# Patient Record
Sex: MALE | Race: WHITE | NOT HISPANIC OR LATINO | Employment: OTHER | URBAN - METROPOLITAN AREA
[De-identification: names, ages, dates, MRNs, and addresses within clinical notes are randomized per-mention and may not be internally consistent; named-entity substitution may affect disease eponyms.]

---

## 2018-05-01 ENCOUNTER — OFFICE VISIT (OUTPATIENT)
Dept: CARDIOLOGY | Facility: CLINIC | Age: 78
End: 2018-05-01

## 2018-05-01 VITALS
BODY MASS INDEX: 30.61 KG/M2 | HEIGHT: 72 IN | DIASTOLIC BLOOD PRESSURE: 82 MMHG | SYSTOLIC BLOOD PRESSURE: 150 MMHG | HEART RATE: 55 BPM | WEIGHT: 226 LBS

## 2018-05-01 DIAGNOSIS — Z91.89 CARDIOVASCULAR RISK FACTOR: ICD-10-CM

## 2018-05-01 DIAGNOSIS — I45.2 RBBB (RIGHT BUNDLE BRANCH BLOCK WITH LEFT ANTERIOR FASCICULAR BLOCK): Primary | ICD-10-CM

## 2018-05-01 DIAGNOSIS — I10 ESSENTIAL HYPERTENSION: ICD-10-CM

## 2018-05-01 DIAGNOSIS — E78.2 MIXED HYPERLIPIDEMIA: ICD-10-CM

## 2018-05-01 PROCEDURE — 99204 OFFICE O/P NEW MOD 45 MIN: CPT | Performed by: INTERNAL MEDICINE

## 2018-05-01 PROCEDURE — 93000 ELECTROCARDIOGRAM COMPLETE: CPT | Performed by: INTERNAL MEDICINE

## 2018-05-01 RX ORDER — AMLODIPINE BESYLATE 10 MG/1
10 TABLET ORAL DAILY
Qty: 30 TABLET | Refills: 11 | Status: SHIPPED | OUTPATIENT
Start: 2018-05-01 | End: 2019-08-04 | Stop reason: SDUPTHER

## 2018-05-01 RX ORDER — FUROSEMIDE 20 MG/1
20 TABLET ORAL DAILY
COMMUNITY
End: 2021-06-25

## 2018-05-01 RX ORDER — LOSARTAN POTASSIUM 100 MG/1
100 TABLET ORAL DAILY
COMMUNITY
End: 2021-06-25

## 2018-05-01 RX ORDER — AMLODIPINE BESYLATE 5 MG/1
5 TABLET ORAL
COMMUNITY
Start: 2017-12-06 | End: 2018-05-01 | Stop reason: SDUPTHER

## 2018-05-01 RX ORDER — CHLORAL HYDRATE 500 MG
1000 CAPSULE ORAL
COMMUNITY

## 2018-05-01 NOTE — PROGRESS NOTES
Date of Office Visit: 18  Encounter Provider: Richie Aragon MD  Place of Service: [facility] CARDIOLOGY  Patient Name: Herber Lewis  :1940  Referral Provider:No ref. provider found      Chief Complaint   Patient presents with   • Abnormal ECG   • Hypertension     History of Present Illness  Mr Lewis is a pleasant 78 yo gentleman with history of hypertension, hyperlipidemia, stage III kidney disease and tuberculosis treated with INH in the distant past.  But he now comes in for cardiovascular evaluation and for elevated blood pressure.  He states that his blood pressures been running 150 systolic at home for quite some time his future wife is concerned about that.  But he denies any chest pain or pressure.  Denies any shortness of breath, orthopnea, or PND.  Denies any palpitations, near-syncope or syncope.  Denies near abrupt loss of vision, paralysis, paresthesia, or dysarthria.  He does not exercise regularly he doesn't really seem to follow diet he still smokes cigarettes.          Past Medical History:   Diagnosis Date   • BPH (benign prostatic hyperplasia)    • Hyperlipidemia    • Hypertension    • Nicotine dependence    • TB (pulmonary tuberculosis)          History reviewed. No pertinent surgical history.      No current outpatient prescriptions on file prior to visit.     No current facility-administered medications on file prior to visit.          Social History     Social History   • Marital status: Single     Spouse name: N/A   • Number of children: N/A   • Years of education: N/A     Occupational History   • Not on file.     Social History Main Topics   • Smoking status: Current Every Day Smoker   • Smokeless tobacco: Not on file   • Alcohol use Yes   • Drug use: Unknown   • Sexual activity: Not on file     Other Topics Concern   • Not on file     Social History Narrative   • No narrative on file       History reviewed. No pertinent family  "history.      Review of Systems   Constitution: Negative for decreased appetite, diaphoresis, fever, weakness, malaise/fatigue, weight gain and weight loss.   HENT: Negative for congestion, hearing loss, nosebleeds and tinnitus.    Eyes: Negative for blurred vision, double vision, vision loss in left eye, vision loss in right eye and visual disturbance.   Cardiovascular:        As noted in HPI   Respiratory:        As noted HPI   Endocrine: Negative for cold intolerance and heat intolerance.   Hematologic/Lymphatic: Negative for bleeding problem. Does not bruise/bleed easily.   Skin: Negative for color change, flushing, itching and rash.   Musculoskeletal: Positive for joint pain. Negative for arthritis, back pain, joint swelling, muscle weakness and myalgias.   Gastrointestinal: Negative for bloating, abdominal pain, constipation, diarrhea, dysphagia, heartburn, hematemesis, hematochezia, melena, nausea and vomiting.   Genitourinary: Negative for bladder incontinence, dysuria, frequency, nocturia and urgency.   Neurological: Negative for dizziness, focal weakness, headaches, light-headedness, loss of balance, numbness, paresthesias and vertigo.   Psychiatric/Behavioral: Negative for depression, memory loss and substance abuse.       Procedures      ECG 12 Lead  Date/Time: 5/1/2018 9:22 AM  Performed by: ONIEL HOOKS  Authorized by: ONIEL HOOKS   Comparison: not compared with previous ECG   Previous ECG: no previous ECG available  Rhythm: sinus rhythm  Rate: normal  Conduction: right bundle branch block and LAFB  QRS axis: left                  Objective:    /82 (BP Location: Left arm)   Pulse 55   Ht 182.9 cm (72\")   Wt 103 kg (226 lb)   BMI 30.65 kg/m²        Physical Exam  Physical Exam   Constitutional: He is oriented to person, place, and time. He appears well-developed and well-nourished. No distress.   HENT:   Head: Normocephalic.   Eyes: Conjunctivae are normal. Pupils are equal, round, " and reactive to light. No scleral icterus.   Neck: Normal carotid pulses, no hepatojugular reflux and no JVD present. Carotid bruit is not present. No tracheal deviation, no edema and no erythema present. No thyromegaly present.   Cardiovascular: Normal rate, regular rhythm, S1 normal, S2 normal, normal heart sounds and intact distal pulses.   No extrasystoles are present. PMI is not displaced.  Exam reveals no gallop, no distant heart sounds and no friction rub.    No murmur heard.  Pulses:       Carotid pulses are 2+ on the right side, and 2+ on the left side.       Radial pulses are 2+ on the right side, and 2+ on the left side.        Femoral pulses are 2+ on the right side, and 2+ on the left side.       Dorsalis pedis pulses are 2+ on the right side, and 2+ on the left side.        Posterior tibial pulses are 2+ on the right side, and 2+ on the left side.   Pulmonary/Chest: Effort normal and breath sounds normal. No respiratory distress. He has no decreased breath sounds. He has no wheezes. He has no rhonchi. He has no rales. He exhibits no tenderness.   Abdominal: Soft. Bowel sounds are normal. He exhibits no distension and no mass. There is no hepatosplenomegaly. There is no tenderness. There is no rebound and no guarding.   Musculoskeletal: He exhibits no edema, tenderness or deformity.   Neurological: He is alert and oriented to person, place, and time.   Skin: Skin is warm and dry. No rash noted. He is not diaphoretic. No cyanosis or erythema. No pallor. Nails show no clubbing.   Psychiatric: He has a normal mood and affect. His speech is normal and behavior is normal. Judgment and thought content normal.           Assessment:   1.  This is a 77-year-old gentleman who now presents for risk assessment.  Unfortunately he's not had a lipid profile in 5 or 6 years he's to return for a fasting lipid profile but I'm sure he is at very high risk and would benefit from statin therapy.  He doesn't exercise  regularly we told him the goal should be 40 minutes moderate activity 4 days a week, he should follow a DASH diet .  And he needs to discontinue cigarette smoking.  2.  Hypertension blood pressure elevated were increasing the amlodipine to 10 mg a day however do need to be sensitive to his renal insufficiency make sure that doesn't worsen.  He's to call us back in one to 2 weeks let us know what his blood pressures are running take his blood pressure twice a day.  3.  Right bundle branch block and left interfascicular block appears be asymptomatic but will check an echocardiogram to rule out any structural heart disease.  4.  History of stage III kidney disease follows with nephrology.  5.  Hyperlipidemia as outlined above.  6.  History of tuberculosis treated with INH in the distant past.         Plan:

## 2018-05-23 ENCOUNTER — TELEPHONE (OUTPATIENT)
Dept: CARDIOLOGY | Facility: CLINIC | Age: 78
End: 2018-05-23

## 2018-05-23 ENCOUNTER — HOSPITAL ENCOUNTER (OUTPATIENT)
Dept: CARDIOLOGY | Facility: HOSPITAL | Age: 78
Discharge: HOME | End: 2018-05-23
Attending: INTERNAL MEDICINE | Admitting: INTERNAL MEDICINE

## 2018-05-23 VITALS
DIASTOLIC BLOOD PRESSURE: 70 MMHG | HEART RATE: 57 BPM | SYSTOLIC BLOOD PRESSURE: 160 MMHG | BODY MASS INDEX: 30.61 KG/M2 | WEIGHT: 226 LBS | HEIGHT: 72 IN

## 2018-05-23 LAB
ASCENDING AORTA: 2.9 CM
BH CV ECHO MEAS - ACS: 2.3 CM
BH CV ECHO MEAS - AO MAX PG (FULL): -0.16 MMHG
BH CV ECHO MEAS - AO MAX PG: 7 MMHG
BH CV ECHO MEAS - AO MEAN PG (FULL): 0.97 MMHG
BH CV ECHO MEAS - AO MEAN PG: 4.3 MMHG
BH CV ECHO MEAS - AO ROOT AREA (BSA CORRECTED): 1.7
BH CV ECHO MEAS - AO ROOT AREA: 11 CM^2
BH CV ECHO MEAS - AO ROOT DIAM: 3.7 CM
BH CV ECHO MEAS - AO V2 MAX: 131.9 CM/SEC
BH CV ECHO MEAS - AO V2 MEAN: 99 CM/SEC
BH CV ECHO MEAS - AO V2 VTI: 33.4 CM
BH CV ECHO MEAS - AVA(I,A): 2.4 CM^2
BH CV ECHO MEAS - AVA(I,D): 2.4 CM^2
BH CV ECHO MEAS - AVA(V,A): 3.1 CM^2
BH CV ECHO MEAS - AVA(V,D): 3.1 CM^2
BH CV ECHO MEAS - BSA(HAYCOCK): 2.3 M^2
BH CV ECHO MEAS - BSA: 2.2 M^2
BH CV ECHO MEAS - BZI_BMI: 30.7 KILOGRAMS/M^2
BH CV ECHO MEAS - BZI_METRIC_HEIGHT: 182.9 CM
BH CV ECHO MEAS - BZI_METRIC_WEIGHT: 102.5 KG
BH CV ECHO MEAS - CONTRAST EF (2CH): 63.5 ML/M^2
BH CV ECHO MEAS - CONTRAST EF 4CH: 56.2 ML/M^2
BH CV ECHO MEAS - EDV(MOD-SP2): 96 ML
BH CV ECHO MEAS - EDV(MOD-SP4): 89 ML
BH CV ECHO MEAS - EDV(TEICH): 129.9 ML
BH CV ECHO MEAS - EF(CUBED): 82.9 %
BH CV ECHO MEAS - EF(MOD-BP): 60 %
BH CV ECHO MEAS - EF(MOD-SP2): 63.5 %
BH CV ECHO MEAS - EF(MOD-SP4): 56.2 %
BH CV ECHO MEAS - EF(TEICH): 75.5 %
BH CV ECHO MEAS - ESV(MOD-SP2): 35 ML
BH CV ECHO MEAS - ESV(MOD-SP4): 39 ML
BH CV ECHO MEAS - ESV(TEICH): 31.9 ML
BH CV ECHO MEAS - FS: 44.5 %
BH CV ECHO MEAS - IVS/LVPW: 0.96
BH CV ECHO MEAS - IVSD: 1.1 CM
BH CV ECHO MEAS - LAT PEAK E' VEL: 12 CM/SEC
BH CV ECHO MEAS - LV DIASTOLIC VOL/BSA (35-75): 39.7 ML/M^2
BH CV ECHO MEAS - LV MASS(C)D: 215.3 GRAMS
BH CV ECHO MEAS - LV MASS(C)DI: 96 GRAMS/M^2
BH CV ECHO MEAS - LV MAX PG: 7.1 MMHG
BH CV ECHO MEAS - LV MEAN PG: 3.3 MMHG
BH CV ECHO MEAS - LV SYSTOLIC VOL/BSA (12-30): 17.4 ML/M^2
BH CV ECHO MEAS - LV V1 MAX: 133.4 CM/SEC
BH CV ECHO MEAS - LV V1 MEAN: 83.7 CM/SEC
BH CV ECHO MEAS - LV V1 VTI: 25.6 CM
BH CV ECHO MEAS - LVIDD: 5.2 CM
BH CV ECHO MEAS - LVIDS: 2.9 CM
BH CV ECHO MEAS - LVLD AP2: 8.3 CM
BH CV ECHO MEAS - LVLD AP4: 8 CM
BH CV ECHO MEAS - LVLS AP2: 6.6 CM
BH CV ECHO MEAS - LVLS AP4: 6.6 CM
BH CV ECHO MEAS - LVOT AREA (M): 3.1 CM^2
BH CV ECHO MEAS - LVOT AREA: 3.1 CM^2
BH CV ECHO MEAS - LVOT DIAM: 2 CM
BH CV ECHO MEAS - LVPWD: 1.1 CM
BH CV ECHO MEAS - MED PEAK E' VEL: 9 CM/SEC
BH CV ECHO MEAS - MR MAX PG: 14.8 MMHG
BH CV ECHO MEAS - MR MAX VEL: 192.4 CM/SEC
BH CV ECHO MEAS - MV A DUR: 0.1 SEC
BH CV ECHO MEAS - MV A MAX VEL: 93.1 CM/SEC
BH CV ECHO MEAS - MV DEC SLOPE: 439.9 CM/SEC^2
BH CV ECHO MEAS - MV DEC TIME: 0.2 SEC
BH CV ECHO MEAS - MV E MAX VEL: 97 CM/SEC
BH CV ECHO MEAS - MV E/A: 1
BH CV ECHO MEAS - MV MAX PG: 4.9 MMHG
BH CV ECHO MEAS - MV MEAN PG: 1.5 MMHG
BH CV ECHO MEAS - MV P1/2T MAX VEL: 96.5 CM/SEC
BH CV ECHO MEAS - MV P1/2T: 64.3 MSEC
BH CV ECHO MEAS - MV V2 MAX: 110.1 CM/SEC
BH CV ECHO MEAS - MV V2 MEAN: 53.5 CM/SEC
BH CV ECHO MEAS - MV V2 VTI: 30.7 CM
BH CV ECHO MEAS - MVA P1/2T LCG: 2.3 CM^2
BH CV ECHO MEAS - MVA(P1/2T): 3.4 CM^2
BH CV ECHO MEAS - MVA(VTI): 2.6 CM^2
BH CV ECHO MEAS - PA ACC TIME: 0.15 SEC
BH CV ECHO MEAS - PA MAX PG (FULL): 2.1 MMHG
BH CV ECHO MEAS - PA MAX PG: 3.9 MMHG
BH CV ECHO MEAS - PA PR(ACCEL): 9.3 MMHG
BH CV ECHO MEAS - PA V2 MAX: 98.5 CM/SEC
BH CV ECHO MEAS - PULM A REVS DUR: 0.09 SEC
BH CV ECHO MEAS - PULM A REVS VEL: 27.6 CM/SEC
BH CV ECHO MEAS - PULM DIAS VEL: 87.6 CM/SEC
BH CV ECHO MEAS - PULM S/D: 0.83
BH CV ECHO MEAS - PULM SYS VEL: 72.8 CM/SEC
BH CV ECHO MEAS - PVA(V,A): 2.8 CM^2
BH CV ECHO MEAS - PVA(V,D): 2.8 CM^2
BH CV ECHO MEAS - QP/QS: 0.77
BH CV ECHO MEAS - RAP SYSTOLE: 15 MMHG
BH CV ECHO MEAS - RV MAX PG: 1.8 MMHG
BH CV ECHO MEAS - RV MEAN PG: 0.98 MMHG
BH CV ECHO MEAS - RV V1 MAX: 67.4 CM/SEC
BH CV ECHO MEAS - RV V1 MEAN: 45.5 CM/SEC
BH CV ECHO MEAS - RV V1 VTI: 14.9 CM
BH CV ECHO MEAS - RVOT AREA: 4.1 CM^2
BH CV ECHO MEAS - RVOT DIAM: 2.3 CM
BH CV ECHO MEAS - RVSP: 28 MMHG
BH CV ECHO MEAS - SI(AO): 163.5 ML/M^2
BH CV ECHO MEAS - SI(CUBED): 52.2 ML/M^2
BH CV ECHO MEAS - SI(LVOT): 35.5 ML/M^2
BH CV ECHO MEAS - SI(MOD-SP2): 27.2 ML/M^2
BH CV ECHO MEAS - SI(MOD-SP4): 22.3 ML/M^2
BH CV ECHO MEAS - SI(TEICH): 43.7 ML/M^2
BH CV ECHO MEAS - SUP REN AO DIAM: 1.8 CM
BH CV ECHO MEAS - SV(AO): 366.8 ML
BH CV ECHO MEAS - SV(CUBED): 117.1 ML
BH CV ECHO MEAS - SV(LVOT): 79.6 ML
BH CV ECHO MEAS - SV(MOD-SP2): 61 ML
BH CV ECHO MEAS - SV(MOD-SP4): 50 ML
BH CV ECHO MEAS - SV(RVOT): 61.4 ML
BH CV ECHO MEAS - SV(TEICH): 98 ML
BH CV ECHO MEAS - TAPSE (>1.6): 2.4 CM2
BH CV ECHO MEAS - TR MAX VEL: 183.1 CM/SEC
BH CV ECHO MEASUREMENTS AVERAGE E/E' RATIO: 9.24
BH CV XLRA - RV BASE: 3.5 CM
BH CV XLRA - TDI S': 15 CM/SEC
LEFT ATRIUM VOLUME INDEX: 27 ML/M2
LV EF 2D ECHO EST: 60 %
SINUS: 3.7 CM
STJ: 2.6 CM

## 2018-05-23 PROCEDURE — 93306 TTE W/DOPPLER COMPLETE: CPT

## 2018-05-23 PROCEDURE — 93306 TTE W/DOPPLER COMPLETE: CPT | Performed by: INTERNAL MEDICINE

## 2018-05-23 NOTE — TELEPHONE ENCOUNTER
Herber Lewis  Male, 77 y.o., 1940  PCP:   Cleveland Dwyer MD  Language:   English  Need Interp:   No  Last Weight:   103 kg (226 lb)  Phone:   M: [phone] H: [phone]  Allergies  Codeine  Health Maintenance:   Due  FYI:   None  Primary Ins.:   [employer] MEDICARE REPLACEMENT  MRN:   [id number]  MyChart:   Inactive  Pharmacy:   [facility] [address] [place] [address] [address] AT [facility] & [address] - [phone]  - [phone] FX [[id number]]  Preferred Lab:   None  Next Appt with Me:   None  Next Appt Date by Dept:   None        PACS Images     Radiology Images   Adult Transthoracic Echo Complete W/ Cont if Necessary Per Protocol   Order: [id number]   Status:  Final result   Visible to patient:  No (Not Released) Dx:  RBBB (right bundle branch block with ...   Details     Reading Physician Reading Date Result Priority   Radha Hill MD 5/23/2018 Routine   Result Text   ·   Left ventricular systolic function is normal. Calculated EF = 60%. Estimated EF was in agreement with the calculated EF. Estimated EF = 60%. Normal left ventricular cavity size noted. All left ventricular wall segments contract normally. Left ventricular wall thickness is consistent with mild concentric hypertrophy. Left ventricular diastolic function is normal.  · The aortic valve is abnormal in structure. There is moderate thickening of the aortic valve. There is moderate thickening of the noncoronary cusp. No aortic valve regurgitation is present. No aortic valve stenosis is present.  · Mild MAC is present. Trace mitral valve regurgitation is present.  · The tricuspid valve is grossly normal. Trace tricuspid valve regurgitation is present. Estimated right ventricular systolic pressure from tricuspid regurgitation is normal (<35 mmHg). Calculated right ventricular systolic pressure from tricuspid regurgitation is 28 mmHg.            All Measurements                                                                                                                                                                                                                                                                                                                                                                                                                                                                                                                                                                                                                                                    [facility]  [address]  [address]  [facility] [address] [phone]      Study Description     A two-dimensional transthoracic echocardiogram with color flow and Doppler was performed. The study is technically adequate for diagnosis.   Echocardiogram Findings     Left Ventricle Left ventricular systolic function is normal. Calculated EF = 60%. Estimated EF was in agreement with the calculated EF. Estimated EF = 60%. Normal left ventricular cavity size noted. All left ventricular wall segments contract normally. Left ventricular wall thickness is consistent with mild concentric hypertrophy. Left ventricular diastolic function is normal.      Right Ventricle Normal right ventricular cavity size, wall thickness, systolic function and septal motion noted.      Left Atrium Normal left atrial volume noted.      Right Atrium Normal right atrial size noted. The inferior vena cava is dilated. Partial IVC inspiratory collapse of less than 50% noted.      Aortic Valve The valve was poorly visualized but appears trileaflet. The aortic valve is abnormal in structure. There is moderate thickening of the aortic valve. There is moderate thickening of the noncoronary cusp. No aortic valve regurgitation is present. No aortic valve stenosis is present.      Mitral Valve The mitral valve is abnormal in structure. Mild MAC is  present. Trace mitral valve regurgitation is present. No significant mitral valve stenosis is present.      Tricuspid Valve Tricuspid valve not well visualized. The tricuspid valve is grossly normal. Trace tricuspid valve regurgitation is present. Estimated right ventricular systolic pressure from tricuspid regurgitation is normal (<35 mmHg). Calculated right ventricular systolic pressure from tricuspid regurgitation is 28 mmHg.      Pulmonic Valve The pulmonic valve was not well visualized. The pulmonic valve is grossly normal in structure. There is no pulmonic valve regurgitation present.      Greater Vessels No dilation of the aortic root is present. No dilation of the proximal aorta is present. The aortic arch was not well visualized. The inferior vena cava is dilated. Partial IVC inspiratory collapse of less than 50% noted.      Pericardium The pericardium is normal. There is no evidence of pericardial effusion.      Wall Scoring     Score Index: 1.000 Percent Normal: 100.0%             The left ventricular wall motion is normal.               PACS Images     Show images for Adult Transthoracic Echo Complete W/ Cont if Necessary Per Protocol         Specimen Collected: 05/23/18 10:08 Last Resulted: 05/23/18 11:40                Status of Other Orders     Order  Lab Status Result Date Provider Status   ECG 12 Lead Final result 5/1/2018 Open   SCANNED EKG Final result 5/1/2018 Open          Encounter Notes      All notes         Routing History     Priority Sent On From To Message Type    5/23/2018 11:45 AM MD Richie Martell MD Results

## 2019-06-04 ENCOUNTER — LAB (OUTPATIENT)
Dept: LAB | Facility: HOSPITAL | Age: 79
End: 2019-06-04

## 2019-06-04 ENCOUNTER — CONSULT (OUTPATIENT)
Dept: ONCOLOGY | Facility: CLINIC | Age: 79
End: 2019-06-04

## 2019-06-04 VITALS
HEIGHT: 71 IN | WEIGHT: 228.7 LBS | HEART RATE: 50 BPM | DIASTOLIC BLOOD PRESSURE: 78 MMHG | OXYGEN SATURATION: 97 % | TEMPERATURE: 97.5 F | SYSTOLIC BLOOD PRESSURE: 168 MMHG | BODY MASS INDEX: 32.02 KG/M2 | RESPIRATION RATE: 16 BRPM

## 2019-06-04 DIAGNOSIS — R97.20 ELEVATED PROSTATE SPECIFIC ANTIGEN (PSA): ICD-10-CM

## 2019-06-04 DIAGNOSIS — D72.820 LYMPHOCYTOSIS: Primary | ICD-10-CM

## 2019-06-04 DIAGNOSIS — C91.10 CLL (CHRONIC LYMPHOCYTIC LEUKEMIA) (HCC): Primary | ICD-10-CM

## 2019-06-04 DIAGNOSIS — C91.10 CLL (CHRONIC LYMPHOCYTIC LEUKEMIA) (HCC): ICD-10-CM

## 2019-06-04 PROBLEM — E21.3 HYPERPARATHYROIDISM (HCC): Status: ACTIVE | Noted: 2017-01-01

## 2019-06-04 LAB
ALBUMIN SERPL-MCNC: 4.9 G/DL (ref 3.5–5.2)
ALBUMIN/GLOB SERPL: 1.9 G/DL (ref 1.1–2.4)
ALP SERPL-CCNC: 130 U/L (ref 38–116)
ALT SERPL W P-5'-P-CCNC: 17 U/L (ref 0–41)
ANION GAP SERPL CALCULATED.3IONS-SCNC: 12.2 MMOL/L
AST SERPL-CCNC: 19 U/L (ref 0–40)
B2 MICROGLOB SERPL-MCNC: 5 MG/L (ref 0.8–2.2)
BILIRUB SERPL-MCNC: 0.3 MG/DL (ref 0.2–1.2)
BUN BLD-MCNC: 24 MG/DL (ref 6–20)
BUN/CREAT SERPL: 14 (ref 7.3–30)
CALCIUM SPEC-SCNC: 9.9 MG/DL (ref 8.5–10.2)
CHLORIDE SERPL-SCNC: 102 MMOL/L (ref 98–107)
CO2 SERPL-SCNC: 26.8 MMOL/L (ref 22–29)
CREAT BLD-MCNC: 1.71 MG/DL (ref 0.7–1.3)
DEPRECATED RDW RBC AUTO: 44.5 FL (ref 37–54)
ERYTHROCYTE [DISTWIDTH] IN BLOOD BY AUTOMATED COUNT: 13.3 % (ref 12.3–15.4)
GFR SERPL CREATININE-BSD FRML MDRD: 39 ML/MIN/1.73
GLOBULIN UR ELPH-MCNC: 2.6 GM/DL (ref 1.8–3.5)
GLUCOSE BLD-MCNC: 116 MG/DL (ref 74–124)
HBV CORE IGM SERPL QL IA: NORMAL
HCT VFR BLD AUTO: 40 % (ref 37.5–51)
HGB BLD-MCNC: 12.9 G/DL (ref 13–17.7)
LDH SERPL-CCNC: 200 U/L (ref 99–259)
LYMPHOCYTES # BLD MANUAL: 173.71 10*3/MM3 (ref 0.7–3.1)
LYMPHOCYTES NFR BLD MANUAL: 95 % (ref 19.6–45.3)
MCH RBC QN AUTO: 29.9 PG (ref 26.6–33)
MCHC RBC AUTO-ENTMCNC: 32.3 G/DL (ref 31.5–35.7)
MCV RBC AUTO: 92.8 FL (ref 79–97)
NEUTROPHILS # BLD AUTO: 9.14 10*3/MM3 (ref 1.7–7)
NEUTROPHILS NFR BLD MANUAL: 5 % (ref 42.7–76)
PLAT MORPH BLD: NORMAL
PLATELET # BLD AUTO: 164 10*3/MM3 (ref 140–450)
PMV BLD AUTO: 10 FL (ref 6–12)
POTASSIUM BLD-SCNC: 5.1 MMOL/L (ref 3.5–4.7)
PROT SERPL-MCNC: 7.5 G/DL (ref 6.3–8)
PSA SERPL-MCNC: 2.19 NG/ML (ref 0–4)
RBC # BLD AUTO: 4.31 10*6/MM3 (ref 4.14–5.8)
RBC MORPH BLD: NORMAL
SODIUM BLD-SCNC: 141 MMOL/L (ref 134–145)
URATE SERPL-MCNC: 8.2 MG/DL (ref 2.8–7.4)
WBC MORPH BLD: NORMAL
WBC NRBC COR # BLD: 182.85 10*3/MM3 (ref 3.4–10.8)

## 2019-06-04 PROCEDURE — 99205 OFFICE O/P NEW HI 60 MIN: CPT | Performed by: INTERNAL MEDICINE

## 2019-06-04 PROCEDURE — 86705 HEP B CORE ANTIBODY IGM: CPT | Performed by: INTERNAL MEDICINE

## 2019-06-04 PROCEDURE — 83615 LACTATE (LD) (LDH) ENZYME: CPT | Performed by: INTERNAL MEDICINE

## 2019-06-04 PROCEDURE — 85025 COMPLETE CBC W/AUTO DIFF WBC: CPT | Performed by: INTERNAL MEDICINE

## 2019-06-04 PROCEDURE — 36416 COLLJ CAPILLARY BLOOD SPEC: CPT | Performed by: INTERNAL MEDICINE

## 2019-06-04 PROCEDURE — 84550 ASSAY OF BLOOD/URIC ACID: CPT | Performed by: INTERNAL MEDICINE

## 2019-06-04 PROCEDURE — 36415 COLL VENOUS BLD VENIPUNCTURE: CPT | Performed by: INTERNAL MEDICINE

## 2019-06-04 PROCEDURE — 85007 BL SMEAR W/DIFF WBC COUNT: CPT | Performed by: INTERNAL MEDICINE

## 2019-06-04 PROCEDURE — 82232 ASSAY OF BETA-2 PROTEIN: CPT | Performed by: INTERNAL MEDICINE

## 2019-06-04 PROCEDURE — 84153 ASSAY OF PSA TOTAL: CPT | Performed by: INTERNAL MEDICINE

## 2019-06-04 PROCEDURE — 80053 COMPREHEN METABOLIC PANEL: CPT | Performed by: INTERNAL MEDICINE

## 2019-06-04 NOTE — PROGRESS NOTES
Subjective     REASON FOR CONSULTATION:  LEUKOCYTOSIS, LYMPHOCYTOSIS, LIKELY CLL  Provide an opinion on any further workup or treatment                             REQUESTING PHYSICIAN:  DR MIGUEL VARGHESE MD, DR FIDE GUTIERRES MD    RECORDS OBTAINED:  Records of the patients history including those obtained from the referring provider were reviewed and summarized in detail.          History of Present Illness A 77-year-old white male who has been extremely healthy most of his life who usually does not see any physician for anything in particular who has been sent to me and discussed with Fide Gutierres MD a few days ago in the background of leukocytosis. When he was seen by Fide Gutierres MD last week his white count was 152,000 and the differential from this mostly was lymphocytes. At that point I thought that the patient could have chronic lymphocytic leukemia. He is here today for assessment of this. Physically the patient has no fever, no chills, no nocturnal sweats, he has no fatigue. He has no obvious adenopathies on any level besides minimal alterations in his neck on the left side. His appetite and weight remain stable, his bowel function and urination are normal. He has frequency to urinate. He has no hematuria. The patient denies any pain on any level, no skeletal pain, he denies any infections and he has no obvious autoimmunity of any nature. He has no rashes in the skin. He is strong like an ox and he is capable of doing anything that he pleases with no limitations. He plays golf, he does sailing and he does a lot of other housework. The patient has no other health issues at this time.         Past Medical History:   Diagnosis Date   • Abnormal ECG    • BPH (benign prostatic hyperplasia)    • Chronic kidney disease    • Hyperlipidemia    • Hyperparathyroidism (CMS/HCC) 2017    parathyroidectomy   • Hypertension    • Nicotine dependence    • TB (pulmonary tuberculosis)         Past Surgical  History:   Procedure Laterality Date   • PARATHYROID GLAND SURGERY     • TRIGGER FINGER RELEASE      Left 5th finger        Current Outpatient Medications on File Prior to Visit   Medication Sig Dispense Refill   • amLODIPine (NORVASC) 10 MG tablet Take 1 tablet by mouth Daily. 30 tablet 11   • Ascorbic Acid (VITAMIN C PO) Take  by mouth.     • aspirin 81 MG tablet Take 81 mg by mouth.     • furosemide (LASIX) 20 MG tablet Take 20 mg by mouth Daily.     • HYDRALAZINE HCL PO Take 25 mg by mouth Daily.     • losartan (COZAAR) 100 MG tablet Take 100 mg by mouth Daily.     • METOPROLOL TARTRATE PO Take 25 mg by mouth Daily.     • Multiple Vitamins-Minerals (MULTIVITAL-M) tablet Take  by mouth Daily.     • Omega-3 Fatty Acids (FISH OIL) 1000 MG capsule capsule Take 1,000 mg by mouth Daily With Breakfast.     • VITAMIN E PO Take  by mouth.       No current facility-administered medications on file prior to visit.         ALLERGIES:    Allergies   Allergen Reactions   • Codeine Unknown (See Comments)     UNKNOWN          Social History     Socioeconomic History   • Marital status:      Spouse name: Not on file   • Number of children: 2   • Years of education: Not on file   • Highest education level: High school graduate   Occupational History     Employer: RETIRED   Social Needs   • Financial resource strain: Not hard at all   • Food insecurity:     Worry: Never true     Inability: Never true   • Transportation needs:     Medical: No     Non-medical: Not on file   Tobacco Use   • Smoking status: Current Every Day Smoker     Packs/day: 0.50     Years: 60.00     Pack years: 30.00   • Smokeless tobacco: Never Used   Substance and Sexual Activity   • Alcohol use: Yes     Alcohol/week: 1.2 oz     Types: 2 Cans of beer per week     Frequency: 2-4 times a month     Drinks per session: 1 or 2     Binge frequency: Never     Comment: 2-3 beers 1-2 days a week   • Drug use: No     Comment: 2 cups coffee daily   • Sexual  activity: Defer     Partners: Female        Family History   Problem Relation Age of Onset   • Heart disease Mother    • Stroke Mother    • Diabetes Mother    • Hypertension Sister    • Cancer Father         Review of Systems   General: no fever, no chills, no fatigue,no weight changes, no lack of appetite.  Eyes: no epiphora, xerophthalmia,conjunctivitis, pain, glaucoma, blurred vision, blindness, secretion, photophobia, proptosis, diplopia.  Ears: no otorrhea, tinnitus, otorrhagia, deafness, pain, vertigo.  Nose: no rhinorrhea, no epistaxis, no alteration in perception of odors, no sinuses pressure.  Mouth: no alteration in gums or teeth,  No ulcers, no difficulty with mastication or deglut ion, no odynophagia.  Neck: no masses or pain, no thyroid alterations, no pain in muscles or arteries, no carotid odynia, no crepitation.  Respiratory: no cough, no sputum production,no dyspnea,no trepopnea, no pleuritic pain,no hemoptysis.  Heart: no syncope, no irregularity, no palpitations, no angina,no orthopnea,no paroxysmal nocturnal dyspnea.  Vascular Venous: no tenderness,no edema,no palpable cords,no postphlebitic syndrome, no skin changes no ulcerations.  Vascular Arterial: no distal ischemia, noclaudication, no gangrene, no neuropathic ischemic pain, no skin ulcers, no paleness no cyanosis.  GI: no dysphagia, no odynophagia, no regurgitation, no heartburn,no indigestion,no nausea,no vomiting,no hematemesis ,no melena,no jaundice,no distention, no obstipation,no enterorrhagia,no proctalgia,no anal  lesions, no changes in bowel habits.  :  frequency, no hesitancy, no hematuria, no discharge,no  pain.  Musculoskeletal: no muscle or tendon pain or inflammation,no  joint pain, no edema, no functional limitation,no fasciculations, no mass.  Neurologic: no headache, no seizures, noalterations on Craneal nerves, no motor deficit, no sensory deficit, normal coordination, no alteration in memory,normal orientation,  "calculation,normal writting, verbal and written language.  Skin: no rashes,no pruritus no localized lesions.  Psychiatric: no anxiety, no depression,no agitation, no delusions, proper insight.      Objective     Vitals:    06/04/19 0807   BP: 168/78   Pulse: 50   Resp: 16   Temp: 97.5 °F (36.4 °C)   TempSrc: Oral   SpO2: 97%   Weight: 104 kg (228 lb 11.2 oz)   Height: 179.5 cm (70.67\")  Comment: New Ht No Shoes   PainSc: 0-No pain     Current Status 6/4/2019   ECOG score 0       Physical Exam    GENERAL:  Well-developed, well-nourished  Patient  in no acute distress.   SKIN:  Warm, dry ,NO rashes,NO purpura ,NO petechiae.  HEENT:  Pupils were equal and reactive to light and accomodation, conjunctivas non injected, no pterigion, normal extraocular movements, normal visual acuity.   Mouth mucosa was moist, no exudates in oropharynx, normal gum line, normal roof of the mouth and pillars, normal papillations of the tongue.  NECK:  Supple with good range of motion; no thyromegaly or masses, no JVD or bruits, no cervical adenopathies.No carotid arteries pain, no carotid abnormal pulsation , NO arterial dance.  LYMPHATICS:  Minimal 1 cm left cervical nodes soft mobile none tender cervical, NO supraclavicular, NO axillary,NO epitrochlear , NO inguinal adenopathy.  CHEST:  Normal excursion of both ephraim thoraces, normal voice fremitus, no subcutaneous emphysema, normal axillas, no rashes or acanthosis nigricans. Lungs clear to percussion and auscultation, normal breath sounds bilaterally, no wheezing,NO crackles NO ronchi, NO stridor, NO rubs.  CARDIAC AND VASCULAR:  normal rate and regular rhythm, without murmurs,NO rubs NO S3 NO S4 right or left . Normal femoral, popliteal, pedis, brachial and carotid pulses.  ABDOMEN:  Soft, nontender with no organomegaly or masses, no ascites, no collateral circulation,no distention,no [place] sign, no abdominal pain, no inguinal hernias,no umbilical hernia, no abdominal bruits. Normal " bowel sounds.  GENITAL: Not  Performed.  EXTREMITIES  AND SPINE:  No clubbing, cyanosis or edema, no deformities or pain .No kyphosis, scoliosis, deformities or pain in spine, ribs or pelvic bone.  NEUROLOGICAL:  Patient was awake, alert, oriented to time, person and place.        RECENT LABS:  Hematology WBC   Date Value Ref Range Status   06/04/2019 182.85 (C) 3.40 - 10.80 10*3/mm3 Final     RBC   Date Value Ref Range Status   06/04/2019 4.31 4.14 - 5.80 10*6/mm3 Final     Hemoglobin   Date Value Ref Range Status   06/04/2019 12.9 (L) 13.0 - 17.7 g/dL Final     Hematocrit   Date Value Ref Range Status   06/04/2019 40.0 37.5 - 51.0 % Final     Platelets   Date Value Ref Range Status   06/04/2019 164 140 - 450 10*3/mm3 Final          Assessment/Plan  In summary I have an extremely healthy 78-year-old white male who looks like 57 who is extremely active with no limitations in lifestyle, sailing. Actually he has been sailing from [place] to [facility] and back to [facility] to [place] and so forth who presented to Fide Parekh MD, Nephrologist last week with a white count of 152,000. Today his white count is 180,000. I have reviewed his peripheral blood under the microscope and most of his white cells are mature lymphocytes with typical smudge features. The white cell morphology shows no evidence of prolymphocytes and there are no blasts or plasma cells in circulation. The red cell morphology is normal, the platelet count and morphology is normal.     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 The fact that the white count has increased by 30,000 in question of a week allows me to know that this patient is going to require therapy. He has a trip scheduled to go to [place] in the near part of June and we need to hartley to make a diagnosis and to decide what therapy he is going to require. Tentatively my opinion will be that the patient will be a candidate to receive Imbruvica or Leukeran at  least through the trip to [place] and once that he returns back depending on the blood counts the patient will be amenable to have a different intervention. We further need to subclassify his CLL depending on the time and characteristics. He needs to have staging of the disease with a CT scan of the chest, abdomen and pelvis. He needs to have peripheral blood flow cytometry and FISH analysis on this. He needs to have quantitative immunoglobulins and serum protein electrophoresis, LDH and beta-2 microglobulin.     I discussed with him the fact that most of the time we do not know why this disease happens but in the way how I think about him and especially with the news of increasing count by 30,000 in a week I am sure that the patient is going to require therapy very soon. I am ready for initiation of therapy on him as early as next week. We made him an appointment to go ahead and proceed with the testing, went back to the lab to have to have his blood draw and he will proceed with his radiological assessment. None of the scans will have IV contrast. We will do this to protect his kidney function.    I reviewed urinalysis from Fide Parekh MD's office where the patient had no proteinuria. No urine collection will be done under the present circumstances unless that we find a monoclonal protein in blood.     I discussed all of these facts with the patient and his wife in detail and they were ready to proceed.

## 2019-06-05 LAB
ALBUMIN SERPL-MCNC: 4.1 G/DL (ref 2.9–4.4)
ALBUMIN/GLOB SERPL: 1.4 {RATIO} (ref 0.7–1.7)
ALPHA1 GLOB FLD ELPH-MCNC: 0.3 G/DL (ref 0–0.4)
ALPHA2 GLOB SERPL ELPH-MCNC: 0.9 G/DL (ref 0.4–1)
B-GLOBULIN SERPL ELPH-MCNC: 1.1 G/DL (ref 0.7–1.3)
GAMMA GLOB SERPL ELPH-MCNC: 0.8 G/DL (ref 0.4–1.8)
GLOBULIN SER CALC-MCNC: 3 G/DL (ref 2.2–3.9)
IGA SERPL-MCNC: 91 MG/DL (ref 61–437)
IGG SERPL-MCNC: 484 MG/DL (ref 700–1600)
IGM SERPL-MCNC: 51 MG/DL (ref 15–143)
INTERPRETATION SERPL IEP-IMP: ABNORMAL
KAPPA LC SERPL-MCNC: 31.7 MG/L (ref 3.3–19.4)
KAPPA LC/LAMBDA SER: 1.71 {RATIO} (ref 0.26–1.65)
LAMBDA LC FREE SERPL-MCNC: 18.5 MG/L (ref 5.7–26.3)
Lab: ABNORMAL
M-SPIKE: ABNORMAL G/DL
PROT SERPL-MCNC: 7.1 G/DL (ref 6–8.5)
REF LAB TEST METHOD: NORMAL
REF LAB TEST METHOD: NORMAL

## 2019-06-06 ENCOUNTER — HOSPITAL ENCOUNTER (OUTPATIENT)
Dept: PET IMAGING | Facility: HOSPITAL | Age: 79
Discharge: HOME | End: 2019-06-06
Admitting: INTERNAL MEDICINE

## 2019-06-06 DIAGNOSIS — C91.10 CLL (CHRONIC LYMPHOCYTIC LEUKEMIA) (HCC): ICD-10-CM

## 2019-06-06 PROCEDURE — 71250 CT THORAX DX C-: CPT

## 2019-06-06 PROCEDURE — 0 DIATRIZOATE MEGLUMINE & SODIUM PER 1 ML: Performed by: INTERNAL MEDICINE

## 2019-06-06 PROCEDURE — 74176 CT ABD & PELVIS W/O CONTRAST: CPT

## 2019-06-06 RX ADMIN — DIATRIZOATE MEGLUMINE AND DIATRIZOATE SODIUM 30 ML: 660; 100 LIQUID ORAL; RECTAL at 12:49

## 2019-06-07 LAB
QUANTIFERON CRITERIA: NORMAL
QUANTIFERON INCUBATION: NORMAL
QUANTIFERON MITOGEN VALUE: >10 IU/ML
QUANTIFERON NIL VALUE: 0.02 IU/ML
QUANTIFERON TB1 AG VALUE: 0.02 IU/ML
QUANTIFERON TB2 AG VALUE: 0.02 IU/ML
QUANTIFERON-TB GOLD PLUS: NEGATIVE

## 2019-06-11 ENCOUNTER — LAB (OUTPATIENT)
Dept: LAB | Facility: HOSPITAL | Age: 79
End: 2019-06-11

## 2019-06-11 ENCOUNTER — OFFICE VISIT (OUTPATIENT)
Dept: ONCOLOGY | Facility: CLINIC | Age: 79
End: 2019-06-11

## 2019-06-11 VITALS
HEIGHT: 71 IN | WEIGHT: 229.2 LBS | OXYGEN SATURATION: 97 % | SYSTOLIC BLOOD PRESSURE: 172 MMHG | RESPIRATION RATE: 14 BRPM | HEART RATE: 61 BPM | TEMPERATURE: 97.7 F | BODY MASS INDEX: 32.09 KG/M2 | DIASTOLIC BLOOD PRESSURE: 66 MMHG

## 2019-06-11 DIAGNOSIS — C91.10 CLL (CHRONIC LYMPHOCYTIC LEUKEMIA) (HCC): ICD-10-CM

## 2019-06-11 DIAGNOSIS — N40.1 ENLARGED PROSTATE WITH URINARY OBSTRUCTION: ICD-10-CM

## 2019-06-11 DIAGNOSIS — C82.00 FOLLICULAR LYMPHOMA GRADE I, UNSPECIFIED BODY REGION (HCC): Primary | ICD-10-CM

## 2019-06-11 DIAGNOSIS — N13.8 ENLARGED PROSTATE WITH URINARY OBSTRUCTION: ICD-10-CM

## 2019-06-11 DIAGNOSIS — C85.90 NON-HODGKIN'S LYMPHOMA, UNSPECIFIED BODY REGION, UNSPECIFIED NON-HODGKIN LYMPHOMA TYPE (HCC): Primary | ICD-10-CM

## 2019-06-11 DIAGNOSIS — I71.40 AAA (ABDOMINAL AORTIC ANEURYSM) WITHOUT RUPTURE (HCC): ICD-10-CM

## 2019-06-11 DIAGNOSIS — C83.08 SMALL B-CELL LYMPHOMA OF LYMPH NODES OF MULTIPLE REGIONS (HCC): ICD-10-CM

## 2019-06-11 DIAGNOSIS — R97.20 ELEVATED PROSTATE SPECIFIC ANTIGEN (PSA): ICD-10-CM

## 2019-06-11 DIAGNOSIS — C82.07 FOLLICULAR LYMPHOMA GRADE I OF SPLEEN (HCC): ICD-10-CM

## 2019-06-11 PROBLEM — E21.3 HYPERPARATHYROIDISM (HCC): Status: RESOLVED | Noted: 2017-01-01 | Resolved: 2019-06-11

## 2019-06-11 PROBLEM — C85.18 B-CELL LYMPHOMA OF LYMPH NODES OF MULTIPLE REGIONS: Status: ACTIVE | Noted: 2019-06-11

## 2019-06-11 LAB
ALBUMIN SERPL-MCNC: 4.5 G/DL (ref 3.5–5.2)
ALBUMIN/GLOB SERPL: 1.9 G/DL (ref 1.1–2.4)
ALP SERPL-CCNC: 117 U/L (ref 38–116)
ALT SERPL W P-5'-P-CCNC: 17 U/L (ref 0–41)
ANION GAP SERPL CALCULATED.3IONS-SCNC: 13.3 MMOL/L
AST SERPL-CCNC: 19 U/L (ref 0–40)
BASOPHILS # BLD AUTO: 0.13 10*3/MM3 (ref 0–0.2)
BASOPHILS NFR BLD AUTO: 0.1 % (ref 0–1.5)
BILIRUB SERPL-MCNC: 0.2 MG/DL (ref 0.2–1.2)
BUN BLD-MCNC: 25 MG/DL (ref 6–20)
BUN/CREAT SERPL: 14.5 (ref 7.3–30)
CALCIUM SPEC-SCNC: 9.8 MG/DL (ref 8.5–10.2)
CHLORIDE SERPL-SCNC: 101 MMOL/L (ref 98–107)
CO2 SERPL-SCNC: 25.7 MMOL/L (ref 22–29)
CREAT BLD-MCNC: 1.72 MG/DL (ref 0.7–1.3)
DEPRECATED RDW RBC AUTO: 46.2 FL (ref 37–54)
EOSINOPHIL # BLD AUTO: 0.34 10*3/MM3 (ref 0–0.4)
EOSINOPHIL NFR BLD AUTO: 0.2 % (ref 0.3–6.2)
ERYTHROCYTE [DISTWIDTH] IN BLOOD BY AUTOMATED COUNT: 13.4 % (ref 12.3–15.4)
GFR SERPL CREATININE-BSD FRML MDRD: 39 ML/MIN/1.73
GLOBULIN UR ELPH-MCNC: 2.4 GM/DL (ref 1.8–3.5)
GLUCOSE BLD-MCNC: 185 MG/DL (ref 74–124)
HCT VFR BLD AUTO: 38.8 % (ref 37.5–51)
HGB BLD-MCNC: 12.3 G/DL (ref 13–17.7)
IMM GRANULOCYTES # BLD AUTO: 0.33 10*3/MM3 (ref 0–0.05)
IMM GRANULOCYTES NFR BLD AUTO: 0.2 % (ref 0–0.5)
LYMPHOCYTES # BLD AUTO: 146.06 10*3/MM3 (ref 0.7–3.1)
LYMPHOCYTES NFR BLD AUTO: 91.9 % (ref 19.6–45.3)
MCH RBC QN AUTO: 30.1 PG (ref 26.6–33)
MCHC RBC AUTO-ENTMCNC: 31.7 G/DL (ref 31.5–35.7)
MCV RBC AUTO: 95.1 FL (ref 79–97)
MONOCYTES # BLD AUTO: 7.15 10*3/MM3 (ref 0.1–0.9)
MONOCYTES NFR BLD AUTO: 4.5 % (ref 5–12)
NEUTROPHILS # BLD AUTO: 4.87 10*3/MM3 (ref 1.7–7)
NEUTROPHILS NFR BLD AUTO: 3.1 % (ref 42.7–76)
NRBC BLD AUTO-RTO: 0 /100 WBC (ref 0–0.2)
PLATELET # BLD AUTO: 167 10*3/MM3 (ref 140–450)
PMV BLD AUTO: 10.1 FL (ref 6–12)
POTASSIUM BLD-SCNC: 4.5 MMOL/L (ref 3.5–4.7)
PROT SERPL-MCNC: 6.9 G/DL (ref 6.3–8)
RBC # BLD AUTO: 4.08 10*6/MM3 (ref 4.14–5.8)
SODIUM BLD-SCNC: 140 MMOL/L (ref 134–145)
WBC NRBC COR # BLD: 158.88 10*3/MM3 (ref 3.4–10.8)

## 2019-06-11 PROCEDURE — 36415 COLL VENOUS BLD VENIPUNCTURE: CPT | Performed by: INTERNAL MEDICINE

## 2019-06-11 PROCEDURE — 85025 COMPLETE CBC W/AUTO DIFF WBC: CPT | Performed by: INTERNAL MEDICINE

## 2019-06-11 PROCEDURE — 38221 DX BONE MARROW BIOPSIES: CPT | Performed by: INTERNAL MEDICINE

## 2019-06-11 PROCEDURE — 99215 OFFICE O/P EST HI 40 MIN: CPT | Performed by: INTERNAL MEDICINE

## 2019-06-11 PROCEDURE — 80053 COMPREHEN METABOLIC PANEL: CPT | Performed by: INTERNAL MEDICINE

## 2019-06-11 NOTE — PROGRESS NOTES
Subjective     REASON FOR FOLLOW UP:  LEUKOCYTOSIS, LYMPHOCYTOSIS,  FLOW CYTOMETRY DOCUMENTS MARGINAL ZONE NON HODGKIN'S LYMPHOMA        History of Present Illness A 77-year-old white male who has been extremely healthy most of his life who usually does not see any physician for anything in particular who has been sent to me and discussed with Fide Parekh MD a few days ago in the background of leukocytosis. When he was seen by Fide Parekh MD last week his white count was 152,000 and the differential from this mostly was lymphocytes. At that point I thought that the patient could have chronic lymphocytic leukemia. He is here today for assessment of this. Physically the patient has no fever, no chills, no nocturnal sweats, he has no fatigue. He has no obvious adenopathies on any level besides minimal alterations in his neck on the left side. His appetite and weight remain stable, his bowel function and urination are normal. He has frequency to urinate. He has no hematuria. The patient denies any pain on any level, no skeletal pain, he denies any infections and he has no obvious autoimmunity of any nature. He has no rashes in the skin. He is strong like an ox and he is capable of doing anything that he pleases with no limitations. He plays golf, he does sailing and he does a lot of other housework. The patient has no other health issues at this time.     The patient returned to the office on 06/11/2019 after having a CT scan that documented minimal retroperitoneal adenopathy if any and enlarged spleen and no obvious mediastinal adenopathy. He had no tumoral lesions at any other level. 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 Given these findings obviously under these principles the patient will require bone marrow test, PET scan. Also we documented that the patient had an abdominal aortic aneurysm that will require evaluation by  Vascular Surgery and he has a huge prostate gland with a normal PSA that eventually will require assessment by Urology.        Past Medical History:   Diagnosis Date   • Abnormal ECG    • BPH (benign prostatic hyperplasia)    • Chronic kidney disease    • Hyperlipidemia    • Hyperparathyroidism (CMS/HCC) 2017    parathyroidectomy   • Hypertension    • Nicotine dependence    • TB (pulmonary tuberculosis)         Past Surgical History:   Procedure Laterality Date   • PARATHYROID GLAND SURGERY     • TRIGGER FINGER RELEASE      Left 5th finger        Current Outpatient Medications on File Prior to Visit   Medication Sig Dispense Refill   • amLODIPine (NORVASC) 10 MG tablet Take 1 tablet by mouth Daily. 30 tablet 11   • Ascorbic Acid (VITAMIN C PO) Take  by mouth.     • aspirin 81 MG tablet Take 81 mg by mouth.     • furosemide (LASIX) 20 MG tablet Take 20 mg by mouth Daily.     • HYDRALAZINE HCL PO Take 25 mg by mouth Daily.     • losartan (COZAAR) 100 MG tablet Take 100 mg by mouth Daily.     • METOPROLOL TARTRATE PO Take 25 mg by mouth Daily.     • Multiple Vitamins-Minerals (MULTIVITAL-M) tablet Take  by mouth Daily.     • Omega-3 Fatty Acids (FISH OIL) 1000 MG capsule capsule Take 1,000 mg by mouth Daily With Breakfast.     • VITAMIN E PO Take  by mouth.       No current facility-administered medications on file prior to visit.         ALLERGIES:    Allergies   Allergen Reactions   • Codeine Unknown (See Comments)     UNKNOWN          Social History     Socioeconomic History   • Marital status:      Spouse name: Not on file   • Number of children: 2   • Years of education: Not on file   • Highest education level: High school graduate   Occupational History     Employer: RETIRED   Social Needs   • Financial resource strain: Not hard at all   • Food insecurity:     Worry: Never true     Inability: Never true   • Transportation needs:     Medical: No     Non-medical: Not on file   Tobacco Use   • Smoking status:  Current Every Day Smoker     Packs/day: 0.50     Years: 60.00     Pack years: 30.00   • Smokeless tobacco: Never Used   Substance and Sexual Activity   • Alcohol use: Yes     Alcohol/week: 1.2 oz     Types: 2 Cans of beer per week     Frequency: 2-4 times a month     Drinks per session: 1 or 2     Binge frequency: Never     Comment: 2-3 beers 1-2 days a week   • Drug use: No     Comment: 2 cups coffee daily   • Sexual activity: Defer     Partners: Female        Family History   Problem Relation Age of Onset   • Heart disease Mother    • Stroke Mother    • Diabetes Mother    • Hypertension Sister    • Cancer Father         Review of Systems         General: no fever, no chills, no fatigue,no weight changes, no lack of appetite.  Eyes: no epiphora, xerophthalmia,conjunctivitis, pain, glaucoma, blurred vision, blindness, secretion, photophobia, proptosis, diplopia.  Ears: no otorrhea, tinnitus, otorrhagia, deafness, pain, vertigo.  Nose: no rhinorrhea, no epistaxis, no alteration in perception of odors, no sinuses pressure.  Mouth: no alteration in gums or teeth,  No ulcers, no difficulty with mastication or deglut ion, no odynophagia.  Neck: no masses or pain, no thyroid alterations, no pain in muscles or arteries, no carotid odynia, no crepitation.  Respiratory: no cough, no sputum production,no dyspnea,no trepopnea, no pleuritic pain,no hemoptysis.  Heart: no syncope, no irregularity, no palpitations, no angina,no orthopnea,no paroxysmal nocturnal dyspnea.  Vascular Venous: no tenderness,no edema,no palpable cords,no postphlebitic syndrome, no skin changes no ulcerations.  Vascular Arterial: no distal ischemia, noclaudication, no gangrene, no neuropathic ischemic pain, no skin ulcers, no paleness no cyanosis.  GI: no dysphagia, no odynophagia, no regurgitation, no heartburn,no indigestion,no nausea,no vomiting,no hematemesis ,no melena,no jaundice,no distention, no obstipation,no enterorrhagia,no proctalgia,no anal   "lesions, no changes in bowel habits.  : no frequency, no hesitancy, no hematuria, no discharge,no  pain.  Musculoskeletal: no muscle or tendon pain or inflammation,no  joint pain, no edema, no functional limitation,no fasciculations, no mass.  Neurologic: no headache, no seizures, noalterations on Craneal nerves, no motor deficit, no sensory deficit, normal coordination, no alteration in memory,normal orientation, calculation,normal writting, verbal and written language.  Skin: no rashes,no pruritus no localized lesions.  Psychiatric: no anxiety, no depression,no agitation, no delusions, proper insight.        Objective     Vitals:    06/11/19 1534   BP: 172/66   Pulse: 61   Resp: 14   Temp: 97.7 °F (36.5 °C)   TempSrc: Oral   SpO2: 97%   Weight: 104 kg (229 lb 3.2 oz)   Height: 179.5 cm (70.67\")   PainSc: 0-No pain     Current Status 6/11/2019   ECOG score 0       Physical Exam    GENERAL:  Well-developed, well-nourished  Patient  in no acute distress.   SKIN:  Warm, dry ,NO rashes,NO purpura ,NO petechiae.  HEENT:  Pupils were equal and reactive to light and accomodation, conjunctivas non injected, no pterigion, normal extraocular movements, normal visual acuity.   Mouth mucosa was moist, no exudates in oropharynx, normal gum line, normal roof of the mouth and pillars, normal papillations of the tongue.  NECK:  Supple with good range of motion; no thyromegaly or masses, no JVD or bruits, no cervical adenopathies.No carotid arteries pain, no carotid abnormal pulsation , NO arterial dance.  LYMPHATICS:  No cervical, NO supraclavicular, NO axillary,NO epitrochlear , NO inguinal adenopathy.  CHEST:  Normal excursion of both ephraim thoraces, normal voice fremitus, no subcutaneous emphysema, normal axillas, no rashes or acanthosis nigricans. Lungs clear to percussion and auscultation, normal breath sounds bilaterally, no wheezing,NO crackles NO ronchi, NO stridor, NO rubs.  CARDIAC AND VASCULAR:  normal rate and regular " rhythm, without murmurs,NO rubs NO S3 NO S4 right or left . Normal femoral, popliteal, pedis, brachial and carotid pulses.  ABDOMEN:  Soft, nontender with no organomegaly or masses, no ascites, no collateral circulation,no distention,no [place] sign, no abdominal pain, no inguinal hernias,no umbilical hernia, no abdominal bruits. Normal bowel sounds.  GENITAL: Not  Performed.  EXTREMITIES  AND SPINE:  No clubbing, cyanosis or edema, no deformities or pain .No kyphosis, scoliosis, deformities or pain in spine, ribs or pelvic bone.  NEUROLOGICAL:  Patient was awake, alert, oriented to time, person and place.              RECENT LABS:  Hematology WBC   Date Value Ref Range Status   06/11/2019 158.88 (C) 3.40 - 10.80 10*3/mm3 Final     RBC   Date Value Ref Range Status   06/11/2019 4.08 (L) 4.14 - 5.80 10*6/mm3 Final     Hemoglobin   Date Value Ref Range Status   06/11/2019 12.3 (L) 13.0 - 17.7 g/dL Final     Hematocrit   Date Value Ref Range Status   06/11/2019 38.8 37.5 - 51.0 % Final     Platelets   Date Value Ref Range Status   06/11/2019 167 140 - 450 10*3/mm3 Final      Flow Cytometry, Blood [[id number]] (Order [id number])   Order   Date: 6/4/2019 Department: [facility] ONCOLOGY CBC LAB Released By: Laverne Raygoza Authorizing: Vic Herrera MD   Reprint Order Requisition     FLOW CYTOMETRY, BLOOD (Order #[id number]) on 6/4/19       Flow Cytometry, Blood   Order: [id number]   Status:  Final result   Visible to patient:  No (Not Released)   Dx:  CLL (chronic lymphocytic leukemia) (C...   Component 7d ago   Reference Lab Report Flow Cytometry,Blood    Resulting Agency [facility]         Specimen Collected: 06/04/19 09:16 Last Resulted: 06/05/19 15:20        Lab Flowsheet      Order Details      View Encounter      Lab and Collection Details      Routing      Result History            Scans on Order [id number]     Scan on 6/5/2019  3:19 PM by Ely Sawyer L: Flow Cytometry,BloodScan on 6/5/2019  3:19 PM by Digna  Ely CURTIS: Flow Cytometry,Blood         6/5/2019  3:20 PM     Component   Reference Lab Report   Flow Cytometry,Blood    Scans on Order [id number]     Scan on 6/5/2019  3:19 PM by Ely Sawyer: Flow Cytometry,BloodScan on 6/5/2019  3:19 PM by Ely Sawyer: Flow Cytometry,Blood   Lab and Collection     Flow Cytometry, Blood (Order: [id number]) - 6/4/2019   Routing History     Priority Sent On From To Message Type    6/4/2019  8:09 AM Lab, Background User Vic Herrera MD Results   Other Results from 6/4/2019      Beta 2 Microglobulin, Serum  Final result 6/4/2019    CLL profile, fish  Final result 6/4/2019    Hepatitis B Core Antibody, IgM  Final result 6/4/2019    QuantiFERON TB Gold  Final result 6/4/2019    PSA Diagnostic  Final result 6/4/2019    CBC Auto Differential  Final result 6/4/2019    Manual Differential  Final result 6/4/2019     CT CHEST, ABDOMEN, AND PELVIS WITHOUT IV CONTRAST     HISTORY: CLL     TECHNIQUE: Radiation dose reduction techniques were utilized, including  automated exposure control and exposure modulation based on body size.   3 mm images were obtained through the chest, abdomen, and pelvis without  IV contrast.      COMPARISON: None     FINDINGS: Evaluation is suboptimal without intravenous contrast,  particularly the solid organs, vasculature and hilar nodes     Chest:      Mildly prominent mediastinal and hilar lymph nodes are present and  measure up to 1 cm in short axis dimension.     Focal noncalcified pleural plaque is present along the anterior aspect  of the right upper lobe and measures up to 6 cm in length and 0.7 cm in  width.     The heart is normal in size. Moderate coronary artery calcification is  present. There is moderate atherosclerotic calcification of the thoracic  aorta.     Sub-6 mm noncalcified pulmonary nodule within the right upper and lower  lobes. There is a 0.6 cm nodule within the right lower lobe. There is no  pulmonary consolidation or pleural  effusion or pneumothorax.     Abdomen and pelvis:  There are no findings of small bowel obstruction.     The liver, gallbladder, pancreas, adrenal glands and kidneys have an  unremarkable noncontrast CT appearance. There is no hydronephrosis.     The spleen is mildly enlarged, measuring 13.8 cm.     Enlarged yusra hepatic lymph node measures 1.2 cm in short axis  dimension.     Severe atherosclerotic calcification of the abdominal aorta is present.  There is focal aneurysmal dilation of the infrarenal abdominal aorta  measuring up to 3.4 x 2.8 cm.     The appendix is large in caliber. There is no asymmetric fat stranding  within the right paracolic gutter when compared to the left, best  compared on coronal imaging and therefore findings are likely  physiologic.     There is no free intraperitoneal fluid or air.     The bladder is trabeculated and diffusely thickened. The prostate is  markedly enlarged.     Bone windows: There are no suspicious lytic or blastic osseous lesions  in the chest. Moderate-to-severe degenerative changes are present within  the thoracic and lumbar spine, most notably at L2-L3 and T6-T7.     IMPRESSION:  Impression:  1.  Yusra hepatic and mediastinal adenopathy as above. Findings are  nonspecific and may be reactive versus lymphoproliferative in etiology.  Comparison with prior imaging would be helpful. Given patient's history,  continued attention on follow-up is recommended to ensure stability.  2.  Mild splenomegaly concerning for lymphoproliferative involvement.  3.  Right-sided pulmonary nodules measuring up to 0.6 cm. Continued  close attention on follow-up is recommended to ensure stability given  patient's history.  4.  Focal aneurysmal dilation of the infrarenal abdominal aorta  measuring up to 3.4 cm.  5.  Pleural plaque along the anterior aspect of the right upper lobe as  above.  6.  Thickened, trabeculated bladder likely related to urinary outlet  obstruction from  moderate-to-severe prostatomegaly.  7.  Moderate-to-severe degenerative changes throughout the thoracic and  lumbar spine. This finding can be further evaluated with MRI if  clinically indicated.     This report was finalized on 6/10/2019 7:13 AM by Dr. Tigao Posada M.D.         Assessment/Plan  In summary I have an extremely healthy 78-year-old white male who looks like 57 who is extremely active with no limitations in lifestyle, sailing. Actually he has been sailing from [place] to [facility] and back to [place] to [place] and so forth who presented to Fide Parekh MD, Nephrologist last week with a white count of 152,000. Today his white count is 180,000. I have reviewed his peripheral blood under the microscope and most of his white cells are mature lymphocytes with typical smudge features. The white cell morphology shows no evidence of prolymphocytes and there are no blasts or plasma cells in circulation. The red cell morphology is normal, the platelet count and morphology is normal.     The patient was further reviewed on 06/11/2019. By surprise, we documented that the peripheral blood flow cytometry in this patient documented instead of CLL or mantle cell lymphoma an entity that is relatively rare that is called a marginal zone non-Hodgkin lymphoma. This condition can come in 3 different categories, one like mucosal-associated lymphoid tissue of the stomach, a 2nd kind is typically in elderly women with splenomegaly and lymphoma of the spleen and a 3rd kind is like the patient has. Obviously the patient has no B symptoms pertinent to the lymphoma and he has a performance status of 0.     I reviewed with him the CT scan of the abdomen that documents an abdominal aortic aneurysm and calcification of the aorta. Also the patient has minimal if any retroperitoneal adenopathy. Liver and spleen were normal to my eyes. In my opinion, the spleen is larger than what it should be. The patient had normal pelvic  anatomy with the exception of a huge, huge prostate gland that produces bladder outlet obstruction and produces thickening of the bladder wall. He had some trabeculation there as well.     From the point of view of the beta-2 microglobulin, this was elevated at 5. The creatinine was mildly elevated, not surprising, and also the patient had a normal LDH and a normal uric acid.     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 This entity typically will be treatable typically with Rituxan single-agent and even consideration of bendamustine in the long run. The problem that we are facing though is the patient is leaving for [place] in 10 days and we do not have time to give him treatment on many other issues pertinent to this. We are in the process of finding out some other blood counts on him before and if we document that this white count is stable around 150 maybe the patient will be able to take some Leukeran with him to [facility] and continue the Leukeran and when he returns back to [place] we will reassess his status and decide how to proceed.     I made a referral for him to be seen by Vascular Surgery. His abdominal aortic aneurysm needs to be evaluated and followed up.     I also went ahead and performed bone marrow testing on him today.CONSENT WAS SIGNED, RISKS AND BENEFITS EXPLAINED TO PT AND WIFE.LEFT POSTERIOR PELVIC BONE WAS CLEANED AND PREPARED.LIDOCAINE 1% NO EPI 7 CC INJECTED INTO SKIN AND SOFT TISSUE AND PERIOSTEUM; JAMSHIDI NEEDLE UTILIZED FOR Easy access to the bone marrow cavity was obtained. Bones were extremely strong. He had an excellent bone marrow aspirate and a biopsy that was sent for regular pathology and flow cytometry. Chromosomal analysis was requested as well by FISH.     I would like the patient to proceed with his PET scan in the next 48 hours, review him  after that and make a care plan at that point. Very likely, the patient will require Leukeran to take on his trip to [place] and upon return, he will remain on this medicine and eventually he could get into therapy with Rituxan.     Eventually the patient needs to be referred to general urologist given his huge prostate gland and bladder outlet obstruction.     I did measure monoclonal proteins in this patient; not present whatsoever.     On the other hand, his IgG level was a little bit on the low side not surprising for the kind of patient that he is.     I discussed all these facts with him and his wife and they were ready to proceed.     He will return back next Monday after his PET scan is done to review the situation and go from there.     Finally, I gave all the instructions to the patient’s wife in regard to postsurgical care of the bone marrow site including local alcohol utilization and occlusive bandage for the next 2 or 3 days. This should not mount a major turmoil in any way or form.

## 2019-06-12 ENCOUNTER — APPOINTMENT (OUTPATIENT)
Dept: LAB | Facility: HOSPITAL | Age: 79
End: 2019-06-12

## 2019-06-13 ENCOUNTER — HOSPITAL ENCOUNTER (OUTPATIENT)
Dept: PET IMAGING | Facility: HOSPITAL | Age: 79
Discharge: HOME | End: 2019-06-13

## 2019-06-13 ENCOUNTER — APPOINTMENT (OUTPATIENT)
Dept: PET IMAGING | Facility: HOSPITAL | Age: 79
End: 2019-06-13

## 2019-06-13 ENCOUNTER — HOSPITAL ENCOUNTER (OUTPATIENT)
Dept: PET IMAGING | Facility: HOSPITAL | Age: 79
Discharge: HOME | End: 2019-06-13
Admitting: INTERNAL MEDICINE

## 2019-06-13 DIAGNOSIS — C82.00 FOLLICULAR LYMPHOMA GRADE I, UNSPECIFIED BODY REGION (HCC): ICD-10-CM

## 2019-06-13 DIAGNOSIS — C82.07 FOLLICULAR LYMPHOMA GRADE I OF SPLEEN (HCC): ICD-10-CM

## 2019-06-13 LAB — GLUCOSE BLDC GLUCOMTR-MCNC: 107 MG/DL (ref 70–130)

## 2019-06-13 PROCEDURE — 0 FLUDEOXYGLUCOSE F18 SOLUTION: Performed by: INTERNAL MEDICINE

## 2019-06-13 PROCEDURE — 82962 GLUCOSE BLOOD TEST: CPT

## 2019-06-13 PROCEDURE — A9552 F18 FDG: HCPCS | Performed by: INTERNAL MEDICINE

## 2019-06-13 PROCEDURE — 78815 PET IMAGE W/CT SKULL-THIGH: CPT

## 2019-06-13 RX ADMIN — FLUDEOXYGLUCOSE F18 1 DOSE: 300 INJECTION INTRAVENOUS at 11:07

## 2019-06-14 ENCOUNTER — APPOINTMENT (OUTPATIENT)
Dept: LAB | Facility: HOSPITAL | Age: 79
End: 2019-06-14

## 2019-06-17 ENCOUNTER — DOCUMENTATION (OUTPATIENT)
Dept: ONCOLOGY | Facility: CLINIC | Age: 79
End: 2019-06-17

## 2019-06-17 ENCOUNTER — LAB (OUTPATIENT)
Dept: LAB | Facility: HOSPITAL | Age: 79
End: 2019-06-17

## 2019-06-17 ENCOUNTER — OFFICE VISIT (OUTPATIENT)
Dept: ONCOLOGY | Facility: CLINIC | Age: 79
End: 2019-06-17

## 2019-06-17 VITALS
TEMPERATURE: 97.9 F | SYSTOLIC BLOOD PRESSURE: 153 MMHG | DIASTOLIC BLOOD PRESSURE: 68 MMHG | RESPIRATION RATE: 12 BRPM | HEART RATE: 62 BPM | HEIGHT: 71 IN | OXYGEN SATURATION: 98 % | WEIGHT: 229.2 LBS | BODY MASS INDEX: 32.09 KG/M2

## 2019-06-17 DIAGNOSIS — C91.10 CLL (CHRONIC LYMPHOCYTIC LEUKEMIA) (HCC): Primary | ICD-10-CM

## 2019-06-17 DIAGNOSIS — C85.18 B-CELL LYMPHOMA OF LYMPH NODES OF MULTIPLE REGIONS, UNSPECIFIED B-CELL LYMPHOMA TYPE (HCC): Primary | ICD-10-CM

## 2019-06-17 DIAGNOSIS — C82.00 FOLLICULAR LYMPHOMA GRADE I, UNSPECIFIED BODY REGION (HCC): ICD-10-CM

## 2019-06-17 LAB
ALBUMIN SERPL-MCNC: 4.5 G/DL (ref 3.5–5.2)
ALBUMIN/GLOB SERPL: 1.7 G/DL (ref 1.1–2.4)
ALP SERPL-CCNC: 121 U/L (ref 38–116)
ALT SERPL W P-5'-P-CCNC: 17 U/L (ref 0–41)
ANION GAP SERPL CALCULATED.3IONS-SCNC: 14 MMOL/L
AST SERPL-CCNC: 18 U/L (ref 0–40)
BASOPHILS # BLD AUTO: 0.06 10*3/MM3 (ref 0–0.2)
BASOPHILS NFR BLD AUTO: 0 % (ref 0–1.5)
BILIRUB SERPL-MCNC: 0.3 MG/DL (ref 0.2–1.2)
BUN BLD-MCNC: 29 MG/DL (ref 6–20)
BUN/CREAT SERPL: 15.3 (ref 7.3–30)
CALCIUM SPEC-SCNC: 9.7 MG/DL (ref 8.5–10.2)
CHLORIDE SERPL-SCNC: 104 MMOL/L (ref 98–107)
CO2 SERPL-SCNC: 22 MMOL/L (ref 22–29)
CREAT BLD-MCNC: 1.89 MG/DL (ref 0.7–1.3)
DEPRECATED RDW RBC AUTO: 45.9 FL (ref 37–54)
EOSINOPHIL # BLD AUTO: 0.41 10*3/MM3 (ref 0–0.4)
EOSINOPHIL NFR BLD AUTO: 0.2 % (ref 0.3–6.2)
ERYTHROCYTE [DISTWIDTH] IN BLOOD BY AUTOMATED COUNT: 13.3 % (ref 12.3–15.4)
GFR SERPL CREATININE-BSD FRML MDRD: 35 ML/MIN/1.73
GLOBULIN UR ELPH-MCNC: 2.6 GM/DL (ref 1.8–3.5)
GLUCOSE BLD-MCNC: 113 MG/DL (ref 74–124)
HCT VFR BLD AUTO: 38.7 % (ref 37.5–51)
HGB BLD-MCNC: 12.2 G/DL (ref 13–17.7)
IMM GRANULOCYTES # BLD AUTO: 0.35 10*3/MM3 (ref 0–0.05)
IMM GRANULOCYTES NFR BLD AUTO: 0.2 % (ref 0–0.5)
LYMPHOCYTES # BLD AUTO: 157 10*3/MM3 (ref 0.7–3.1)
LYMPHOCYTES NFR BLD AUTO: 91.5 % (ref 19.6–45.3)
MCH RBC QN AUTO: 30.1 PG (ref 26.6–33)
MCHC RBC AUTO-ENTMCNC: 31.5 G/DL (ref 31.5–35.7)
MCV RBC AUTO: 95.6 FL (ref 79–97)
MONOCYTES # BLD AUTO: 8.98 10*3/MM3 (ref 0.1–0.9)
MONOCYTES NFR BLD AUTO: 5.2 % (ref 5–12)
NEUTROPHILS # BLD AUTO: 4.76 10*3/MM3 (ref 1.7–7)
NEUTROPHILS NFR BLD AUTO: 2.9 % (ref 42.7–76)
NRBC BLD AUTO-RTO: 0 /100 WBC (ref 0–0.2)
PLATELET # BLD AUTO: 165 10*3/MM3 (ref 140–450)
PMV BLD AUTO: 9.8 FL (ref 6–12)
POTASSIUM BLD-SCNC: 4.9 MMOL/L (ref 3.5–4.7)
PROT SERPL-MCNC: 7.1 G/DL (ref 6.3–8)
RBC # BLD AUTO: 4.05 10*6/MM3 (ref 4.14–5.8)
SODIUM BLD-SCNC: 140 MMOL/L (ref 134–145)
URATE SERPL-MCNC: 8.5 MG/DL (ref 2.8–7.4)
WBC NRBC COR # BLD: 171.56 10*3/MM3 (ref 3.4–10.8)

## 2019-06-17 PROCEDURE — 85025 COMPLETE CBC W/AUTO DIFF WBC: CPT | Performed by: INTERNAL MEDICINE

## 2019-06-17 PROCEDURE — 80053 COMPREHEN METABOLIC PANEL: CPT | Performed by: INTERNAL MEDICINE

## 2019-06-17 PROCEDURE — 99215 OFFICE O/P EST HI 40 MIN: CPT | Performed by: INTERNAL MEDICINE

## 2019-06-17 PROCEDURE — 84550 ASSAY OF BLOOD/URIC ACID: CPT | Performed by: INTERNAL MEDICINE

## 2019-06-17 PROCEDURE — 36415 COLL VENOUS BLD VENIPUNCTURE: CPT | Performed by: INTERNAL MEDICINE

## 2019-06-17 RX ORDER — ALLOPURINOL 300 MG/1
300 TABLET ORAL DAILY
Qty: 30 TABLET | Refills: 1 | Status: SHIPPED | OUTPATIENT
Start: 2019-06-17 | End: 2019-06-17 | Stop reason: SDUPTHER

## 2019-06-17 NOTE — PROGRESS NOTES
Subjective     REASON FOR FOLLOW UP: 1. 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  He is in company of his wife.  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  Amazing enough, the patient continues doing fine, having a normal lifestyle and ready to go to [place] tomorrow.  His appetite has remained normal.  He has not had any fevers, chills, night sweats or pruritus and has not had any peripheral adenopathy.  His bone marrow site healed with no difficulties whatsoever.  He has had normal bowel activity and normal urination.  He has not had any cardiorespiratory symptomatology.  He is going to be seen after he returns from [place] by vascular surgery in preparation for assessment of his intraabdominal aortic aneurysm.          Past Medical History:   Diagnosis Date   • Abnormal ECG    • BPH (benign prostatic hyperplasia)    • Chronic kidney disease    • Disease of thyroid gland    • Hyperlipidemia    • Hyperparathyroidism (CMS/HCC) 2017    parathyroidectomy   • Hypertension    • Nicotine dependence    • TB (pulmonary tuberculosis)         Past Surgical History:   Procedure Laterality Date   • PARATHYROID GLAND SURGERY     • TRIGGER FINGER RELEASE      Left 5th finger        Current Outpatient Medications on File Prior to Visit   Medication Sig Dispense Refill   • amLODIPine (NORVASC) 10 MG tablet Take 1 tablet by mouth Daily. 30 tablet 11   • Ascorbic Acid (VITAMIN C PO) Take  by mouth.     • aspirin 81 MG tablet Take 81 mg by mouth.     • furosemide (LASIX) 20 MG tablet Take 20 mg by mouth Daily.     •  HYDRALAZINE HCL PO Take 25 mg by mouth Daily.     • losartan (COZAAR) 100 MG tablet Take 100 mg by mouth Daily.     • METOPROLOL TARTRATE PO Take 25 mg by mouth Daily.     • Multiple Vitamins-Minerals (MULTIVITAL-M) tablet Take  by mouth Daily.     • Omega-3 Fatty Acids (FISH OIL) 1000 MG capsule capsule Take 1,000 mg by mouth Daily With Breakfast.     • VITAMIN E PO Take  by mouth.       No current facility-administered medications on file prior to visit.         ALLERGIES:    Allergies   Allergen Reactions   • Codeine Unknown (See Comments)     UNKNOWN          Social History     Socioeconomic History   • Marital status:      Spouse name: Not on file   • Number of children: 2   • Years of education: High school   • Highest education level: High school graduate   Occupational History     Employer: RETIRED   Social Needs   • Financial resource strain: Not hard at all   • Food insecurity:     Worry: Never true     Inability: Never true   • Transportation needs:     Medical: No     Non-medical: Not on file   Tobacco Use   • Smoking status: Current Every Day Smoker     Packs/day: 0.50     Years: 60.00     Pack years: 30.00     Types: Cigarettes   • Smokeless tobacco: Never Used   Substance and Sexual Activity   • Alcohol use: Yes     Alcohol/week: 1.2 oz     Types: 2 Cans of beer per week     Frequency: 2-4 times a month     Drinks per session: 1 or 2     Binge frequency: Never     Comment: 2-3 beers 1-2 days a week   • Drug use: No     Comment: 2 cups coffee daily   • Sexual activity: Defer     Partners: Female        Family History   Problem Relation Age of Onset   • Heart disease Mother    • Stroke Mother    • Diabetes Mother    • Hypertension Sister    • Diabetes Sister    • Cancer Father    • Leukemia Father         Review of Systems       General: no fever, no chills, no fatigue,no weight changes, no lack of appetite.  Eyes: no epiphora, xerophthalmia,conjunctivitis, pain, glaucoma, blurred vision,  "blindness, secretion, photophobia, proptosis, diplopia.  Ears: no otorrhea, tinnitus, otorrhagia, deafness, pain, vertigo.  Nose: no rhinorrhea, no epistaxis, no alteration in perception of odors, no sinuses pressure.  Mouth: no alteration in gums or teeth,  No ulcers, no difficulty with mastication or deglut ion, no odynophagia.  Neck: no masses or pain, no thyroid alterations, no pain in muscles or arteries, no carotid odynia, no crepitation.  Respiratory: no cough, no sputum production,no dyspnea,no trepopnea, no pleuritic pain,no hemoptysis.  Heart: no syncope, no irregularity, no palpitations, no angina,no orthopnea,no paroxysmal nocturnal dyspnea.  Vascular Venous: no tenderness,no edema,no palpable cords,no postphlebitic syndrome, no skin changes no ulcerations.  Vascular Arterial: no distal ischemia, noclaudication, no gangrene, no neuropathic ischemic pain, no skin ulcers, no paleness no cyanosis.  GI: no dysphagia, no odynophagia, no regurgitation, no heartburn,no indigestion,no nausea,no vomiting,no hematemesis ,no melena,no jaundice,no distention, no obstipation,no enterorrhagia,no proctalgia,no anal  lesions, no changes in bowel habits.  : no frequency, no hesitancy, no hematuria, no discharge,no  pain.  Musculoskeletal: no muscle or tendon pain or inflammation,no  joint pain, no edema, no functional limitation,no fasciculations, no mass.  Neurologic: no headache, no seizures, noalterations on Craneal nerves, no motor deficit, no sensory deficit, normal coordination, no alteration in memory,normal orientation, calculation,normal writting, verbal and written language.  Skin: no rashes,no pruritus no localized lesions.  Psychiatric: no anxiety, no depression,no agitation, no delusions, proper insight.          Objective     Vitals:    06/17/19 0831   BP: 153/68   Pulse: 62   Resp: 12   Temp: 97.9 °F (36.6 °C)   TempSrc: Oral   SpO2: 98%   Weight: 104 kg (229 lb 3.2 oz)   Height: 179.5 cm (70.67\") "   PainSc: 0-No pain     Current Status 6/17/2019   ECOG score 0       Physical Exam    GENERAL:  Well-developed, well-nourished  Patient  in no acute distress.   SKIN:  Warm, dry ,NO rashes,NO purpura ,NO petechiae.  HEENT:  Pupils were equal and reactive to light and accomodation, conjunctivas non injected, no pterigion, normal extraocular movements, normal visual acuity.   Mouth mucosa was moist, no exudates in oropharynx, normal gum line, normal roof of the mouth and pillars, normal papillations of the tongue.  NECK:  Supple with good range of motion; no thyromegaly LEFT PAROTID MASS 2 CM HARD NOT PAINFUL NOT MOBILE ATTACHED TO THE GLAND IN FRONT OF TRAGUS OF THE EAR. masses, no JVD or bruits, no cervical adenopathies.No carotid arteries pain, no carotid abnormal pulsation , NO arterial dance.  LYMPHATICS:  No cervical, NO supraclavicular, NO axillary,NO epitrochlear , NO inguinal adenopathy.  CHEST:  Normal excursion of both ephraim thoraces, normal voice fremitus, no subcutaneous emphysema, normal axillas, no rashes or acanthosis nigricans. Lungs clear to percussion and auscultation, normal breath sounds bilaterally, no wheezing,NO crackles NO ronchi, NO stridor, NO rubs.  CARDIAC AND VASCULAR:  normal rate and regular rhythm, without murmurs,NO rubs NO S3 NO S4 right or left . Normal femoral, popliteal, pedis, brachial and carotid pulses.  ABDOMEN:  Soft, nontender with no organomegaly or masses, no ascites, no collateral circulation,no distention,no Montross sign, no abdominal pain, no inguinal hernias,no umbilical hernia, no abdominal bruits. Normal bowel sounds.  GENITAL: Not  Performed.  EXTREMITIES  AND SPINE:  No clubbing, cyanosis or edema, no deformities or pain .No kyphosis, scoliosis, deformities or pain in spine, ribs or pelvic bone.  NEUROLOGICAL:  Patient was awake, alert, oriented to time, person and place.                  RECENT LABS:  Hematology WBC   Date Value Ref Range Status   06/17/2019 171.56  (C) 3.40 - 10.80 10*3/mm3 Final     RBC   Date Value Ref Range Status   06/17/2019 4.05 (L) 4.14 - 5.80 10*6/mm3 Final     Hemoglobin   Date Value Ref Range Status   06/17/2019 12.2 (L) 13.0 - 17.7 g/dL Final     Hematocrit   Date Value Ref Range Status   06/17/2019 38.7 37.5 - 51.0 % Final     Platelets   Date Value Ref Range Status   06/17/2019 165 140 - 450 10*3/mm3 Final          F-18 FDG PET FROM SKULL BASE TO MID THIGH WITH PET/CT FUSION     HISTORY: Non-Hodgkin's lymphoma, initial staging.     TECHNIQUE: Radiation dose reduction techniques were utilized, including  automated exposure control and exposure modulation based on body size.   Blood glucose level at time of injection was 107 mg/dL.  6.2 mCi of F-18  FDG were injected and PET was performed from skull base to mid thigh. CT  was obtained for localization and attenuation correction. Time at  injection 1100 hours. PET start time 1225 hours.      Compared with CT chest, abdomen and pelvis 06/06/2019.     FINDINGS:      There is intensely FDG avid soft tissue nodule within the right parotid  gland measuring 1 x 1.6 cm. The submandibular and thyroid glands  demonstrate symmetric FDG uptake.     There is no mediastinal, hilar or axillary adenopathy by size criteria.     The heart is normal in size.     Previously noted right upper lobe pleural plaque is not FDG avid.     There is no pulmonary consolidation, pleural effusion or pneumothorax.  No FDG avid pulmonary nodules are present.     No focal suspicious FDG avid lesions visualized within the liver,  gallbladder, pancreas, spleen, adrenal glands or kidneys. Spleen remains  mildly enlarged, measuring 14.2 cm, as before. The degree of FDG uptake  within the spleen is equal to that of the liver. Probable splenule  within the pancreatic tail. There is no hydronephrosis.     Mildly enlarged brandon hepatic lymph node measuring 1.2 cm in short axis  dimension demonstrates FDG uptake above that of blood pool with  a max  SUV of 5.4 (blood pool is 3.8).     Severe atherosclerosis of the abdominal aorta is present with infrarenal  aneurysmal dilation measuring up to 3.2 cm.     There is no free intraperitoneal air or fluid. Colonic diverticulosis is  present.     The prostate is markedly enlarged. Trabeculation of the superior aspect  of the bladder is present, as before.     Multilevel moderate-to-severe degenerative changes are present  throughout the thoracic and lumbar spine, most notably at L2-3. Focal  moderate-to-intense FDG uptake within the lateral aspect of the left  acetabulum with a max SUV of 8.8. A corresponding lytic or blastic  osseous lesion is not visualized in this location.     IMPRESSION:  1.  Intensely FDG avid nodule within the left parotid gland. Findings  may represent an FDG avid lymph node which given patient's history would  be concerning for lymphomatous involvement. However, the nodule may  represent a primary parotid neoplasm which unfortunately FDG avidity is  nonspecific as both benign and malignant parotid neoplasms can be FDG  avid. Correlation with any prior imaging is recommended if available to  establish long-term stability. If insufficient prior imaging is  available, at least continued close attention on follow-up is  recommended.  2.  Mildly enlarged brandon hepatic lymph node measuring 1.2 cm, as  before, demonstrates FDG uptake mildly above that of blood pool. All  findings remain nonspecific and may be reactive versus  lymphoproliferative in etiology, they raise concern for lymphomatous  involvement given history. Continued attention on follow-up is  recommended.  3.  Mild splenomegaly which does not demonstrate FDG avidity above that  of the liver. Unfortunately, this finding is also nonspecific and  continued attention on follow-up is recommended.  4.  Focal moderate to intense FDG uptake within the lateral aspect of  the left acetabulum without a corresponding abnormality on  noncontrast  CT. While findings are favored to be degenerative/reactive given  location, they are nonspecific and correlation with patient history is  recommended. This can be further evaluated with pelvic MRI with and  without contrast if clinically indicated.  5.  Other findings as above.     This report was finalized on 6/17/2019 7:23 AM by Dr. Tiago Posada M.D.    Assessment/Plan  In summary I have an extremely healthy 78-year-old white male who looks like 57 who is extremely active with no limitations in lifestyle, sailing. Actually he has been sailing from [place] to [facility] and back to [facility] to [place] and so forth who presented to Fide Parekh MD, Nephrologist last week with a white count of 152,000. Today his white count is 180,000. I have reviewed his peripheral blood under the microscope and most of his white cells are mature lymphocytes with typical smudge features. The white cell morphology shows no evidence of prolymphocytes and there are no blasts or plasma cells in circulation. The red cell morphology is normal, the platelet count and morphology is normal.     The patient was further reviewed on 06/11/2019. By surprise, we documented that the peripheral blood flow cytometry in this patient documented instead of CLL or mantle cell lymphoma an entity that is relatively rare that is called a marginal zone non-Hodgkin lymphoma. This condition can come in 3 different categories, one like mucosal-associated lymphoid tissue of the stomach, a 2nd kind is typically in elderly women with splenomegaly and lymphoma of the spleen and a 3rd kind is like the patient has. Obviously the patient has no B symptoms pertinent to the lymphoma and he has a performance status of 0.     I reviewed with him the CT scan of the abdomen that documents an abdominal aortic aneurysm and calcification of the aorta. Also the patient has minimal if any retroperitoneal adenopathy. Liver and spleen were normal to my eyes. In  my opinion, the spleen is larger than what it should be. The patient had normal pelvic anatomy with the exception of a huge, huge prostate gland that produces bladder outlet obstruction and produces thickening of the bladder wall. He had some trabeculation there as well.     From the point of view of the beta-2 microglobulin, this was elevated at 5. The creatinine was mildly elevated, not surprising, and also the patient had a normal LDH and a normal uric acid.     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 This entity typically will be treatable typically with Rituxan single-agent and even consideration of bendamustine in the long run. The problem that we are facing though is the patient is leaving for [place] in 10 days and we do not have time to give him treatment on many other issues pertinent to this. We are in the process of finding out some other blood counts on him before and if we document that this white count is stable around 150 maybe the patient will be able to take some Leukeran with him to [facility] and continue the Leukeran and when he returns back to [place] we will reassess his status and decide how to proceed.     I made a referral for him to be seen by Vascular Surgery. His abdominal aortic aneurysm needs to be evaluated and followed up.       On 06/17/2019, I discussed with the patient and his wife the report of the bone marrow testing that documented a hypercellular bone marrow 70% replaced by monoclonal population of low-grade B cell lymphocytic lymphoma classified as a marginal zone lymphoma.  There was no evidence of granuloma, carcinoma, vasculitis, viral inclusion or myeloblasts in this specimen.  FISH analysis of this material documented that the patient had 13q deletion and p53 deletion, KAREN and trisomy 12 were negative.  Analysis for BCL-1 was negative.       The PET scan was discussed with the patient and copies of the report provided to them shows minimal uptake in the spleen and no uptake in any other area with the exception of minimal, dubious to me, uptake in the portal lymph nodes, but most importantly a significant area of uptake in the left parotid gland.      In my opinion, I do believe the uptake in the left parotid gland is representation of a different kind of tumor that is very likely representation of a Warthin tumor.  Today, on physical examination, this was easily recognized and documented as stated above.    Therefore, the abnormal uptake in the parotid gland in my opinion is not pertinent or related to his lymphoma.      I discussed with the patient the need for treatment of his lymphoma. He is going to [place] tomorrow and in preparation for this I am going to initiate him on Leukeran 2 tablets a day along with allopurinol 300 mg daily.  He will take the medicine before he goes to bed.  I do not expect any nausea, vomiting, alopecia, anemia, leukopenia, thrombocytopenia, diarrhea, modification in any other aspect of his life with this medication at this dose.  My expectation is upon return from [place] his white count may be dropping and eventually he will become a candidate to receive IV Rituxan therapy in the office.      Even though Leukeran at this dose has a very low potential for tumor lysis syndrome I will advise the patient like I did today to go into high liquid consumption and he will initiate allopurinol at the dose of 300 mg daily for time being.  I made him aware that the allopurinol can produce a rash.      If any of the medications make him sick he will stop them and we will resume his assessment when he comes back from [place] the 1st week in July.      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  Again, on clinical grounds it is very obvious and I think is different from  the lymphoma he had.  He has had surgery before by ENT regarding his primary hyperparathyroidism and very likely the same surgery will be addressing this issue in the future.  Finally, the patient will see vascular surgery after he returns from [place].  This will require being addressed as well.  The patient was ready to proceed.     The dose of Leukeran will be 4 mg daily, allopurinol will be 300 mg daily.     Information about Leukeran was provided to him regarding chemo care educational material.

## 2019-06-17 NOTE — PROGRESS NOTES
Staff message rec from Tena CHUN RN about pts Leukeran. See below.    Tena Mohamud RN sent to [facility]   Phone Number: [phone]          Previous Messages      ----- Message -----   From: Yesi Mc   Sent: 6/17/2019  11:27 AM   To: [facility] of [place] [facility] according to the [facility] Clinical Pool     Pt's pharmacy says they do not have and Lukeran and he is leaving the country and needs it sent to somewhere else      I rec a call from Roland SALAZAR RN about this earlier and the local pharmacy usually can order this.    I have resent the rx to [facility]. It is on the list of medications they are able to dispense.

## 2019-06-17 NOTE — PROGRESS NOTES
Aamir at [facility] informed me that pts copay is $564.94. I contacted pt to see if this copay was affordable for him and he has told me that his wife asked [facility] to transfer the prescription to [facility]. Apparently, they have the medication in stock.     I have asked Aamir to cancel the rx.

## 2019-06-18 RX ORDER — ALLOPURINOL 300 MG/1
300 TABLET ORAL DAILY
Qty: 90 TABLET | Refills: 1 | Status: SHIPPED | OUTPATIENT
Start: 2019-06-18 | End: 2021-02-05

## 2019-06-28 LAB — REF LAB TEST METHOD: NORMAL

## 2019-07-17 ENCOUNTER — LAB (OUTPATIENT)
Dept: LAB | Facility: HOSPITAL | Age: 79
End: 2019-07-17

## 2019-07-17 ENCOUNTER — OFFICE VISIT (OUTPATIENT)
Dept: ONCOLOGY | Facility: CLINIC | Age: 79
End: 2019-07-17

## 2019-07-17 VITALS
SYSTOLIC BLOOD PRESSURE: 148 MMHG | RESPIRATION RATE: 14 BRPM | TEMPERATURE: 97.7 F | HEIGHT: 71 IN | HEART RATE: 58 BPM | DIASTOLIC BLOOD PRESSURE: 57 MMHG | WEIGHT: 235 LBS | BODY MASS INDEX: 32.9 KG/M2 | OXYGEN SATURATION: 97 %

## 2019-07-17 DIAGNOSIS — C83.08 SMALL B-CELL LYMPHOMA OF LYMPH NODES OF MULTIPLE REGIONS (HCC): Primary | ICD-10-CM

## 2019-07-17 DIAGNOSIS — C85.18 B-CELL LYMPHOMA OF LYMPH NODES OF MULTIPLE REGIONS, UNSPECIFIED B-CELL LYMPHOMA TYPE (HCC): ICD-10-CM

## 2019-07-17 LAB
ALBUMIN SERPL-MCNC: 4.4 G/DL (ref 3.5–5.2)
ALBUMIN/GLOB SERPL: 1.8 G/DL (ref 1.1–2.4)
ALP SERPL-CCNC: 117 U/L (ref 38–116)
ALT SERPL W P-5'-P-CCNC: 20 U/L (ref 0–41)
ANION GAP SERPL CALCULATED.3IONS-SCNC: 12.2 MMOL/L (ref 5–15)
AST SERPL-CCNC: 20 U/L (ref 0–40)
BASOPHILS # BLD AUTO: 0.13 10*3/MM3 (ref 0–0.2)
BASOPHILS NFR BLD AUTO: 0.1 % (ref 0–1.5)
BILIRUB SERPL-MCNC: 0.2 MG/DL (ref 0.2–1.2)
BUN BLD-MCNC: 23 MG/DL (ref 6–20)
BUN/CREAT SERPL: 13.8 (ref 7.3–30)
CALCIUM SPEC-SCNC: 9.4 MG/DL (ref 8.5–10.2)
CHLORIDE SERPL-SCNC: 104 MMOL/L (ref 98–107)
CO2 SERPL-SCNC: 23.8 MMOL/L (ref 22–29)
CREAT BLD-MCNC: 1.67 MG/DL (ref 0.7–1.3)
DEPRECATED RDW RBC AUTO: 47.7 FL (ref 37–54)
EOSINOPHIL # BLD AUTO: 0.41 10*3/MM3 (ref 0–0.4)
EOSINOPHIL NFR BLD AUTO: 0.2 % (ref 0.3–6.2)
ERYTHROCYTE [DISTWIDTH] IN BLOOD BY AUTOMATED COUNT: 13.8 % (ref 12.3–15.4)
GFR SERPL CREATININE-BSD FRML MDRD: 40 ML/MIN/1.73
GLOBULIN UR ELPH-MCNC: 2.4 GM/DL (ref 1.8–3.5)
GLUCOSE BLD-MCNC: 125 MG/DL (ref 74–124)
HCT VFR BLD AUTO: 36.1 % (ref 37.5–51)
HGB BLD-MCNC: 11 G/DL (ref 13–17.7)
IMM GRANULOCYTES # BLD AUTO: 0.36 10*3/MM3 (ref 0–0.05)
IMM GRANULOCYTES NFR BLD AUTO: 0.2 % (ref 0–0.5)
LYMPHOCYTES # BLD AUTO: 168.84 10*3/MM3 (ref 0.7–3.1)
LYMPHOCYTES NFR BLD AUTO: 93.6 % (ref 19.6–45.3)
MCH RBC QN AUTO: 29.5 PG (ref 26.6–33)
MCHC RBC AUTO-ENTMCNC: 30.5 G/DL (ref 31.5–35.7)
MCV RBC AUTO: 96.8 FL (ref 79–97)
MONOCYTES # BLD AUTO: 6.09 10*3/MM3 (ref 0.1–0.9)
MONOCYTES NFR BLD AUTO: 3.4 % (ref 5–12)
NEUTROPHILS # BLD AUTO: 4.64 10*3/MM3 (ref 1.7–7)
NEUTROPHILS NFR BLD AUTO: 2.5 % (ref 42.7–76)
NRBC BLD AUTO-RTO: 0 /100 WBC (ref 0–0.2)
PLATELET # BLD AUTO: 148 10*3/MM3 (ref 140–450)
PMV BLD AUTO: 10 FL (ref 6–12)
POTASSIUM BLD-SCNC: 4.5 MMOL/L (ref 3.5–4.7)
PROT SERPL-MCNC: 6.8 G/DL (ref 6.3–8)
RBC # BLD AUTO: 3.73 10*6/MM3 (ref 4.14–5.8)
SODIUM BLD-SCNC: 140 MMOL/L (ref 134–145)
URATE SERPL-MCNC: 4.2 MG/DL (ref 2.8–7.4)
WBC NRBC COR # BLD: 180.47 10*3/MM3 (ref 3.4–10.8)

## 2019-07-17 PROCEDURE — 80053 COMPREHEN METABOLIC PANEL: CPT | Performed by: INTERNAL MEDICINE

## 2019-07-17 PROCEDURE — 99215 OFFICE O/P EST HI 40 MIN: CPT | Performed by: INTERNAL MEDICINE

## 2019-07-17 PROCEDURE — 85025 COMPLETE CBC W/AUTO DIFF WBC: CPT | Performed by: INTERNAL MEDICINE

## 2019-07-17 PROCEDURE — 84550 ASSAY OF BLOOD/URIC ACID: CPT | Performed by: INTERNAL MEDICINE

## 2019-07-17 PROCEDURE — 36415 COLL VENOUS BLD VENIPUNCTURE: CPT | Performed by: INTERNAL MEDICINE

## 2019-07-17 RX ORDER — DIPHENHYDRAMINE HYDROCHLORIDE 50 MG/ML
50 INJECTION INTRAMUSCULAR; INTRAVENOUS AS NEEDED
Status: CANCELLED | OUTPATIENT
Start: 2019-07-23

## 2019-07-17 RX ORDER — MEPERIDINE HYDROCHLORIDE 50 MG/ML
25 INJECTION INTRAMUSCULAR; INTRAVENOUS; SUBCUTANEOUS
Status: CANCELLED | OUTPATIENT
Start: 2019-07-23 | End: 2019-07-23

## 2019-07-17 RX ORDER — SODIUM CHLORIDE 9 MG/ML
250 INJECTION, SOLUTION INTRAVENOUS ONCE
Status: CANCELLED | OUTPATIENT
Start: 2019-07-23

## 2019-07-17 RX ORDER — FAMOTIDINE 10 MG/ML
20 INJECTION, SOLUTION INTRAVENOUS AS NEEDED
Status: CANCELLED | OUTPATIENT
Start: 2019-07-23

## 2019-07-17 RX ORDER — SODIUM CHLORIDE 9 MG/ML
250 INJECTION, SOLUTION INTRAVENOUS ONCE
Status: CANCELLED | OUTPATIENT
Start: 2019-07-24

## 2019-07-17 RX ORDER — ACETAMINOPHEN 325 MG/1
650 TABLET ORAL ONCE
Status: CANCELLED | OUTPATIENT
Start: 2019-07-23

## 2019-07-17 RX ORDER — PALONOSETRON 0.05 MG/ML
0.25 INJECTION, SOLUTION INTRAVENOUS ONCE
Status: CANCELLED | OUTPATIENT
Start: 2019-07-23

## 2019-07-17 NOTE — PROGRESS NOTES
Subjective     REASON FOR FOLLOW UP: 1. 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 AAA pending evaluation by vascular surgery.    History of Present Illness This patient returns today to the office in company of his wife after he returned from [place] a few days go. On the trip he was taking Leukeran at a low dose along with allopurinol and actually surprising enough his white count did not raise so much like it was raising while here in [place] before the trip. 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 In any event, the patient had no issues in regard to the Leukeran with no nausea or vomiting. His appetite was excellent. He gained 10 pounds on his trip. His bowel activity and urination have remained normal. He has not had any fevers or chills. He has postponed his visit to Vascular Surgery given his schedule after return from the trip. He had no symptomatology pertinent to his abdominal aortic aneurysm.     The patient again denies any symptoms pertinent to his lymphoma with no B symptoms and no other issues.            Past Medical History:   Diagnosis Date   • Abnormal ECG    • BPH (benign prostatic hyperplasia)    • Chronic kidney disease    • Disease of thyroid gland    • Hyperlipidemia    • Hyperparathyroidism (CMS/HCC) 2017    parathyroidectomy   • Hypertension    • Nicotine dependence    • TB (pulmonary tuberculosis)         Past Surgical History:   Procedure Laterality Date   • PARATHYROID GLAND SURGERY     • TRIGGER FINGER RELEASE      Left 5th finger        Current Outpatient Medications on File Prior to Visit   Medication Sig Dispense Refill   • allopurinol (ZYLOPRIM) 300 MG tablet TAKE 1 TABLET BY MOUTH DAILY 90 tablet 1   • amLODIPine  (NORVASC) 10 MG tablet Take 1 tablet by mouth Daily. 30 tablet 11   • Ascorbic Acid (VITAMIN C PO) Take  by mouth.     • aspirin 81 MG tablet Take 81 mg by mouth.     • furosemide (LASIX) 20 MG tablet Take 20 mg by mouth Daily.     • HYDRALAZINE HCL PO Take 25 mg by mouth Daily.     • losartan (COZAAR) 100 MG tablet Take 100 mg by mouth Daily.     • METOPROLOL TARTRATE PO Take 25 mg by mouth Daily.     • Multiple Vitamins-Minerals (MULTIVITAL-M) tablet Take  by mouth Daily.     • Omega-3 Fatty Acids (FISH OIL) 1000 MG capsule capsule Take 1,000 mg by mouth Daily With Breakfast.     • VITAMIN E PO Take  by mouth.     • [DISCONTINUED] chlorambucil (LEUKERAN) 2 MG chemo tablet Take 2 tablets by mouth Daily. 60 tablet 1     No current facility-administered medications on file prior to visit.         ALLERGIES:    Allergies   Allergen Reactions   • Codeine Unknown (See Comments)     UNKNOWN          Social History     Socioeconomic History   • Marital status:      Spouse name: Not on file   • Number of children: 2   • Years of education: High school   • Highest education level: High school graduate   Occupational History     Employer: RETIRED   Social Needs   • Financial resource strain: Not hard at all   • Food insecurity:     Worry: Never true     Inability: Never true   • Transportation needs:     Medical: No     Non-medical: Not on file   Tobacco Use   • Smoking status: Current Every Day Smoker     Packs/day: 0.50     Years: 60.00     Pack years: 30.00     Types: Cigarettes   • Smokeless tobacco: Never Used   Substance and Sexual Activity   • Alcohol use: Yes     Alcohol/week: 1.2 oz     Types: 2 Cans of beer per week     Frequency: 2-4 times a month     Drinks per session: 1 or 2     Binge frequency: Never     Comment: 2-3 beers 1-2 days a week   • Drug use: No     Comment: 2 cups coffee daily   • Sexual activity: Defer     Partners: Female        Family History   Problem Relation Age of Onset   • Heart  disease Mother    • Stroke Mother    • Diabetes Mother    • Hypertension Sister    • Diabetes Sister    • Cancer Father    • Leukemia Father         Review of Systems       General: no fever, no chills, no fatigue,no weight changes, no lack of appetite.  Eyes: no epiphora, xerophthalmia,conjunctivitis, pain, glaucoma, blurred vision, blindness, secretion, photophobia, proptosis, diplopia.  Ears: no otorrhea, tinnitus, otorrhagia, deafness, pain, vertigo.  Nose: no rhinorrhea, no epistaxis, no alteration in perception of odors, no sinuses pressure.  Mouth: no alteration in gums or teeth,  No ulcers, no difficulty with mastication or deglut ion, no odynophagia.  Neck: no masses or pain, no thyroid alterations, no pain in muscles or arteries, no carotid odynia, no crepitation.  Respiratory: no cough, no sputum production,no dyspnea,no trepopnea, no pleuritic pain,no hemoptysis.  Heart: no syncope, no irregularity, no palpitations, no angina,no orthopnea,no paroxysmal nocturnal dyspnea.  Vascular Venous: no tenderness,no edema,no palpable cords,no postphlebitic syndrome, no skin changes no ulcerations.  Vascular Arterial: no distal ischemia, noclaudication, no gangrene, no neuropathic ischemic pain, no skin ulcers, no paleness no cyanosis.  GI: no dysphagia, no odynophagia, no regurgitation, no heartburn,no indigestion,no nausea,no vomiting,no hematemesis ,no melena,no jaundice,no distention, no obstipation,no enterorrhagia,no proctalgia,no anal  lesions, no changes in bowel habits.  : no frequency, no hesitancy, no hematuria, no discharge,no  pain.  Musculoskeletal: no muscle or tendon pain or inflammation,no  joint pain, no edema, no functional limitation,no fasciculations, no mass.  Neurologic: no headache, no seizures, noalterations on Craneal nerves, no motor deficit, no sensory deficit, normal coordination, no alteration in memory,normal orientation, calculation,normal writting, verbal and written  "language.  Skin: no rashes,no pruritus no localized lesions.  Psychiatric: no anxiety, no depression,no agitation, no delusions, proper insight.            Objective     Vitals:    07/17/19 1624   BP: 148/57   Pulse: 58   Resp: 14   Temp: 97.7 °F (36.5 °C)   TempSrc: Oral   SpO2: 97%   Weight: 107 kg (235 lb)   Height: 179.5 cm (70.67\")   PainSc: 0-No pain     Current Status 7/17/2019   ECOG score 0       Physical Exam    GENERAL:  Well-developed, well-nourished  Patient  in no acute distress.   SKIN:  Warm, dry ,NO rashes,NO purpura ,NO petechiae.  HEENT:  Pupils were equal and reactive to light and accomodation, conjunctivas non injected, no pterigion, normal extraocular movements, normal visual acuity.   Mouth mucosa was moist, no exudates in oropharynx, normal gum line, normal roof of the mouth and pillars, normal papillations of the tongue.  NECK:  Supple with good range of motion; no thyromegaly or masses, no JVD or bruits, no cervical adenopathies.No carotid arteries pain, no carotid abnormal pulsation , NO arterial dance.minimal left parotid gland enlargement no pain  LYMPHATICS:  No cervical, NO supraclavicular, NO axillary,NO epitrochlear , NO inguinal adenopathy.  CHEST:  Normal excursion of both ephraim thoraces, normal voice fremitus, no subcutaneous emphysema, normal axillas, no rashes or acanthosis nigricans. Lungs clear to percussion and auscultation, normal breath sounds bilaterally, no wheezing,NO crackles NO ronchi, NO stridor, NO rubs.  CARDIAC AND VASCULAR:  normal rate and regular rhythm, without murmurs,NO rubs NO S3 NO S4 right or left . Normal femoral, popliteal, pedis, brachial and carotid pulses.  ABDOMEN:  Soft, nontender with no organomegaly or masses, no ascites, no collateral circulation,no distention,no Nirav sign, no abdominal pain, no inguinal hernias,no umbilical hernia, no abdominal bruits. Normal bowel sounds.  GENITAL: Not  Performed.  EXTREMITIES  AND SPINE:  No clubbing, " cyanosis or edema, no deformities or pain .No kyphosis, scoliosis, deformities or pain in spine, ribs or pelvic bone.  NEUROLOGICAL:  Patient was awake, alert, oriented to time, person and place.                      RECENT LABS:  Hematology WBC   Date Value Ref Range Status   07/17/2019 180.47 (C) 3.40 - 10.80 10*3/mm3 Final     RBC   Date Value Ref Range Status   07/17/2019 3.73 (L) 4.14 - 5.80 10*6/mm3 Final     Hemoglobin   Date Value Ref Range Status   07/17/2019 11.0 (L) 13.0 - 17.7 g/dL Final     Hematocrit   Date Value Ref Range Status   07/17/2019 36.1 (L) 37.5 - 51.0 % Final     Platelets   Date Value Ref Range Status   07/17/2019 148 140 - 450 10*3/mm3 Final              Assessment/Plan  In summary I have an extremely healthy 78-year-old white male who looks like 57 who is extremely active with no limitations in lifestyle, sailing. Actually he has been sailing from [place] to [facility] and back to [facility] to [place] and so forth who presented to Fide Parekh MD, Nephrologist last week with a white count of 152,000. Today his white count is 180,000. I have reviewed his peripheral blood under the microscope and most of his white cells are mature lymphocytes with typical smudge features. The white cell morphology shows no evidence of prolymphocytes and there are no blasts or plasma cells in circulation. The red cell morphology is normal, the platelet count and morphology is normal.     The patient was further reviewed on 06/11/2019. By surprise, we documented that the peripheral blood flow cytometry in this patient documented instead of CLL or mantle cell lymphoma an entity that is relatively rare that is called a marginal zone non-Hodgkin lymphoma. This condition can come in 3 different categories, one like mucosal-associated lymphoid tissue of the stomach, a 2nd kind is typically in elderly women with splenomegaly and lymphoma of the spleen and a 3rd kind is like the patient has. Obviously the patient  has no B symptoms pertinent to the lymphoma and he has a performance status of 0.     I reviewed with him the CT scan of the abdomen that documents an abdominal aortic aneurysm and calcification of the aorta. Also the patient has minimal if any retroperitoneal adenopathy. Liver and spleen were normal to my eyes. In my opinion, the spleen is larger than what it should be. The patient had normal pelvic anatomy with the exception of a huge, huge prostate gland that produces bladder outlet obstruction and produces thickening of the bladder wall. He had some trabeculation there as well.     From the point of view of the beta-2 microglobulin, this was elevated at 5. The creatinine was mildly elevated, not surprising, and also the patient had a normal LDH and a normal uric acid.         Since the previous visit, the patient had a successful trip to [place]. He was taking Leukeran 4 mg a day along with allopurinol 300 mg a day during the trip. He had no issues or consequences and actually comparing how his white count was raising before the trip, now I think the Leukeran was able to put some breaks into his disease process. It caught my attention also the significant improvement in his creatinine from 1.8 to 1.6 today. Nevertheless, the patient’s white count has risen. He has no B symptoms. He has no bulky disease by clinical examination or radiological analysis besides the leukemia component of his lymphoma. Given these facts, I advised the patient and wife the followin. The patient will proceed with chemotherapy education and consent for bendamustine and Rituxan. In preparation for Rituxan therapy, he will have hepatitis B serology. He will be educated by nurse practitioner about these 2 medicines and he will be ready for initiation of treatment next week. In preparation for the treatment on day 1, the patient will receive normal saline hydration 1000 mL; the same amount on day 2 and the same amount he will receive  when he comes back to be checked on day 4. He will have a CBC, CMP, LDH, uric acid and phosphorus on days 1 and 2 and day 4 as well.     I discussed with him the frequency of the treatments once a month, a total of 5 treatments planned, and hopefully by the time that the patient achieves remission, he will be placed on a maintenance dose of Rituxan every 3 months for a total of 2 years.     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 No intervention will be necessary. This will be monitored through clinical examination.     In regard to his abdominal aortic aneurysm, the patient will be seen by Junaid Crockett MD, in a few weeks.     In regard to his high creatinine, actually this number is improving to 1.6. The hydration part of the treatment next week will have in consideration minimized the possibility for tumor lysis syndrome.   2. In regard to allopurinol, he will remain on a full dose at 300 mg a day in spite of his creatinine. His uric acid is already 4.8 today and this will be a good issue to start his treatment next week.     The patient will require reassessment for toxicity on the 2nd week after the treatment. He will require weekly blood counts as well, weekly CMP, LDH, uric acid at least for the 1st month. I expect a very abrupt drop in his white count and the patient is aware of that.     I discussed with him side effects of Rituxan including chills and fever during the infusion and shortness of breath. I also discussed with him bendamustine including anemia, leukopenia, thrombocytopenia as main side effects.     The patient understands that this treatment is palliative, not curative, but very likely he will be able to achieve a long-term remission with this regimen.     I discussed all these facts with the patient and his wife in detail.

## 2019-07-18 DIAGNOSIS — C83.08 SMALL B-CELL LYMPHOMA OF LYMPH NODES OF MULTIPLE REGIONS (HCC): ICD-10-CM

## 2019-07-18 RX ORDER — SODIUM CHLORIDE 9 MG/ML
1000 INJECTION, SOLUTION INTRAVENOUS ONCE
Status: CANCELLED
Start: 2019-07-23 | End: 2019-07-22

## 2019-07-18 RX ORDER — SODIUM CHLORIDE 9 MG/ML
1000 INJECTION, SOLUTION INTRAVENOUS ONCE
Status: CANCELLED
Start: 2019-07-26 | End: 2019-07-25

## 2019-07-18 RX ORDER — SODIUM CHLORIDE 9 MG/ML
1000 INJECTION, SOLUTION INTRAVENOUS ONCE
Status: CANCELLED
Start: 2019-07-24 | End: 2019-07-23

## 2019-07-18 NOTE — PROGRESS NOTES
Per inbox request from Dr Herrera- 1LNS added to D1,2 and D4 added with additional labs, uric acid, ldh and phos stat added for each day as well.

## 2019-07-19 ENCOUNTER — APPOINTMENT (OUTPATIENT)
Dept: LAB | Facility: HOSPITAL | Age: 79
End: 2019-07-19

## 2019-07-19 ENCOUNTER — OFFICE VISIT (OUTPATIENT)
Dept: ONCOLOGY | Facility: CLINIC | Age: 79
End: 2019-07-19

## 2019-07-19 VITALS — BODY MASS INDEX: 32.83 KG/M2 | WEIGHT: 233.2 LBS

## 2019-07-19 DIAGNOSIS — C83.08 SMALL B-CELL LYMPHOMA OF LYMPH NODES OF MULTIPLE REGIONS (HCC): Primary | ICD-10-CM

## 2019-07-19 PROCEDURE — 99215 OFFICE O/P EST HI 40 MIN: CPT | Performed by: NURSE PRACTITIONER

## 2019-07-19 RX ORDER — ACYCLOVIR 400 MG/1
400 TABLET ORAL DAILY
Qty: 60 TABLET | Refills: 5 | Status: SHIPPED | OUTPATIENT
Start: 2019-07-19 | End: 2021-06-25

## 2019-07-19 RX ORDER — ONDANSETRON HYDROCHLORIDE 8 MG/1
8 TABLET, FILM COATED ORAL 3 TIMES DAILY PRN
Qty: 30 TABLET | Refills: 5 | Status: SHIPPED | OUTPATIENT
Start: 2019-07-19 | End: 2021-06-25

## 2019-07-19 NOTE — PROGRESS NOTES
____________________PATIENT EDUCATION____________________    PATIENT EDUCATION:  Today I met with the patient to discuss the chemotherapy regimen recommended for treatment of his disease.  The patient was given explanation of treatment premed side effects including office policy that prohibits patients to drive if sedating medications are administered, MD explanation given regarding benefits, side effects, toxicities and goals of treatment.  The patient received a Chemotherapy/Biotherapy Plan Summary including diagnosis and specific treatment plan.    SIDE EFFECTS:  Common side effects were discussed with the patient and/or significant other.  Discussion included hair loss/discoloration, anemia/fatigue, infection/chills/fever, appetite, bleeding risk/precautions, constipation, diarrhea, mouth sores, taste alteration, loss of appetite,nausea/vomiting, peripheral neuropathy, skin/nail changes, rash, muscle aches/weakness, photosensitivity, weight gain/loss, hearing loss, dizziness, menopausal symptoms, menstrrual irregularity, sterility, high blood pressure, heart damage, liver damage, lung damage, kidney damage, DVT/PE risk, fluid retention, pleural/pericardial effusion, somnolence, electrolyte/LFT imbalance, vein exercises and/or the possible need for vascular access/port placement.  The patient was advice that although uncommon, leakage of an infused medication from the vein or venous access device (port) may lead to skin breakdown and/or other tissue damage.  The patient was advised that he/she may have pain, bleeding, and/or bruising from the insertion of a needle in their vein or venous access device (port).  The patient was further advised that, in spite of proper technique, infection with redness and irritation may rarely occur at the site where the needle was inserted.  The patient was advised that if complications occur, additional medical treatment is available.    Discussion also included side effects  specific to drugs in the treatment plan, specifically Bendamustine/Rituxan.    Survivorship referral placed if appropriate - No. Palliative Care.    PHYSICAL EXAM:  In no acute distress.  Awake, alert, appropriately verbal.  Breathing unlabored, no apparent use of accessory muscles of respiration.  Cranial nerves II-XII grossly intact, no focal deficits.  Appropriate affect.  Asking appropriate questions.    This patient is at increased risk for tumor lysis syndrome.  We will hydrate him and check labs on a more frequent basis with cycle 1 consequently.    A total of 40 minutes were spent with the patient, with 100% of time spent in education and counseling.

## 2019-07-23 ENCOUNTER — OFFICE VISIT (OUTPATIENT)
Dept: ONCOLOGY | Facility: CLINIC | Age: 79
End: 2019-07-23

## 2019-07-23 ENCOUNTER — INFUSION (OUTPATIENT)
Dept: ONCOLOGY | Facility: HOSPITAL | Age: 79
End: 2019-07-23

## 2019-07-23 ENCOUNTER — LAB (OUTPATIENT)
Dept: LAB | Facility: HOSPITAL | Age: 79
End: 2019-07-23

## 2019-07-23 VITALS
HEIGHT: 71 IN | SYSTOLIC BLOOD PRESSURE: 173 MMHG | OXYGEN SATURATION: 97 % | RESPIRATION RATE: 14 BRPM | DIASTOLIC BLOOD PRESSURE: 66 MMHG | TEMPERATURE: 97.7 F | WEIGHT: 231.3 LBS | HEART RATE: 48 BPM | BODY MASS INDEX: 32.38 KG/M2

## 2019-07-23 VITALS — SYSTOLIC BLOOD PRESSURE: 139 MMHG | HEART RATE: 56 BPM | DIASTOLIC BLOOD PRESSURE: 69 MMHG

## 2019-07-23 DIAGNOSIS — N18.30 STAGE 3 CHRONIC KIDNEY DISEASE (HCC): ICD-10-CM

## 2019-07-23 DIAGNOSIS — C83.08 SMALL B-CELL LYMPHOMA OF LYMPH NODES OF MULTIPLE REGIONS (HCC): ICD-10-CM

## 2019-07-23 DIAGNOSIS — C83.19 MANTLE CELL LYMPHOMA OF EXTRANODAL SITE EXCLUDING SPLEEN AND OTHER SOLID ORGANS (HCC): Primary | ICD-10-CM

## 2019-07-23 DIAGNOSIS — C83.08 SMALL B-CELL LYMPHOMA OF LYMPH NODES OF MULTIPLE REGIONS (HCC): Primary | ICD-10-CM

## 2019-07-23 PROBLEM — C83.10 MANTLE CELL LYMPHOMA (HCC): Status: ACTIVE | Noted: 2019-06-11

## 2019-07-23 LAB
ALBUMIN SERPL-MCNC: 4.5 G/DL (ref 3.5–5.2)
ALBUMIN/GLOB SERPL: 1.7 G/DL (ref 1.1–2.4)
ALP SERPL-CCNC: 123 U/L (ref 38–116)
ALT SERPL W P-5'-P-CCNC: 22 U/L (ref 0–41)
ANION GAP SERPL CALCULATED.3IONS-SCNC: 13.2 MMOL/L (ref 5–15)
AST SERPL-CCNC: 22 U/L (ref 0–40)
BASOPHILS # BLD AUTO: 0.09 10*3/MM3 (ref 0–0.2)
BASOPHILS NFR BLD AUTO: 0.1 % (ref 0–1.5)
BILIRUB SERPL-MCNC: 0.3 MG/DL (ref 0.2–1.2)
BUN BLD-MCNC: 26 MG/DL (ref 6–20)
BUN/CREAT SERPL: 15.3 (ref 7.3–30)
CALCIUM SPEC-SCNC: 10.2 MG/DL (ref 8.5–10.2)
CHLORIDE SERPL-SCNC: 101 MMOL/L (ref 98–107)
CO2 SERPL-SCNC: 24.8 MMOL/L (ref 22–29)
CREAT BLD-MCNC: 1.7 MG/DL (ref 0.7–1.3)
DEPRECATED RDW RBC AUTO: 48.8 FL (ref 37–54)
EOSINOPHIL # BLD AUTO: 0.45 10*3/MM3 (ref 0–0.4)
EOSINOPHIL NFR BLD AUTO: 0.3 % (ref 0.3–6.2)
ERYTHROCYTE [DISTWIDTH] IN BLOOD BY AUTOMATED COUNT: 14.1 % (ref 12.3–15.4)
GFR SERPL CREATININE-BSD FRML MDRD: 39 ML/MIN/1.73
GLOBULIN UR ELPH-MCNC: 2.6 GM/DL (ref 1.8–3.5)
GLUCOSE BLD-MCNC: 118 MG/DL (ref 74–124)
HBV SURFACE AB SER RIA-ACNC: REACTIVE
HBV SURFACE AG SERPL QL IA: NORMAL
HCT VFR BLD AUTO: 38.4 % (ref 37.5–51)
HGB BLD-MCNC: 12 G/DL (ref 13–17.7)
IMM GRANULOCYTES # BLD AUTO: 0.29 10*3/MM3 (ref 0–0.05)
IMM GRANULOCYTES NFR BLD AUTO: 0.2 % (ref 0–0.5)
LDH SERPL-CCNC: 191 U/L (ref 99–259)
LYMPHOCYTES # BLD AUTO: 155.15 10*3/MM3 (ref 0.7–3.1)
LYMPHOCYTES NFR BLD AUTO: 92.2 % (ref 19.6–45.3)
MCH RBC QN AUTO: 30.2 PG (ref 26.6–33)
MCHC RBC AUTO-ENTMCNC: 31.3 G/DL (ref 31.5–35.7)
MCV RBC AUTO: 96.7 FL (ref 79–97)
MONOCYTES # BLD AUTO: 7.73 10*3/MM3 (ref 0.1–0.9)
MONOCYTES NFR BLD AUTO: 4.6 % (ref 5–12)
NEUTROPHILS # BLD AUTO: 4.49 10*3/MM3 (ref 1.7–7)
NEUTROPHILS NFR BLD AUTO: 2.6 % (ref 42.7–76)
NRBC BLD AUTO-RTO: 0 /100 WBC (ref 0–0.2)
PHOSPHATE SERPL-MCNC: 3 MG/DL (ref 2.5–4.5)
PLATELET # BLD AUTO: 151 10*3/MM3 (ref 140–450)
PMV BLD AUTO: 9.5 FL (ref 6–12)
POTASSIUM BLD-SCNC: 5 MMOL/L (ref 3.5–4.7)
PROT SERPL-MCNC: 7.1 G/DL (ref 6.3–8)
RBC # BLD AUTO: 3.97 10*6/MM3 (ref 4.14–5.8)
SODIUM BLD-SCNC: 139 MMOL/L (ref 134–145)
URATE SERPL-MCNC: 6 MG/DL (ref 2.8–7.4)
WBC NRBC COR # BLD: 168.2 10*3/MM3 (ref 3.4–10.8)

## 2019-07-23 PROCEDURE — 36415 COLL VENOUS BLD VENIPUNCTURE: CPT | Performed by: INTERNAL MEDICINE

## 2019-07-23 PROCEDURE — 25010000002 DEXAMETHASONE SODIUM PHOSPHATE 100 MG/10ML SOLUTION: Performed by: INTERNAL MEDICINE

## 2019-07-23 PROCEDURE — 25010000002 RITUXIMAB 10 MG/ML SOLUTION 10 ML VIAL: Performed by: INTERNAL MEDICINE

## 2019-07-23 PROCEDURE — 99214 OFFICE O/P EST MOD 30 MIN: CPT | Performed by: NURSE PRACTITIONER

## 2019-07-23 PROCEDURE — 86706 HEP B SURFACE ANTIBODY: CPT | Performed by: INTERNAL MEDICINE

## 2019-07-23 PROCEDURE — 96361 HYDRATE IV INFUSION ADD-ON: CPT | Performed by: NURSE PRACTITIONER

## 2019-07-23 PROCEDURE — 25010000002 BENDAMUSTINE HCL 100 MG/4ML SOLUTION 4 ML VIAL: Performed by: INTERNAL MEDICINE

## 2019-07-23 PROCEDURE — 25010000002 DIPHENHYDRAMINE PER 50 MG: Performed by: INTERNAL MEDICINE

## 2019-07-23 PROCEDURE — 84100 ASSAY OF PHOSPHORUS: CPT | Performed by: INTERNAL MEDICINE

## 2019-07-23 PROCEDURE — 96375 TX/PRO/DX INJ NEW DRUG ADDON: CPT | Performed by: NURSE PRACTITIONER

## 2019-07-23 PROCEDURE — 80053 COMPREHEN METABOLIC PANEL: CPT | Performed by: INTERNAL MEDICINE

## 2019-07-23 PROCEDURE — 84550 ASSAY OF BLOOD/URIC ACID: CPT | Performed by: INTERNAL MEDICINE

## 2019-07-23 PROCEDURE — 25010000002 RITUXIMAB 10 MG/ML SOLUTION 50 ML VIAL: Performed by: INTERNAL MEDICINE

## 2019-07-23 PROCEDURE — 83615 LACTATE (LD) (LDH) ENZYME: CPT | Performed by: INTERNAL MEDICINE

## 2019-07-23 PROCEDURE — 25010000002 HYDROCORTISONE SODIUM SUCCINATE 100 MG RECONSTITUTED SOLUTION: Performed by: NURSE PRACTITIONER

## 2019-07-23 PROCEDURE — 96411 CHEMO IV PUSH ADDL DRUG: CPT | Performed by: NURSE PRACTITIONER

## 2019-07-23 PROCEDURE — 85025 COMPLETE CBC W/AUTO DIFF WBC: CPT | Performed by: INTERNAL MEDICINE

## 2019-07-23 PROCEDURE — 87340 HEPATITIS B SURFACE AG IA: CPT | Performed by: INTERNAL MEDICINE

## 2019-07-23 PROCEDURE — 96376 TX/PRO/DX INJ SAME DRUG ADON: CPT | Performed by: NURSE PRACTITIONER

## 2019-07-23 PROCEDURE — 96413 CHEMO IV INFUSION 1 HR: CPT | Performed by: NURSE PRACTITIONER

## 2019-07-23 PROCEDURE — 25010000002 PALONOSETRON PER 25 MCG: Performed by: INTERNAL MEDICINE

## 2019-07-23 RX ORDER — FAMOTIDINE 10 MG/ML
20 INJECTION, SOLUTION INTRAVENOUS AS NEEDED
Status: COMPLETED | OUTPATIENT
Start: 2019-07-23 | End: 2019-07-23

## 2019-07-23 RX ORDER — FAMOTIDINE 10 MG/ML
20 INJECTION, SOLUTION INTRAVENOUS ONCE
Status: CANCELLED | OUTPATIENT
Start: 2019-07-23

## 2019-07-23 RX ORDER — SODIUM CHLORIDE 9 MG/ML
1000 INJECTION, SOLUTION INTRAVENOUS ONCE
Status: COMPLETED | OUTPATIENT
Start: 2019-07-23 | End: 2019-07-23

## 2019-07-23 RX ORDER — PALONOSETRON 0.05 MG/ML
0.25 INJECTION, SOLUTION INTRAVENOUS ONCE
Status: COMPLETED | OUTPATIENT
Start: 2019-07-23 | End: 2019-07-23

## 2019-07-23 RX ORDER — ACETAMINOPHEN 325 MG/1
650 TABLET ORAL ONCE
Status: COMPLETED | OUTPATIENT
Start: 2019-07-23 | End: 2019-07-23

## 2019-07-23 RX ORDER — SODIUM CHLORIDE 9 MG/ML
250 INJECTION, SOLUTION INTRAVENOUS ONCE
Status: COMPLETED | OUTPATIENT
Start: 2019-07-23 | End: 2019-07-23

## 2019-07-23 RX ORDER — FAMOTIDINE 10 MG/ML
20 INJECTION, SOLUTION INTRAVENOUS ONCE
Status: COMPLETED | OUTPATIENT
Start: 2019-07-23 | End: 2019-07-23

## 2019-07-23 RX ADMIN — SODIUM CHLORIDE 250 ML: 9 INJECTION, SOLUTION INTRAVENOUS at 09:51

## 2019-07-23 RX ADMIN — HYDROCORTISONE SODIUM SUCCINATE 200 MG: 100 INJECTION, POWDER, FOR SOLUTION INTRAMUSCULAR; INTRAVENOUS at 11:58

## 2019-07-23 RX ADMIN — FAMOTIDINE 20 MG: 10 INJECTION, SOLUTION INTRAVENOUS at 11:46

## 2019-07-23 RX ADMIN — DEXAMETHASONE SODIUM PHOSPHATE 12 MG: 10 INJECTION, SOLUTION INTRAMUSCULAR; INTRAVENOUS at 10:09

## 2019-07-23 RX ADMIN — BENDAMUSTINE HYDROCHLORIDE 205 MG: 25 INJECTION, SOLUTION INTRAVENOUS at 11:03

## 2019-07-23 RX ADMIN — PALONOSETRON HYDROCHLORIDE 0.25 MG: 0.25 INJECTION, SOLUTION INTRAVENOUS at 09:51

## 2019-07-23 RX ADMIN — FAMOTIDINE 20 MG: 10 INJECTION, SOLUTION INTRAVENOUS at 09:51

## 2019-07-23 RX ADMIN — DIPHENHYDRAMINE HYDROCHLORIDE 50 MG: 50 INJECTION, SOLUTION INTRAMUSCULAR; INTRAVENOUS at 09:51

## 2019-07-23 RX ADMIN — ACETAMINOPHEN 650 MG: 325 TABLET, FILM COATED ORAL at 09:51

## 2019-07-23 RX ADMIN — SODIUM CHLORIDE 1000 ML: 900 INJECTION, SOLUTION INTRAVENOUS at 10:29

## 2019-07-23 RX ADMIN — RITUXIMAB 840 MG: 10 INJECTION, SOLUTION INTRAVENOUS at 11:17

## 2019-07-23 NOTE — PROGRESS NOTES
Subjective .     REASONS FOR FOLLOWUP: Marginal zone lymphoma    HISTORY OF PRESENT ILLNESS:  The patient is a 78 y.o. year old male who is here for follow-up with the above-mentioned history.    History of Present Illness   Mr. Lewis is a fascinating man, world traveler and avid , who has had progressive leukocytosis with peripheral blood flow cytometry documenting marginal zone lymphoma.  He was initially treated with low-dose Leukeran and allopurinol, but leukocytosis has worsened. Last week Dr. Herrera recommended he initiate therapy with bendamustine/Rituxan.  He has undergone formal chemotherapy education and is ready today to proceed with treatment.  Because his white blood cell count is markedly high, 168,000 today specifically, we do plan close monitoring for TLS.  He is scheduled not only for treatment today and tomorrow but also for repeat stat labs and hydration on a frequent basis throughout cycle 1.  He understands the rationale for this and the plan.  He denies other new concerns today.    He is eating well.  Reports normal bowel bladder function.  Denies any pain.    Past Medical History:   Diagnosis Date   • Abnormal ECG    • BPH (benign prostatic hyperplasia)    • Chronic kidney disease    • Disease of thyroid gland    • Hyperlipidemia    • Hyperparathyroidism (CMS/HCC) 2017    parathyroidectomy   • Hypertension    • Nicotine dependence    • TB (pulmonary tuberculosis)        ONCOLOGIC HISTORY:  (History from previous dates can be found in the separate document.)    Current Outpatient Medications on File Prior to Visit   Medication Sig Dispense Refill   • amLODIPine (NORVASC) 10 MG tablet Take 1 tablet by mouth Daily. 30 tablet 11   • Ascorbic Acid (VITAMIN C PO) Take  by mouth.     • aspirin 81 MG tablet Take 81 mg by mouth.     • furosemide (LASIX) 20 MG tablet Take 20 mg by mouth Daily.     • HYDRALAZINE HCL PO Take 25 mg by mouth Daily.     • losartan (COZAAR) 100 MG tablet Take 100 mg  by mouth Daily.     • METOPROLOL TARTRATE PO Take 25 mg by mouth Daily.     • Multiple Vitamins-Minerals (MULTIVITAL-M) tablet Take  by mouth Daily.     • Omega-3 Fatty Acids (FISH OIL) 1000 MG capsule capsule Take 1,000 mg by mouth Daily With Breakfast.     • VITAMIN E PO Take  by mouth.     • acyclovir (ZOVIRAX) 400 MG tablet Take 1 tablet by mouth Daily. 60 tablet 5   • allopurinol (ZYLOPRIM) 300 MG tablet TAKE 1 TABLET BY MOUTH DAILY 90 tablet 1   • ondansetron (ZOFRAN) 8 MG tablet Take 1 tablet by mouth 3 (Three) Times a Day As Needed for Nausea or Vomiting. 30 tablet 5     No current facility-administered medications on file prior to visit.        ALLERGIES:     Allergies   Allergen Reactions   • Codeine Unknown (See Comments)     UNKNOWN         Social History     Socioeconomic History   • Marital status:      Spouse name: Not on file   • Number of children: 2   • Years of education: High school   • Highest education level: High school graduate   Occupational History     Employer: RETIRED   Social Needs   • Financial resource strain: Not hard at all   • Food insecurity:     Worry: Never true     Inability: Never true   • Transportation needs:     Medical: No     Non-medical: Not on file   Tobacco Use   • Smoking status: Current Every Day Smoker     Packs/day: 0.50     Years: 60.00     Pack years: 30.00     Types: Cigarettes   • Smokeless tobacco: Never Used   Substance and Sexual Activity   • Alcohol use: Yes     Alcohol/week: 1.2 oz     Types: 2 Cans of beer per week     Frequency: 2-4 times a month     Drinks per session: 1 or 2     Binge frequency: Never     Comment: 2-3 beers 1-2 days a week   • Drug use: No     Comment: 2 cups coffee daily   • Sexual activity: Defer     Partners: Female         Cancer-related family history includes Cancer in his father.     Review of Systems   Constitutional: Negative.    HENT: Negative.    Eyes: Negative.    Respiratory: Negative.    Cardiovascular: Negative.   "  Gastrointestinal: Negative.    Endocrine: Negative.    Musculoskeletal: Negative.    Skin: Negative.    Neurological: Negative.    Hematological: Negative.    Psychiatric/Behavioral: Negative.      A comprehensive 14 point review of systems was performed and was negative except as mentioned.    Objective      Vitals:    07/23/19 0914   BP: 173/66   Pulse: (!) 48   Resp: 14   Temp: 97.7 °F (36.5 °C)   TempSrc: Oral   SpO2: 97%   Weight: 105 kg (231 lb 4.8 oz)   Height: 179.5 cm (70.67\")   PainSc: 0-No pain     Current Status 7/23/2019   ECOG score 0       Physical Exam   Constitutional: He appears well-developed and well-nourished. No distress.   HENT:   Head: Normocephalic and atraumatic.   Mouth/Throat: No oropharyngeal exudate.   Eyes: Conjunctivae and EOM are normal. Pupils are equal, round, and reactive to light.   Neck: Normal range of motion. Neck supple.   Cardiovascular: Regular rhythm. Bradycardia present.   Borderline bradycardic   Pulmonary/Chest: Effort normal and breath sounds normal. No respiratory distress.   Abdominal: Soft. Bowel sounds are normal. There is no hepatosplenomegaly. There is no tenderness.   Musculoskeletal: Normal range of motion. He exhibits no edema.   Lymphadenopathy:     He has no cervical adenopathy.     He has no axillary adenopathy.   Skin: Skin is warm and dry. No rash noted. He is not diaphoretic.   Psychiatric: He has a normal mood and affect. His behavior is normal.       RECENT LABS:  Results from last 7 days   Lab Units 07/23/19  0848 07/17/19  1601   WBC 10*3/mm3 168.20* 180.47*   NEUTROS ABS 10*3/mm3 4.49 4.64   HEMOGLOBIN g/dL 12.0* 11.0*   HEMATOCRIT % 38.4 36.1*   PLATELETS 10*3/mm3 151 148     Results from last 7 days   Lab Units 07/23/19  0848 07/17/19  1601   SODIUM mmol/L 139 140   POTASSIUM mmol/L 5.0* 4.5   CHLORIDE mmol/L 101 104   CO2 mmol/L 24.8 23.8   BUN mg/dL 26* 23*   CREATININE mg/dL 1.70* 1.67*   CALCIUM mg/dL 10.2 9.4   ALBUMIN g/dL 4.50 4.40 "   BILIRUBIN mg/dL 0.3 0.2   ALK PHOS U/L 123* 117*   ALT (SGPT) U/L 22 20   AST (SGOT) U/L 22 20   GLUCOSE mg/dL 118 125*             Assessment/Plan    1. Marginal zone lymphoma.  · Peripheral blood flow cytometry CD5/CD10/CD23 negative; positive for p53 and 13 Q deletion, negative for KAREN and trisomy 12.  · Bone marrow evaluation demonstrated involvement by low-grade B-cell lymphoma classified again as marginal zone lymphoma occupying 75% of the total marrow.  · Patient has no B symptoms, no bulky disease.    · Significant leukocytosis with WBC count for example today being 168,000.  · Patient already taking allopurinol 300 mg daily.  · Plan to initiate bendamustine/Rituxan today, 7/23/2019.  · Close follow-up planned to protect against TLS, specifically with return lab appointment on 7/26/2019 for CBC, CMP, LDH, uric acid along with IV fluids.  · He is otherwise scheduled for weekly labs including CBC, CMP, LDH, uric acid and possible IV fluids during the first cycle of therapy.  · Patient otherwise will follow-up with Dr. Herrera in 4 weeks when due for cycle 2 of therapy.  Planning 5-6 cycles of therapy if tolerated.  Thereafter he would be placed on maintenance Rituxan every 3 months for a total of 2 years.    2.  CKD, followed by PCP Dr. Dwyer.  Patient has had a creatinine around 1.6-1.8 for the last 3 years at least per review of records.  Creatinine 1.7 today, BUN 26.  We will need to monitor closely as he goes to therapy, especially during this first cycle.  As outlined above he will receive additional hydration with treatment today and as outlined above at least to cycle 1 as we get his disease under control.    3.  Abdominal otic aneurysm, followed by Dr. 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  Continue to monitor.    PLAN:  1. Proceed with cycle 1 bendamustine/Rituxan.  2. Patient will receive additional 1 L normal saline  today.  3. Patient will return on Friday, 7/26/2019 for CBC, CMP, LDH, uric acid and 1 L of normal saline.  4. Patient otherwise will return weekly for CBC, CMP, LDH, uric acid and iron review with possible IV fluids.  5. Patient has been educated to drink plenty of water at home as well.  6. Continue allopurinol 300 mg daily.  7. Initiate acyclovir 400 mg daily.  8. Patient will see Dr. Herrera in 4 weeks for cycle 2 of therapy.  9. Plan a total of 5-6 cycles of bendamustine/Rituxan.  Thereafter consider maintenance Rituxan every 3 months for a total of 2 years.    ADDENDUM: Patient receiving only 7 mL of Rituxan before having a reaction.  Specifically he developed feelings of warmth and nausea.  Initially he was given 20 mg of additional IV Pepcid.  Patient then began reporting of changes in vision and was diaphoretic and appreciated to be almost syncopal.  Patient thrown in Trendelenburg for blood pressure that was reading at 60/40.  Given 200 mg of Solu-Cortef.  Blood pressure improving and patient returning to baseline.  Per Dr. Herrera we will not give further Rituxan today.  We will attempt to give tomorrow and patient is here for second dose of bendamustine.              Cc:  Fide Parekh MD

## 2019-07-23 NOTE — PROGRESS NOTES
1143 complaining of nausea and being hot. rituxan stopped and NP to room.  1144 bp 156/67 hr 71  1146 pepcid 20 mg iv push given.  1152: pt c/o vision changes.  Per SelenaNP give Solucortef 200mg. Placed in Trendelenburg position.   1153: Solucortef 200mg given IV. BP 69/42 HR 45 O2 88%.  Oxygen applied at 3 Liters   1155: /52 HR 49 O2 94%.  Dr. Herrera at bedside.   1159 bp 114/56 hr 50 o2 sat 98 % feels back to normal.   1213 bp 123/67 hr 53  No complaints  1300 bp 132/74 hr 56 sat 100% per Dr Herrera no further rituxan today. To return tomorrow for 1 liter of NS and stat CBC and CMP.  Patient verbalized understanding.

## 2019-07-24 ENCOUNTER — TELEPHONE (OUTPATIENT)
Dept: ONCOLOGY | Facility: CLINIC | Age: 79
End: 2019-07-24

## 2019-07-24 ENCOUNTER — INFUSION (OUTPATIENT)
Dept: ONCOLOGY | Facility: HOSPITAL | Age: 79
End: 2019-07-24

## 2019-07-24 VITALS
TEMPERATURE: 97.9 F | WEIGHT: 239.4 LBS | SYSTOLIC BLOOD PRESSURE: 158 MMHG | RESPIRATION RATE: 14 BRPM | BODY MASS INDEX: 33.7 KG/M2 | HEART RATE: 68 BPM | OXYGEN SATURATION: 97 % | DIASTOLIC BLOOD PRESSURE: 69 MMHG

## 2019-07-24 DIAGNOSIS — C83.08 SMALL B-CELL LYMPHOMA OF LYMPH NODES OF MULTIPLE REGIONS (HCC): Primary | ICD-10-CM

## 2019-07-24 LAB
ALBUMIN SERPL-MCNC: 4.5 G/DL (ref 3.5–5.2)
ALBUMIN/GLOB SERPL: 1.7 G/DL (ref 1.1–2.4)
ALP SERPL-CCNC: 123 U/L (ref 38–116)
ALT SERPL W P-5'-P-CCNC: 26 U/L (ref 0–41)
ANION GAP SERPL CALCULATED.3IONS-SCNC: 16.3 MMOL/L (ref 5–15)
AST SERPL-CCNC: 22 U/L (ref 0–40)
BASOPHILS # BLD AUTO: 0.05 10*3/MM3 (ref 0–0.2)
BASOPHILS NFR BLD AUTO: 0.1 % (ref 0–1.5)
BILIRUB SERPL-MCNC: <0.2 MG/DL (ref 0.2–1.2)
BUN BLD-MCNC: 34 MG/DL (ref 6–20)
BUN/CREAT SERPL: 16.6 (ref 7.3–30)
CALCIUM SPEC-SCNC: 9.6 MG/DL (ref 8.5–10.2)
CHLORIDE SERPL-SCNC: 101 MMOL/L (ref 98–107)
CO2 SERPL-SCNC: 19.7 MMOL/L (ref 22–29)
CREAT BLD-MCNC: 2.05 MG/DL (ref 0.7–1.3)
DEPRECATED RDW RBC AUTO: 49.2 FL (ref 37–54)
EOSINOPHIL # BLD AUTO: 0.01 10*3/MM3 (ref 0–0.4)
EOSINOPHIL NFR BLD AUTO: 0 % (ref 0.3–6.2)
ERYTHROCYTE [DISTWIDTH] IN BLOOD BY AUTOMATED COUNT: 14.1 % (ref 12.3–15.4)
GFR SERPL CREATININE-BSD FRML MDRD: 32 ML/MIN/1.73
GLOBULIN UR ELPH-MCNC: 2.7 GM/DL (ref 1.8–3.5)
GLUCOSE BLD-MCNC: 244 MG/DL (ref 74–124)
HBV CORE AB SER DONR QL IA: NEGATIVE
HCT VFR BLD AUTO: 37.6 % (ref 37.5–51)
HGB BLD-MCNC: 12 G/DL (ref 13–17.7)
IMM GRANULOCYTES # BLD AUTO: 1.01 10*3/MM3 (ref 0–0.05)
IMM GRANULOCYTES NFR BLD AUTO: 1 % (ref 0–0.5)
LYMPHOCYTES # BLD AUTO: 73.87 10*3/MM3 (ref 0.7–3.1)
LYMPHOCYTES NFR BLD AUTO: 76.2 % (ref 19.6–45.3)
MCH RBC QN AUTO: 30.9 PG (ref 26.6–33)
MCHC RBC AUTO-ENTMCNC: 31.9 G/DL (ref 31.5–35.7)
MCV RBC AUTO: 96.9 FL (ref 79–97)
MONOCYTES # BLD AUTO: 5.27 10*3/MM3 (ref 0.1–0.9)
MONOCYTES NFR BLD AUTO: 5.4 % (ref 5–12)
NEUTROPHILS # BLD AUTO: 16.71 10*3/MM3 (ref 1.7–7)
NEUTROPHILS NFR BLD AUTO: 17.3 % (ref 42.7–76)
NRBC BLD AUTO-RTO: 0 /100 WBC (ref 0–0.2)
PLATELET # BLD AUTO: 147 10*3/MM3 (ref 140–450)
PMV BLD AUTO: 9.8 FL (ref 6–12)
POTASSIUM BLD-SCNC: 4.8 MMOL/L (ref 3.5–4.7)
PROT SERPL-MCNC: 7.2 G/DL (ref 6.3–8)
RBC # BLD AUTO: 3.88 10*6/MM3 (ref 4.14–5.8)
SODIUM BLD-SCNC: 137 MMOL/L (ref 134–145)
URATE SERPL-MCNC: 6.2 MG/DL (ref 2.8–7.4)
WBC NRBC COR # BLD: 96.92 10*3/MM3 (ref 3.4–10.8)

## 2019-07-24 PROCEDURE — 25010000002 DEXAMETHASONE SODIUM PHOSPHATE 100 MG/10ML SOLUTION: Performed by: INTERNAL MEDICINE

## 2019-07-24 PROCEDURE — 25010000002 DIPHENHYDRAMINE PER 50 MG: Performed by: INTERNAL MEDICINE

## 2019-07-24 PROCEDURE — 80053 COMPREHEN METABOLIC PANEL: CPT | Performed by: INTERNAL MEDICINE

## 2019-07-24 PROCEDURE — 25010000002 BENDAMUSTINE HCL 100 MG/4ML SOLUTION 4 ML VIAL: Performed by: INTERNAL MEDICINE

## 2019-07-24 PROCEDURE — 96415 CHEMO IV INFUSION ADDL HR: CPT | Performed by: INTERNAL MEDICINE

## 2019-07-24 PROCEDURE — 84550 ASSAY OF BLOOD/URIC ACID: CPT | Performed by: INTERNAL MEDICINE

## 2019-07-24 PROCEDURE — 96413 CHEMO IV INFUSION 1 HR: CPT | Performed by: INTERNAL MEDICINE

## 2019-07-24 PROCEDURE — 96375 TX/PRO/DX INJ NEW DRUG ADDON: CPT | Performed by: INTERNAL MEDICINE

## 2019-07-24 PROCEDURE — 85025 COMPLETE CBC W/AUTO DIFF WBC: CPT | Performed by: INTERNAL MEDICINE

## 2019-07-24 PROCEDURE — 96361 HYDRATE IV INFUSION ADD-ON: CPT | Performed by: INTERNAL MEDICINE

## 2019-07-24 PROCEDURE — 96411 CHEMO IV PUSH ADDL DRUG: CPT | Performed by: INTERNAL MEDICINE

## 2019-07-24 PROCEDURE — 25010000002 RITUXIMAB 10 MG/ML SOLUTION 10 ML VIAL: Performed by: INTERNAL MEDICINE

## 2019-07-24 RX ORDER — ACETAMINOPHEN 325 MG/1
650 TABLET ORAL ONCE
Status: COMPLETED | OUTPATIENT
Start: 2019-07-24 | End: 2019-07-24

## 2019-07-24 RX ORDER — FAMOTIDINE 10 MG/ML
20 INJECTION, SOLUTION INTRAVENOUS ONCE
Status: COMPLETED | OUTPATIENT
Start: 2019-07-24 | End: 2019-07-24

## 2019-07-24 RX ORDER — SODIUM CHLORIDE 9 MG/ML
250 INJECTION, SOLUTION INTRAVENOUS ONCE
Status: COMPLETED | OUTPATIENT
Start: 2019-07-24 | End: 2019-07-24

## 2019-07-24 RX ADMIN — RITUXIMAB 840 MG: 10 INJECTION, SOLUTION INTRAVENOUS at 10:23

## 2019-07-24 RX ADMIN — DIPHENHYDRAMINE HYDROCHLORIDE 50 MG: 50 INJECTION, SOLUTION INTRAMUSCULAR; INTRAVENOUS at 09:31

## 2019-07-24 RX ADMIN — SODIUM CHLORIDE 250 ML: 900 INJECTION, SOLUTION INTRAVENOUS at 09:08

## 2019-07-24 RX ADMIN — SODIUM CHLORIDE 1000 ML: 9 INJECTION, SOLUTION INTRAVENOUS at 09:08

## 2019-07-24 RX ADMIN — BENDAMUSTINE HYDROCHLORIDE 205 MG: 25 INJECTION, SOLUTION INTRAVENOUS at 10:08

## 2019-07-24 RX ADMIN — FAMOTIDINE 20 MG: 10 INJECTION, SOLUTION INTRAVENOUS at 09:31

## 2019-07-24 RX ADMIN — DEXAMETHASONE SODIUM PHOSPHATE 12 MG: 10 INJECTION, SOLUTION INTRAMUSCULAR; INTRAVENOUS at 09:14

## 2019-07-24 RX ADMIN — ACETAMINOPHEN 650 MG: 325 TABLET, FILM COATED ORAL at 09:31

## 2019-07-24 NOTE — TELEPHONE ENCOUNTER
----- Message from Kita Pastor RN sent at 7/24/2019  5:10 PM EDT -----  Regarding: Patient can get Day 2 of chemotherapy in [place] if he prefers  Patient states he was advised he could not get his chemo in [place] office, he can get his Day 2 if he desires. He goes there for everything except MD visit and Day 1 (only because it's on the same day of MD visit and Dr. Herrera does not go to [place]) but he can receive day 1 there.   Thanks,  Kita

## 2019-07-26 ENCOUNTER — INFUSION (OUTPATIENT)
Dept: ONCOLOGY | Facility: HOSPITAL | Age: 79
End: 2019-07-26

## 2019-07-26 ENCOUNTER — LAB (OUTPATIENT)
Dept: LAB | Facility: HOSPITAL | Age: 79
End: 2019-07-26

## 2019-07-26 VITALS
OXYGEN SATURATION: 97 % | TEMPERATURE: 97.6 F | HEART RATE: 75 BPM | SYSTOLIC BLOOD PRESSURE: 126 MMHG | WEIGHT: 241.9 LBS | BODY MASS INDEX: 34.05 KG/M2 | DIASTOLIC BLOOD PRESSURE: 83 MMHG

## 2019-07-26 DIAGNOSIS — C83.08 SMALL B-CELL LYMPHOMA OF LYMPH NODES OF MULTIPLE REGIONS (HCC): ICD-10-CM

## 2019-07-26 DIAGNOSIS — C83.08 SMALL B-CELL LYMPHOMA OF LYMPH NODES OF MULTIPLE REGIONS (HCC): Primary | ICD-10-CM

## 2019-07-26 LAB
ALBUMIN SERPL-MCNC: 4.1 G/DL (ref 3.5–5.2)
ALBUMIN/GLOB SERPL: 1.7 G/DL
ALP SERPL-CCNC: 125 U/L (ref 39–117)
ALT SERPL W P-5'-P-CCNC: 44 U/L (ref 1–41)
ANION GAP SERPL CALCULATED.3IONS-SCNC: 13.4 MMOL/L (ref 5–15)
AST SERPL-CCNC: 35 U/L (ref 1–40)
BASOPHILS # BLD AUTO: 0.1 10*3/MM3 (ref 0–0.2)
BASOPHILS NFR BLD AUTO: 0.1 % (ref 0–1.5)
BILIRUB SERPL-MCNC: 0.2 MG/DL (ref 0.2–1.2)
BUN BLD-MCNC: 39 MG/DL (ref 8–23)
BUN/CREAT SERPL: 21.2 (ref 7–25)
CALCIUM SPEC-SCNC: 8.4 MG/DL (ref 8.6–10.5)
CHLORIDE SERPL-SCNC: 104 MMOL/L (ref 98–107)
CO2 SERPL-SCNC: 21.6 MMOL/L (ref 22–29)
CREAT BLD-MCNC: 1.84 MG/DL (ref 0.76–1.27)
DEPRECATED RDW RBC AUTO: 50.9 FL (ref 37–54)
EOSINOPHIL # BLD AUTO: 0.07 10*3/MM3 (ref 0–0.4)
EOSINOPHIL NFR BLD AUTO: 0.1 % (ref 0.3–6.2)
ERYTHROCYTE [DISTWIDTH] IN BLOOD BY AUTOMATED COUNT: 14.7 % (ref 12.3–15.4)
GFR SERPL CREATININE-BSD FRML MDRD: 36 ML/MIN/1.73
GLOBULIN UR ELPH-MCNC: 2.4 GM/DL
GLUCOSE BLD-MCNC: 141 MG/DL (ref 65–99)
HCT VFR BLD AUTO: 35.2 % (ref 37.5–51)
HGB BLD-MCNC: 11.3 G/DL (ref 13–17.7)
IMM GRANULOCYTES # BLD AUTO: 0.22 10*3/MM3 (ref 0–0.05)
IMM GRANULOCYTES NFR BLD AUTO: 0.2 % (ref 0–0.5)
LDH SERPL-CCNC: 250 U/L (ref 135–225)
LYMPHOCYTES # BLD AUTO: 76.41 10*3/MM3 (ref 0.7–3.1)
LYMPHOCYTES NFR BLD AUTO: 85 % (ref 19.6–45.3)
MCH RBC QN AUTO: 30.5 PG (ref 26.6–33)
MCHC RBC AUTO-ENTMCNC: 32.1 G/DL (ref 31.5–35.7)
MCV RBC AUTO: 94.9 FL (ref 79–97)
MONOCYTES # BLD AUTO: 6.06 10*3/MM3 (ref 0.1–0.9)
MONOCYTES NFR BLD AUTO: 6.7 % (ref 5–12)
NEUTROPHILS # BLD AUTO: 6.99 10*3/MM3 (ref 1.7–7)
NEUTROPHILS NFR BLD AUTO: 7.9 % (ref 42.7–76)
PHOSPHATE SERPL-MCNC: 3.5 MG/DL (ref 2.5–4.5)
PLATELET # BLD AUTO: 99 10*3/MM3 (ref 140–450)
PMV BLD AUTO: 9.9 FL (ref 6–12)
POTASSIUM BLD-SCNC: 5 MMOL/L (ref 3.5–5.2)
PROT SERPL-MCNC: 6.5 G/DL (ref 6–8.5)
RBC # BLD AUTO: 3.71 10*6/MM3 (ref 4.14–5.8)
SODIUM BLD-SCNC: 139 MMOL/L (ref 136–145)
URATE SERPL-MCNC: 6.6 MG/DL (ref 3.4–7)
WBC NRBC COR # BLD: 89.85 10*3/MM3 (ref 3.4–10.8)

## 2019-07-26 PROCEDURE — 83615 LACTATE (LD) (LDH) ENZYME: CPT | Performed by: NURSE PRACTITIONER

## 2019-07-26 PROCEDURE — 84100 ASSAY OF PHOSPHORUS: CPT | Performed by: NURSE PRACTITIONER

## 2019-07-26 PROCEDURE — 96360 HYDRATION IV INFUSION INIT: CPT | Performed by: NURSE PRACTITIONER

## 2019-07-26 PROCEDURE — 96361 HYDRATE IV INFUSION ADD-ON: CPT | Performed by: NURSE PRACTITIONER

## 2019-07-26 PROCEDURE — 84550 ASSAY OF BLOOD/URIC ACID: CPT

## 2019-07-26 PROCEDURE — 85025 COMPLETE CBC W/AUTO DIFF WBC: CPT | Performed by: NURSE PRACTITIONER

## 2019-07-26 PROCEDURE — 80053 COMPREHEN METABOLIC PANEL: CPT | Performed by: NURSE PRACTITIONER

## 2019-07-26 RX ORDER — SODIUM CHLORIDE 9 MG/ML
1000 INJECTION, SOLUTION INTRAVENOUS ONCE
Status: COMPLETED | OUTPATIENT
Start: 2019-07-26 | End: 2019-07-26

## 2019-07-26 RX ADMIN — SODIUM CHLORIDE 1000 ML: 9 INJECTION, SOLUTION INTRAVENOUS at 09:15

## 2019-07-26 NOTE — PROGRESS NOTES
[facility] [place] [facility] s/w Dr. Herrera regarding today's lab results. Because some blood levels are increased (LDH,K+, and BUN for example), the pt was scheduled for repeat labs (cbc,ldh,cmp,and uric acid), APRN visit, and possible IVFs on Monday, 7/29/19. The pt and wife preferred these appointments to be at the [place] location for convenience. They were informed that Tuesday's appointments at [place] will be kept on the schedule just in case they are needed. The pt and spouse v/u.

## 2019-07-29 ENCOUNTER — INFUSION (OUTPATIENT)
Dept: ONCOLOGY | Facility: HOSPITAL | Age: 79
End: 2019-07-29

## 2019-07-29 ENCOUNTER — OFFICE VISIT (OUTPATIENT)
Dept: ONCOLOGY | Facility: CLINIC | Age: 79
End: 2019-07-29

## 2019-07-29 ENCOUNTER — APPOINTMENT (OUTPATIENT)
Dept: ONCOLOGY | Facility: HOSPITAL | Age: 79
End: 2019-07-29

## 2019-07-29 VITALS
HEIGHT: 71 IN | OXYGEN SATURATION: 97 % | SYSTOLIC BLOOD PRESSURE: 148 MMHG | DIASTOLIC BLOOD PRESSURE: 67 MMHG | TEMPERATURE: 97.8 F | RESPIRATION RATE: 14 BRPM | HEART RATE: 51 BPM | BODY MASS INDEX: 33.07 KG/M2 | WEIGHT: 236.2 LBS

## 2019-07-29 DIAGNOSIS — C83.08 SMALL B-CELL LYMPHOMA OF LYMPH NODES OF MULTIPLE REGIONS (HCC): Primary | ICD-10-CM

## 2019-07-29 DIAGNOSIS — C83.08 SMALL B-CELL LYMPHOMA OF LYMPH NODES OF MULTIPLE REGIONS (HCC): ICD-10-CM

## 2019-07-29 LAB
ALBUMIN SERPL-MCNC: 4.2 G/DL (ref 3.5–5.2)
ALBUMIN/GLOB SERPL: 1.7 G/DL
ALP SERPL-CCNC: 172 U/L (ref 39–117)
ALT SERPL W P-5'-P-CCNC: 86 U/L (ref 1–41)
ANION GAP SERPL CALCULATED.3IONS-SCNC: 11.7 MMOL/L (ref 5–15)
AST SERPL-CCNC: 34 U/L (ref 1–40)
BASOPHILS # BLD AUTO: 0.07 10*3/MM3 (ref 0–0.2)
BASOPHILS NFR BLD AUTO: 0.1 % (ref 0–1.5)
BILIRUB SERPL-MCNC: 0.3 MG/DL (ref 0.1–1.2)
BUN BLD-MCNC: 26 MG/DL (ref 8–23)
BUN/CREAT SERPL: 17 (ref 7–25)
CALCIUM SPEC-SCNC: 9.3 MG/DL (ref 8.6–10.5)
CHLORIDE SERPL-SCNC: 105 MMOL/L (ref 98–107)
CO2 SERPL-SCNC: 25.3 MMOL/L (ref 22–29)
CREAT BLD-MCNC: 1.53 MG/DL (ref 0.76–1.27)
DEPRECATED RDW RBC AUTO: 48.4 FL (ref 37–54)
EOSINOPHIL # BLD AUTO: 0.39 10*3/MM3 (ref 0–0.4)
EOSINOPHIL NFR BLD AUTO: 0.6 % (ref 0.3–6.2)
ERYTHROCYTE [DISTWIDTH] IN BLOOD BY AUTOMATED COUNT: 14.4 % (ref 12.3–15.4)
GFR SERPL CREATININE-BSD FRML MDRD: 44 ML/MIN/1.73
GLOBULIN UR ELPH-MCNC: 2.5 GM/DL
GLUCOSE BLD-MCNC: 133 MG/DL (ref 65–99)
HCT VFR BLD AUTO: 35 % (ref 37.5–51)
HGB BLD-MCNC: 11.5 G/DL (ref 13–17.7)
IMM GRANULOCYTES # BLD AUTO: 0.21 10*3/MM3 (ref 0–0.05)
IMM GRANULOCYTES NFR BLD AUTO: 0.3 % (ref 0–0.5)
LDH SERPL-CCNC: 184 U/L (ref 135–225)
LYMPHOCYTES # BLD AUTO: 56.31 10*3/MM3 (ref 0.7–3.1)
LYMPHOCYTES NFR BLD AUTO: 86.2 % (ref 19.6–45.3)
MCH RBC QN AUTO: 30.3 PG (ref 26.6–33)
MCHC RBC AUTO-ENTMCNC: 32.9 G/DL (ref 31.5–35.7)
MCV RBC AUTO: 92.3 FL (ref 79–97)
MONOCYTES # BLD AUTO: 3.79 10*3/MM3 (ref 0.1–0.9)
MONOCYTES NFR BLD AUTO: 5.8 % (ref 5–12)
NEUTROPHILS # BLD AUTO: 4.55 10*3/MM3 (ref 1.7–7)
NEUTROPHILS NFR BLD AUTO: 7 % (ref 42.7–76)
NRBC BLD AUTO-RTO: 0 /100 WBC (ref 0–0.2)
PHOSPHATE SERPL-MCNC: 3.7 MG/DL (ref 2.5–4.5)
PLAT MORPH BLD: NORMAL
PLATELET # BLD AUTO: 91 10*3/MM3 (ref 140–450)
PMV BLD AUTO: 10.4 FL (ref 6–12)
POTASSIUM BLD-SCNC: 4.7 MMOL/L (ref 3.5–5.2)
PROT SERPL-MCNC: 6.7 G/DL (ref 6–8.5)
RBC # BLD AUTO: 3.79 10*6/MM3 (ref 4.14–5.8)
RBC MORPH BLD: NORMAL
SODIUM BLD-SCNC: 142 MMOL/L (ref 136–145)
URATE SERPL-MCNC: 6.8 MG/DL (ref 3.4–7)
WBC MORPH BLD: NORMAL
WBC NRBC COR # BLD: 65.32 10*3/MM3 (ref 3.4–10.8)

## 2019-07-29 PROCEDURE — 83615 LACTATE (LD) (LDH) ENZYME: CPT | Performed by: INTERNAL MEDICINE

## 2019-07-29 PROCEDURE — 85025 COMPLETE CBC W/AUTO DIFF WBC: CPT | Performed by: INTERNAL MEDICINE

## 2019-07-29 PROCEDURE — 99213 OFFICE O/P EST LOW 20 MIN: CPT | Performed by: NURSE PRACTITIONER

## 2019-07-29 PROCEDURE — 85007 BL SMEAR W/DIFF WBC COUNT: CPT | Performed by: INTERNAL MEDICINE

## 2019-07-29 PROCEDURE — 84100 ASSAY OF PHOSPHORUS: CPT | Performed by: INTERNAL MEDICINE

## 2019-07-29 PROCEDURE — 80053 COMPREHEN METABOLIC PANEL: CPT | Performed by: INTERNAL MEDICINE

## 2019-07-29 PROCEDURE — 84550 ASSAY OF BLOOD/URIC ACID: CPT | Performed by: INTERNAL MEDICINE

## 2019-07-29 NOTE — PROGRESS NOTES
Subjective .     REASONS FOR FOLLOWUP: Marginal zone lymphoma    HISTORY OF PRESENT ILLNESS:  The patient is a 78 y.o. year old male who is here for follow-up with the above-mentioned history.    History of Present Illness patient is a 78-year-old male with the above-mentioned history who is here today for reevaluation.  He received his first cycle of Treanda Rituxan last week.  His Rituxan had to be delivered over 2 days, as after only receiving about 7 cc of his Rituxan he reacted.  He was able to go on and complete his Rituxan infusion on the second day.  He reports that he has been feeling actually pretty good.  He denies any issues with nausea, vomiting, diarrhea, or constipation.  He states he played golf over the weekend.  He is drinking about 60 mL's or more of water per day.  He continues on his allopurinol.    Past Medical History:   Diagnosis Date   • Abnormal ECG    • BPH (benign prostatic hyperplasia)    • Chronic kidney disease    • Disease of thyroid gland    • Hyperlipidemia    • Hyperparathyroidism (CMS/HCC) 2017    parathyroidectomy   • Hypertension    • Nicotine dependence    • TB (pulmonary tuberculosis)        ONCOLOGIC HISTORY:  (History from previous dates can be found in the separate document.)    Current Outpatient Medications on File Prior to Visit   Medication Sig Dispense Refill   • acyclovir (ZOVIRAX) 400 MG tablet Take 1 tablet by mouth Daily. 60 tablet 5   • allopurinol (ZYLOPRIM) 300 MG tablet TAKE 1 TABLET BY MOUTH DAILY 90 tablet 1   • amLODIPine (NORVASC) 10 MG tablet Take 1 tablet by mouth Daily. 30 tablet 11   • Ascorbic Acid (VITAMIN C PO) Take  by mouth.     • aspirin 81 MG tablet Take 81 mg by mouth.     • furosemide (LASIX) 20 MG tablet Take 20 mg by mouth Daily.     • HYDRALAZINE HCL PO Take 25 mg by mouth Daily.     • losartan (COZAAR) 100 MG tablet Take 100 mg by mouth Daily.     • METOPROLOL TARTRATE PO Take 25 mg by mouth Daily.     • Multiple Vitamins-Minerals  (MULTIVITAL-M) tablet Take  by mouth Daily.     • Omega-3 Fatty Acids (FISH OIL) 1000 MG capsule capsule Take 1,000 mg by mouth Daily With Breakfast.     • ondansetron (ZOFRAN) 8 MG tablet Take 1 tablet by mouth 3 (Three) Times a Day As Needed for Nausea or Vomiting. 30 tablet 5   • VITAMIN E PO Take  by mouth.       No current facility-administered medications on file prior to visit.        ALLERGIES:     Allergies   Allergen Reactions   • Codeine Unknown (See Comments)     UNKNOWN         Social History     Socioeconomic History   • Marital status:      Spouse name: Not on file   • Number of children: 2   • Years of education: High school   • Highest education level: High school graduate   Occupational History     Employer: RETIRED   Social Needs   • Financial resource strain: Not hard at all   • Food insecurity:     Worry: Never true     Inability: Never true   • Transportation needs:     Medical: No     Non-medical: Not on file   Tobacco Use   • Smoking status: Current Every Day Smoker     Packs/day: 0.50     Years: 60.00     Pack years: 30.00     Types: Cigarettes   • Smokeless tobacco: Never Used   Substance and Sexual Activity   • Alcohol use: Yes     Alcohol/week: 1.2 oz     Types: 2 Cans of beer per week     Frequency: 2-4 times a month     Drinks per session: 1 or 2     Binge frequency: Never     Comment: 2-3 beers 1-2 days a week   • Drug use: No     Comment: 2 cups coffee daily   • Sexual activity: Defer     Partners: Female         Cancer-related family history includes Cancer in his father.     Review of Systems   Constitutional: Negative for activity change, appetite change, chills, fatigue and fever.   HENT: Negative for mouth sores, nosebleeds and trouble swallowing.    Respiratory: Negative for cough and shortness of breath.    Cardiovascular: Negative for chest pain and leg swelling.   Gastrointestinal: Negative for abdominal pain, constipation, diarrhea, nausea and vomiting.  "  Genitourinary: Negative for difficulty urinating.   Skin: Negative for rash.   Neurological: Negative for dizziness, weakness and numbness.   Hematological: Negative for adenopathy. Does not bruise/bleed easily.   Psychiatric/Behavioral: Negative for sleep disturbance.         Objective      Vitals:    07/29/19 1014   BP: 148/67   Pulse: 51   Resp: 14   Temp: 97.8 °F (36.6 °C)   TempSrc: Oral   SpO2: 97%   Weight: 107 kg (236 lb 3.2 oz)   Height: 179.5 cm (70.67\")   PainSc: 0-No pain     Current Status 7/29/2019   ECOG score 0       Physical Exam   Constitutional: He is oriented to person, place, and time. He appears well-developed and well-nourished.   HENT:   Head: Normocephalic and atraumatic.   Nose: Nose normal.   Mouth/Throat: Oropharynx is clear and moist and mucous membranes are normal. No oropharyngeal exudate.   Eyes: Pupils are equal, round, and reactive to light.   Neck: Normal range of motion. Neck supple.   Cardiovascular: Normal rate, regular rhythm and normal heart sounds.   Pulmonary/Chest: Effort normal and breath sounds normal. No respiratory distress. He has no wheezes. He has no rhonchi. He has no rales.   Abdominal: Soft. Normal appearance and bowel sounds are normal. He exhibits no distension. There is no hepatosplenomegaly. There is no tenderness.   Musculoskeletal: Normal range of motion. He exhibits no edema.   Lymphadenopathy:     He has no cervical adenopathy.        Right: No supraclavicular adenopathy present.        Left: No supraclavicular adenopathy present.   Neurological: He is alert and oriented to person, place, and time.   Skin: Skin is warm and dry.   Psychiatric: He has a normal mood and affect. His behavior is normal.   Nursing note and vitals reviewed.      RECENT LABS:  Results from last 7 days   Lab Units 07/29/19  0859 07/26/19  0903 07/24/19  0837   WBC 10*3/mm3 65.32* 89.85* 96.92*   NEUTROS ABS 10*3/mm3 4.55 6.99 16.71*   HEMOGLOBIN g/dL 11.5* 11.3* 12.0* "   HEMATOCRIT % 35.0* 35.2* 37.6   PLATELETS 10*3/mm3 91* 99* 147     Results from last 7 days   Lab Units 07/29/19  0859 07/26/19  0903 07/24/19  0837   SODIUM mmol/L 142 139 137   POTASSIUM mmol/L 4.7 5.0 4.8*   CHLORIDE mmol/L 105 104 101   CO2 mmol/L 25.3 21.6* 19.7*   BUN mg/dL 26* 39* 34*   CREATININE mg/dL 1.53* 1.84* 2.05*   CALCIUM mg/dL 9.3 8.4* 9.6   ALBUMIN g/dL 4.20 4.10 4.50   BILIRUBIN mg/dL 0.3 0.2 <0.2*   ALK PHOS U/L 172* 125* 123*   ALT (SGPT) U/L 86* 44* 26   AST (SGOT) U/L 34 35 22   GLUCOSE mg/dL 133* 141* 244*             Assessment/Plan    1. Marginal zone lymphoma.  · Peripheral blood flow cytometry CD5/CD10/CD23 negative; positive for p53 and 13 Q deletion, negative for KAREN and trisomy 12.  · Bone marrow evaluation demonstrated involvement by low-grade B-cell lymphoma classified again as marginal zone lymphoma occupying 75% of the total marrow.  · Patient has no B symptoms, no bulky disease.    · Significant leukocytosis with WBC count for example today being 168,000.  · Patient already taking allopurinol 300 mg daily.  · Bendamustine/Rituxan initiated, 7/23/2019.  After receiving only 7 mL's of Rituxan the patient had a reaction, specifically he developed initially feelings of warmth and nausea.  He was given additional 20 mill grams of IV Pepcid.  Patient then began reporting changes in vision as well as diaphoresis, and near syncopal.  He was given additional 2 mg of Solu-Cortef.  The patient was monitored.  We decided not to give further Rituxan that day, but he was brought back on day #2 and was able to complete his Rituxan without difficulty.  · Close follow-up planned to protect against TLS, specifically with return lab appointment on 7/26/2019 for CBC, CMP, LDH, uric acid along with IV fluids.  · He is otherwise scheduled for weekly labs including CBC, CMP, LDH, uric acid and possible IV fluids during the first cycle of therapy.  · Patient otherwise will follow-up with Dr. Herrera in  4 weeks when due for cycle 2 of therapy.  Planning 5-6 cycles of therapy if tolerated.  Thereafter he would be placed on maintenance Rituxan every 3 months for a total of 2 years.  · Patient returns 7/29/2019 for reevaluation after his first cycle.  He has done reasonably well.  WBC today is 65.32, creatinine 1.53, uric acid 6.8.  I reviewed his labs with Dr. Herrera.  The patient does not need IV fluids today.  I have encouraged him to continue oral hydration as well as continue allopurinol.  Patient will return in 1 week for reevaluation repeat labs, for possible fluids.    2.  CKD, followed by PCP Dr. Dwyer.  Patient has had a creatinine around 1.6-1.8 for the last 3 years at least per review of records.  Creatinine 1.53 today.  Continue to closely monitor.    3.  Abdominal otic aneurysm, followed by Dr. 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  Continue to monitor.    PLAN:  1. Continue allopurinol 300 mg daily.    2. Continue oral hydration with water at home.  3. Continue acyclovir 400 mg daily.    4. Return weekly for CBC, CMP, LDH, uric acid with RN review for possible IV fluids.  5. Return in 3 weeks for follow-up visit with Dr. Herrera for reevaluation prior to cycle 2 therapy bendamustine/Rituxan.  6. Plan total of 5-6 cycles of bendamustine/Rituxan, thereafter consider maintenance Rituxan every 3 months for a total 2 years..              Cc:  Fide Parekh MD

## 2019-07-30 ENCOUNTER — APPOINTMENT (OUTPATIENT)
Dept: ONCOLOGY | Facility: HOSPITAL | Age: 79
End: 2019-07-30

## 2019-07-30 ENCOUNTER — APPOINTMENT (OUTPATIENT)
Dept: LAB | Facility: HOSPITAL | Age: 79
End: 2019-07-30

## 2019-08-05 RX ORDER — AMLODIPINE BESYLATE 10 MG/1
TABLET ORAL
Qty: 90 TABLET | Refills: 0 | Status: SHIPPED | OUTPATIENT
Start: 2019-08-05 | End: 2019-11-08 | Stop reason: SDUPTHER

## 2019-08-06 ENCOUNTER — INFUSION (OUTPATIENT)
Dept: ONCOLOGY | Facility: HOSPITAL | Age: 79
End: 2019-08-06

## 2019-08-06 ENCOUNTER — LAB (OUTPATIENT)
Dept: LAB | Facility: HOSPITAL | Age: 79
End: 2019-08-06

## 2019-08-06 VITALS
WEIGHT: 229.8 LBS | HEART RATE: 72 BPM | BODY MASS INDEX: 32.35 KG/M2 | DIASTOLIC BLOOD PRESSURE: 72 MMHG | SYSTOLIC BLOOD PRESSURE: 173 MMHG | OXYGEN SATURATION: 96 % | TEMPERATURE: 97.5 F

## 2019-08-06 DIAGNOSIS — C83.08 SMALL B-CELL LYMPHOMA OF LYMPH NODES OF MULTIPLE REGIONS (HCC): ICD-10-CM

## 2019-08-06 LAB
ALBUMIN SERPL-MCNC: 4.2 G/DL (ref 3.5–5.2)
ALBUMIN/GLOB SERPL: 1.6 G/DL
ALP SERPL-CCNC: 153 U/L (ref 39–117)
ALT SERPL W P-5'-P-CCNC: 39 U/L (ref 1–41)
ANION GAP SERPL CALCULATED.3IONS-SCNC: 13.6 MMOL/L (ref 5–15)
AST SERPL-CCNC: 28 U/L (ref 1–40)
BASOPHILS # BLD AUTO: 0.12 10*3/MM3 (ref 0–0.2)
BASOPHILS NFR BLD AUTO: 0.2 % (ref 0–1.5)
BILIRUB SERPL-MCNC: 0.3 MG/DL (ref 0.2–1.2)
BUN BLD-MCNC: 22 MG/DL (ref 8–23)
BUN/CREAT SERPL: 14 (ref 7–25)
CALCIUM SPEC-SCNC: 9.2 MG/DL (ref 8.6–10.5)
CHLORIDE SERPL-SCNC: 98 MMOL/L (ref 98–107)
CO2 SERPL-SCNC: 22.4 MMOL/L (ref 22–29)
CREAT BLD-MCNC: 1.57 MG/DL (ref 0.76–1.27)
DEPRECATED RDW RBC AUTO: 50.5 FL (ref 37–54)
EOSINOPHIL # BLD AUTO: 0.45 10*3/MM3 (ref 0–0.4)
EOSINOPHIL NFR BLD AUTO: 0.8 % (ref 0.3–6.2)
ERYTHROCYTE [DISTWIDTH] IN BLOOD BY AUTOMATED COUNT: 14.6 % (ref 12.3–15.4)
GFR SERPL CREATININE-BSD FRML MDRD: 43 ML/MIN/1.73
GLOBULIN UR ELPH-MCNC: 2.7 GM/DL
GLUCOSE BLD-MCNC: 162 MG/DL (ref 65–99)
HCT VFR BLD AUTO: 38 % (ref 37.5–51)
HGB BLD-MCNC: 12.3 G/DL (ref 13–17.7)
IMM GRANULOCYTES # BLD AUTO: 0.07 10*3/MM3 (ref 0–0.05)
IMM GRANULOCYTES NFR BLD AUTO: 0.1 % (ref 0–0.5)
LDH SERPL-CCNC: 211 U/L (ref 135–225)
LYMPHOCYTES # BLD AUTO: 46.73 10*3/MM3 (ref 0.7–3.1)
LYMPHOCYTES NFR BLD AUTO: 83.3 % (ref 19.6–45.3)
MCH RBC QN AUTO: 30.4 PG (ref 26.6–33)
MCHC RBC AUTO-ENTMCNC: 32.4 G/DL (ref 31.5–35.7)
MCV RBC AUTO: 93.8 FL (ref 79–97)
MONOCYTES # BLD AUTO: 3.19 10*3/MM3 (ref 0.1–0.9)
MONOCYTES NFR BLD AUTO: 5.7 % (ref 5–12)
NEUTROPHILS # BLD AUTO: 5.56 10*3/MM3 (ref 1.7–7)
NEUTROPHILS NFR BLD AUTO: 9.9 % (ref 42.7–76)
PLATELET # BLD AUTO: 146 10*3/MM3 (ref 140–450)
PMV BLD AUTO: 10 FL (ref 6–12)
POTASSIUM BLD-SCNC: 4.5 MMOL/L (ref 3.5–5.2)
PROT SERPL-MCNC: 6.9 G/DL (ref 6–8.5)
RBC # BLD AUTO: 4.05 10*6/MM3 (ref 4.14–5.8)
SODIUM BLD-SCNC: 134 MMOL/L (ref 136–145)
URATE SERPL-MCNC: 5.6 MG/DL (ref 3.4–7)
WBC NRBC COR # BLD: 56.12 10*3/MM3 (ref 3.4–10.8)

## 2019-08-06 PROCEDURE — 80053 COMPREHEN METABOLIC PANEL: CPT

## 2019-08-06 PROCEDURE — 83615 LACTATE (LD) (LDH) ENZYME: CPT

## 2019-08-06 PROCEDURE — 84550 ASSAY OF BLOOD/URIC ACID: CPT

## 2019-08-06 PROCEDURE — 36415 COLL VENOUS BLD VENIPUNCTURE: CPT

## 2019-08-06 PROCEDURE — 85025 COMPLETE CBC W/AUTO DIFF WBC: CPT

## 2019-08-06 NOTE — PROGRESS NOTES
CBC r/w pt and spouse. Values are stable to improved with wbc count of 56.12; creatinine 1.57; and sodium 134. The pt explained that he is feeling well and has no complaints. He mowed the grass two days ago and played golf yesterday. He said that he has been drinking plenty of fluids and continues to take the allopurinol. CBC and CMP were reviewed as well with Dr. Horne due to slightly low sodium of 134. Dr. Horne said that IVFs can be given but oral hydration is a possibility as well. The pt and wife was informed; the pt agreed to increase his oral intake moreso over the next few days. He was encouraged to be more cognizant of increased oral intake if and when he is in the sun and outside in the near future. The pt was also reminded to call our office for any questions or concerns. The pt and spouse v/u.

## 2019-08-13 ENCOUNTER — INFUSION (OUTPATIENT)
Dept: ONCOLOGY | Facility: HOSPITAL | Age: 79
End: 2019-08-13

## 2019-08-13 ENCOUNTER — LAB (OUTPATIENT)
Dept: LAB | Facility: HOSPITAL | Age: 79
End: 2019-08-13

## 2019-08-13 VITALS
HEART RATE: 59 BPM | TEMPERATURE: 98.3 F | OXYGEN SATURATION: 96 % | SYSTOLIC BLOOD PRESSURE: 137 MMHG | DIASTOLIC BLOOD PRESSURE: 71 MMHG

## 2019-08-13 DIAGNOSIS — C83.08 SMALL B-CELL LYMPHOMA OF LYMPH NODES OF MULTIPLE REGIONS (HCC): ICD-10-CM

## 2019-08-13 LAB
ALBUMIN SERPL-MCNC: 4.2 G/DL (ref 3.5–5.2)
ALBUMIN/GLOB SERPL: 1.4 G/DL
ALP SERPL-CCNC: 119 U/L (ref 39–117)
ALT SERPL W P-5'-P-CCNC: 17 U/L (ref 1–41)
ANION GAP SERPL CALCULATED.3IONS-SCNC: 14.5 MMOL/L (ref 5–15)
AST SERPL-CCNC: 17 U/L (ref 1–40)
BASOPHILS # BLD AUTO: 0.14 10*3/MM3 (ref 0–0.2)
BASOPHILS NFR BLD AUTO: 0.2 % (ref 0–1.5)
BILIRUB SERPL-MCNC: 0.4 MG/DL (ref 0.2–1.2)
BUN BLD-MCNC: 24 MG/DL (ref 8–23)
BUN/CREAT SERPL: 13.5 (ref 7–25)
CALCIUM SPEC-SCNC: 9.4 MG/DL (ref 8.6–10.5)
CHLORIDE SERPL-SCNC: 101 MMOL/L (ref 98–107)
CO2 SERPL-SCNC: 22.5 MMOL/L (ref 22–29)
CREAT BLD-MCNC: 1.78 MG/DL (ref 0.76–1.27)
DEPRECATED RDW RBC AUTO: 49.7 FL (ref 37–54)
EOSINOPHIL # BLD AUTO: 0.68 10*3/MM3 (ref 0–0.4)
EOSINOPHIL NFR BLD AUTO: 1.1 % (ref 0.3–6.2)
ERYTHROCYTE [DISTWIDTH] IN BLOOD BY AUTOMATED COUNT: 14.4 % (ref 12.3–15.4)
GFR SERPL CREATININE-BSD FRML MDRD: 37 ML/MIN/1.73
GLOBULIN UR ELPH-MCNC: 3 GM/DL
GLUCOSE BLD-MCNC: 148 MG/DL (ref 65–99)
HCT VFR BLD AUTO: 36.7 % (ref 37.5–51)
HGB BLD-MCNC: 11.9 G/DL (ref 13–17.7)
IMM GRANULOCYTES # BLD AUTO: 0.05 10*3/MM3 (ref 0–0.05)
IMM GRANULOCYTES NFR BLD AUTO: 0.1 % (ref 0–0.5)
LDH SERPL-CCNC: 195 U/L (ref 135–225)
LYMPHOCYTES # BLD AUTO: 48.1 10*3/MM3 (ref 0.7–3.1)
LYMPHOCYTES NFR BLD AUTO: 81 % (ref 19.6–45.3)
MCH RBC QN AUTO: 30.3 PG (ref 26.6–33)
MCHC RBC AUTO-ENTMCNC: 32.4 G/DL (ref 31.5–35.7)
MCV RBC AUTO: 93.4 FL (ref 79–97)
MONOCYTES # BLD AUTO: 5.59 10*3/MM3 (ref 0.1–0.9)
MONOCYTES NFR BLD AUTO: 9.4 % (ref 5–12)
NEUTROPHILS # BLD AUTO: 4.85 10*3/MM3 (ref 1.7–7)
NEUTROPHILS NFR BLD AUTO: 8.2 % (ref 42.7–76)
PLATELET # BLD AUTO: 142 10*3/MM3 (ref 140–450)
PMV BLD AUTO: 9.8 FL (ref 6–12)
POTASSIUM BLD-SCNC: 5 MMOL/L (ref 3.5–5.2)
PROT SERPL-MCNC: 7.2 G/DL (ref 6–8.5)
RBC # BLD AUTO: 3.93 10*6/MM3 (ref 4.14–5.8)
SODIUM BLD-SCNC: 138 MMOL/L (ref 136–145)
URATE SERPL-MCNC: 4.9 MG/DL (ref 3.4–7)
WBC NRBC COR # BLD: 59.41 10*3/MM3 (ref 3.4–10.8)

## 2019-08-13 PROCEDURE — 84550 ASSAY OF BLOOD/URIC ACID: CPT

## 2019-08-13 PROCEDURE — 80053 COMPREHEN METABOLIC PANEL: CPT

## 2019-08-13 PROCEDURE — 85025 COMPLETE CBC W/AUTO DIFF WBC: CPT

## 2019-08-13 PROCEDURE — 36415 COLL VENOUS BLD VENIPUNCTURE: CPT

## 2019-08-13 PROCEDURE — 83615 LACTATE (LD) (LDH) ENZYME: CPT

## 2019-08-13 NOTE — PROGRESS NOTES
Lab results reviewed with patient and wife.  Creatinine (1.74) and BUN (24) slightly increased today from previous result.   Pt reports playing golf  yesterday and did not drink much water.  Pt wife reports patient not feeling good this weekend with much of the time sleeping in bed and he did not drink much water.  She stated he had some chills but did not take his temperature.  Pt denies chills, sore throat or any other signs of infection at this time.  He stated he feels good today.  Pt has petechiae to both lower extremities that pt reports appeared about a week or so ago and looks about the same to him and his wife.  Pt instructed to increase his water intake and to call for any questions/issues.  Pt/wife V/U. He is scheduled to see Dr Herrera and treatment next week.

## 2019-08-14 DIAGNOSIS — C83.08 SMALL B-CELL LYMPHOMA OF LYMPH NODES OF MULTIPLE REGIONS (HCC): ICD-10-CM

## 2019-08-20 ENCOUNTER — LAB (OUTPATIENT)
Dept: LAB | Facility: HOSPITAL | Age: 79
End: 2019-08-20

## 2019-08-20 ENCOUNTER — INFUSION (OUTPATIENT)
Dept: ONCOLOGY | Facility: HOSPITAL | Age: 79
End: 2019-08-20

## 2019-08-20 ENCOUNTER — OFFICE VISIT (OUTPATIENT)
Dept: ONCOLOGY | Facility: CLINIC | Age: 79
End: 2019-08-20

## 2019-08-20 VITALS
HEART RATE: 73 BPM | OXYGEN SATURATION: 94 % | HEIGHT: 71 IN | TEMPERATURE: 98.8 F | SYSTOLIC BLOOD PRESSURE: 126 MMHG | DIASTOLIC BLOOD PRESSURE: 65 MMHG | RESPIRATION RATE: 16 BRPM | WEIGHT: 226.5 LBS | BODY MASS INDEX: 31.71 KG/M2

## 2019-08-20 DIAGNOSIS — C83.08 SMALL B-CELL LYMPHOMA OF LYMPH NODES OF MULTIPLE REGIONS (HCC): ICD-10-CM

## 2019-08-20 DIAGNOSIS — C83.08 SMALL B-CELL LYMPHOMA OF LYMPH NODES OF MULTIPLE REGIONS (HCC): Primary | ICD-10-CM

## 2019-08-20 LAB
ALBUMIN SERPL-MCNC: 3.9 G/DL (ref 3.5–5.2)
ALBUMIN/GLOB SERPL: 1.4 G/DL (ref 1.1–2.4)
ALP SERPL-CCNC: 93 U/L (ref 38–116)
ALT SERPL W P-5'-P-CCNC: 32 U/L (ref 0–41)
ANION GAP SERPL CALCULATED.3IONS-SCNC: 14.2 MMOL/L (ref 5–15)
AST SERPL-CCNC: 27 U/L (ref 0–40)
BASOPHILS # BLD AUTO: 0.25 10*3/MM3 (ref 0–0.2)
BASOPHILS NFR BLD AUTO: 0.4 % (ref 0–1.5)
BILIRUB SERPL-MCNC: 0.3 MG/DL (ref 0.2–1.2)
BUN BLD-MCNC: 24 MG/DL (ref 6–20)
BUN/CREAT SERPL: 12.6 (ref 7.3–30)
CALCIUM SPEC-SCNC: 9.3 MG/DL (ref 8.5–10.2)
CHLORIDE SERPL-SCNC: 98 MMOL/L (ref 98–107)
CO2 SERPL-SCNC: 21.8 MMOL/L (ref 22–29)
CREAT BLD-MCNC: 1.9 MG/DL (ref 0.7–1.3)
DEPRECATED RDW RBC AUTO: 46.9 FL (ref 37–54)
EOSINOPHIL # BLD AUTO: 0.99 10*3/MM3 (ref 0–0.4)
EOSINOPHIL NFR BLD AUTO: 1.7 % (ref 0.3–6.2)
ERYTHROCYTE [DISTWIDTH] IN BLOOD BY AUTOMATED COUNT: 13.9 % (ref 12.3–15.4)
GFR SERPL CREATININE-BSD FRML MDRD: 35 ML/MIN/1.73
GLOBULIN UR ELPH-MCNC: 2.8 GM/DL (ref 1.8–3.5)
GLUCOSE BLD-MCNC: 142 MG/DL (ref 74–124)
HCT VFR BLD AUTO: 33.8 % (ref 37.5–51)
HGB BLD-MCNC: 11.3 G/DL (ref 13–17.7)
IMM GRANULOCYTES # BLD AUTO: 0.25 10*3/MM3 (ref 0–0.05)
IMM GRANULOCYTES NFR BLD AUTO: 0.4 % (ref 0–0.5)
LDH SERPL-CCNC: 210 U/L (ref 99–259)
LYMPHOCYTES # BLD AUTO: 39.11 10*3/MM3 (ref 0.7–3.1)
LYMPHOCYTES NFR BLD AUTO: 69 % (ref 19.6–45.3)
MCH RBC QN AUTO: 30.8 PG (ref 26.6–33)
MCHC RBC AUTO-ENTMCNC: 33.4 G/DL (ref 31.5–35.7)
MCV RBC AUTO: 92.1 FL (ref 79–97)
MONOCYTES # BLD AUTO: 10.2 10*3/MM3 (ref 0.1–0.9)
MONOCYTES NFR BLD AUTO: 18 % (ref 5–12)
NEUTROPHILS # BLD AUTO: 5.89 10*3/MM3 (ref 1.7–7)
NEUTROPHILS NFR BLD AUTO: 10.5 % (ref 42.7–76)
NRBC BLD AUTO-RTO: 0 /100 WBC (ref 0–0.2)
PHOSPHATE SERPL-MCNC: 3 MG/DL (ref 2.5–4.5)
PLATELET # BLD AUTO: 145 10*3/MM3 (ref 140–450)
PMV BLD AUTO: 10.5 FL (ref 6–12)
POTASSIUM BLD-SCNC: 4.3 MMOL/L (ref 3.5–4.7)
PROT SERPL-MCNC: 6.7 G/DL (ref 6.3–8)
RBC # BLD AUTO: 3.67 10*6/MM3 (ref 4.14–5.8)
SODIUM BLD-SCNC: 134 MMOL/L (ref 134–145)
URATE SERPL-MCNC: 5 MG/DL (ref 2.8–7.4)
WBC NRBC COR # BLD: 56.69 10*3/MM3 (ref 3.4–10.8)

## 2019-08-20 PROCEDURE — 84550 ASSAY OF BLOOD/URIC ACID: CPT | Performed by: INTERNAL MEDICINE

## 2019-08-20 PROCEDURE — 96361 HYDRATE IV INFUSION ADD-ON: CPT | Performed by: INTERNAL MEDICINE

## 2019-08-20 PROCEDURE — 96374 THER/PROPH/DIAG INJ IV PUSH: CPT | Performed by: INTERNAL MEDICINE

## 2019-08-20 PROCEDURE — 85025 COMPLETE CBC W/AUTO DIFF WBC: CPT

## 2019-08-20 PROCEDURE — 36415 COLL VENOUS BLD VENIPUNCTURE: CPT

## 2019-08-20 PROCEDURE — 80053 COMPREHEN METABOLIC PANEL: CPT | Performed by: INTERNAL MEDICINE

## 2019-08-20 PROCEDURE — 83615 LACTATE (LD) (LDH) ENZYME: CPT | Performed by: INTERNAL MEDICINE

## 2019-08-20 PROCEDURE — 84100 ASSAY OF PHOSPHORUS: CPT | Performed by: INTERNAL MEDICINE

## 2019-08-20 PROCEDURE — 25010000002 ONDANSETRON PER 1 MG: Performed by: INTERNAL MEDICINE

## 2019-08-20 PROCEDURE — 99215 OFFICE O/P EST HI 40 MIN: CPT | Performed by: INTERNAL MEDICINE

## 2019-08-20 RX ORDER — SODIUM CHLORIDE 9 MG/ML
500 INJECTION, SOLUTION INTRAVENOUS ONCE
Status: COMPLETED | OUTPATIENT
Start: 2019-08-20 | End: 2019-08-20

## 2019-08-20 RX ADMIN — SODIUM CHLORIDE 500 ML: 900 INJECTION, SOLUTION INTRAVENOUS at 10:07

## 2019-08-20 RX ADMIN — SODIUM CHLORIDE 500 ML: 900 INJECTION, SOLUTION INTRAVENOUS at 09:21

## 2019-08-20 RX ADMIN — ONDANSETRON 8 MG: 2 INJECTION, SOLUTION INTRAMUSCULAR; INTRAVENOUS at 10:21

## 2019-08-20 NOTE — PROGRESS NOTES
Subjective .     REASONS FOR FOLLOWUP: Marginal zone lymphoma    HISTORY OF PRESENT ILLNESS:  The patient is a 78 y.o. year old male who is here for follow-up with the above-mentioned history.    History of Present Illness This patient returns today to the office for followup in company of his wife stating that for the last week he has been having nausea almost on a regular basis, he is not drinking but eating too much and he has lost 10 pounds of weight. He had constipation, he took Dulcolax, this gave him diarrhea and put him more into the dehydration situation. He has fatigue. He has not had any fevers, chills, no vomiting, no cramping or abdominal distention. No passage of blood in the stool. Urination in volume seems to be normal. He has developed a papular non pruriginous rash in his lower extremities that he wants for me to review. He has not had any cough or sputum production. He has not had any shortness of breath. He has not taken his nausea medication like he was instructed and this will be discussed below.     ·         Past Medical History:   Diagnosis Date   • Abnormal ECG    • BPH (benign prostatic hyperplasia)    • Chronic kidney disease    • Disease of thyroid gland    • Hyperlipidemia    • Hyperparathyroidism (CMS/HCC) 2017    parathyroidectomy   • Hypertension    • Nicotine dependence    • TB (pulmonary tuberculosis)        ONCOLOGIC HISTORY:  (History from previous dates can be found in the separate document.)    Current Outpatient Medications on File Prior to Visit   Medication Sig Dispense Refill   • acyclovir (ZOVIRAX) 400 MG tablet Take 1 tablet by mouth Daily. 60 tablet 5   • allopurinol (ZYLOPRIM) 300 MG tablet TAKE 1 TABLET BY MOUTH DAILY 90 tablet 1   • amLODIPine (NORVASC) 10 MG tablet TAKE 1 TABLET BY MOUTH EVERY DAY 90 tablet 0   • Ascorbic Acid (VITAMIN C PO) Take  by mouth.     • aspirin 81 MG tablet Take 81 mg by mouth.     • furosemide (LASIX) 20 MG tablet Take 20 mg by mouth  Daily.     • HYDRALAZINE HCL PO Take 25 mg by mouth Daily.     • losartan (COZAAR) 100 MG tablet Take 100 mg by mouth Daily.     • METOPROLOL TARTRATE PO Take 25 mg by mouth Daily.     • Multiple Vitamins-Minerals (MULTIVITAL-M) tablet Take  by mouth Daily.     • Omega-3 Fatty Acids (FISH OIL) 1000 MG capsule capsule Take 1,000 mg by mouth Daily With Breakfast.     • ondansetron (ZOFRAN) 8 MG tablet Take 1 tablet by mouth 3 (Three) Times a Day As Needed for Nausea or Vomiting. 30 tablet 5   • VITAMIN E PO Take  by mouth.       No current facility-administered medications on file prior to visit.        ALLERGIES:     Allergies   Allergen Reactions   • Codeine Unknown (See Comments)     UNKNOWN         Social History     Socioeconomic History   • Marital status:      Spouse name: Not on file   • Number of children: 2   • Years of education: High school   • Highest education level: High school graduate   Occupational History     Employer: RETIRED   Social Needs   • Financial resource strain: Not hard at all   • Food insecurity:     Worry: Never true     Inability: Never true   • Transportation needs:     Medical: No     Non-medical: Not on file   Tobacco Use   • Smoking status: Current Every Day Smoker     Packs/day: 0.50     Years: 60.00     Pack years: 30.00     Types: Cigarettes   • Smokeless tobacco: Never Used   Substance and Sexual Activity   • Alcohol use: Yes     Alcohol/week: 1.2 oz     Types: 2 Cans of beer per week     Frequency: 2-4 times a month     Drinks per session: 1 or 2     Binge frequency: Never     Comment: 2-3 beers 1-2 days a week   • Drug use: No     Comment: 2 cups coffee daily   • Sexual activity: Defer     Partners: Female         Cancer-related family history includes Cancer in his father.     Review of Systems:      General: no fever, no chills,  fatigue, weight changes,  lack of appetite.  Eyes: no epiphora, xerophthalmia,conjunctivitis, pain, glaucoma, blurred vision, blindness,  "secretion, photophobia, proptosis, diplopia.  Ears: no otorrhea, tinnitus, otorrhagia, deafness, pain, vertigo.  Nose: no rhinorrhea, no epistaxis, no alteration in perception of odors, no sinuses pressure.  Mouth: no alteration in gums or teeth,  No ulcers, no difficulty with mastication or deglut ion, no odynophagia.  Neck: no masses or pain, no thyroid alterations, no pain in muscles or arteries, no carotid odynia, no crepitation.  Respiratory: no cough, no sputum production,no dyspnea,no trepopnea, no pleuritic pain,no hemoptysis.  Heart: no syncope, no irregularity, no palpitations, no angina,no orthopnea,no paroxysmal nocturnal dyspnea.  Vascular Venous: no tenderness,no edema,no palpable cords,no postphlebitic syndrome, no skin changes no ulcerations.  Vascular Arterial: no distal ischemia, noclaudication, no gangrene, no neuropathic ischemic pain, no skin ulcers, no paleness no cyanosis.  GI: no dysphagia, no odynophagia, no regurgitation, no heartburn,no indigestion, nausea,no vomiting,no hematemesis ,no melena,no jaundice,no distention,  obstipation,no enterorrhagia,no proctalgia,no anal  lesions,  changes in bowel habits.  : no frequency, no hesitancy, no hematuria, no discharge,no  pain.  Musculoskeletal: no muscle or tendon pain or inflammation,no  joint pain, no edema, no functional limitation,no fasciculations, no mass.  Neurologic: no headache, no seizures, noalterations on Craneal nerves, no motor deficit, no sensory deficit, normal coordination, no alteration in memory,normal orientation, calculation,normal writting, verbal and written language.  Skin: no rashes,no pruritus no localized lesions.  Psychiatric: no anxiety, no depression,no agitation, no delusions, proper insight.        Objective      Vitals:    08/20/19 0838   BP: 126/65   Pulse: 73   Resp: 16   Temp: 98.8 °F (37.1 °C)   TempSrc: Oral   SpO2: 94%   Weight: 103 kg (226 lb 8 oz)   Height: 179.5 cm (70.67\")   PainSc: 2  Comment: " stomach pain     Current Status 8/20/2019   ECOG score 0     Physical Exam:  GENERAL:  Well-developed, well-nourished  Patient  in no acute distress.   SKIN:  Warm, dry ,NO rashes,NO purpura ,NO petechiae.He has a papular rash in the skin of the lower extremities with very dominant lesions that suggest a drug rash. It is only present in both anterior aspects of the legs, is not purpuric and it has no representation in any other part of his body.       HEENT:  Pupils were equal and reactive to light and accomodation, conjunctivas non injected, no pterigion, normal extraocular movements, normal visual acuity.   Mouth mucosa was moist, no exudates in oropharynx, normal gum line, normal roof of the mouth and pillars, normal papillations of the tongue.  NECK:  Supple with good range of motion; no thyromegaly or masses, no JVD or bruits, no cervical adenopathies.No carotid arteries pain, no carotid abnormal pulsation , NO arterial dance.  LYMPHATICS:  No cervical, NO supraclavicular, NO axillary,NO epitrochlear , NO inguinal adenopathy.  CHEST:  Normal excursion of both ephraim thoraces, normal voice fremitus, no subcutaneous emphysema, normal axillas, no rashes or acanthosis nigricans. Lungs clear to percussion and auscultation, normal breath sounds bilaterally, no wheezing,NO crackles NO ronchi, NO stridor, NO rubs.  CARDIAC AND VASCULAR:  normal rate and regular rhythm, without murmurs,NO rubs NO S3 NO S4 right or left . Normal femoral, popliteal, pedis, brachial and carotid pulses.  ABDOMEN:  Soft, nontender with no organomegaly or masses, no ascites, no collateral circulation,no distention,no Tilly sign, no abdominal pain, no inguinal hernias,no umbilical hernia, no abdominal bruits. Normal bowel sounds.  GENITAL: Not  Performed.  EXTREMITIES  AND SPINE:  No clubbing, cyanosis or edema, no deformities or pain .No kyphosis, scoliosis, deformities or pain in spine, ribs or pelvic bone.  NEUROLOGICAL:  Patient was awake,  alert, oriented to time, person and place.            RECENT LABS:  Results from last 7 days   Lab Units 08/20/19  0808 08/13/19  1006   WBC 10*3/mm3 56.69* 59.41*   NEUTROS ABS 10*3/mm3 5.89 4.85   HEMOGLOBIN g/dL 11.3* 11.9*   HEMATOCRIT % 33.8* 36.7*   PLATELETS 10*3/mm3 145 142     Results from last 7 days   Lab Units 08/13/19  1006   SODIUM mmol/L 138   POTASSIUM mmol/L 5.0   CHLORIDE mmol/L 101   CO2 mmol/L 22.5   BUN mg/dL 24*   CREATININE mg/dL 1.78*   CALCIUM mg/dL 9.4   ALBUMIN g/dL 4.20   BILIRUBIN mg/dL 0.4   ALK PHOS U/L 119*   ALT (SGPT) U/L 17   AST (SGOT) U/L 17   GLUCOSE mg/dL 148*         Component      Latest Ref Rng & Units 7/23/2019 7/24/2019 7/26/2019 7/29/2019   WBC      3.40 - 10.80 10*3/mm3 168.20 (HH) 96.92 (H) 89.85 (HH) 65.32 (HH)   RBC      4.14 - 5.80 10*6/mm3 3.97 (L) 3.88 (L) 3.71 (L) 3.79 (L)   Hemoglobin      13.0 - 17.7 g/dL 12.0 (L) 12.0 (L) 11.3 (L) 11.5 (L)   Hematocrit      37.5 - 51.0 % 38.4 37.6 35.2 (L) 35.0 (L)   MCV      79.0 - 97.0 fL 96.7 96.9 94.9 92.3   MCH      26.6 - 33.0 pg 30.2 30.9 30.5 30.3   MCHC      31.5 - 35.7 g/dL 31.3 (L) 31.9 32.1 32.9   RDW      12.3 - 15.4 % 14.1 14.1 14.7 14.4   RDW-SD      37.0 - 54.0 fl 48.8 49.2 50.9 48.4   MPV      6.0 - 12.0 fL 9.5 9.8 9.9 10.4   Platelets      140 - 450 10*3/mm3 151 147 99 (L) 91 (L)   Neutrophil Rel %      42.7 - 76.0 % 2.6 (L) 17.3 (L) 7.9 (L) 7.0 (L)   Lymphocyte Rel %      19.6 - 45.3 % 92.2 (H) 76.2 (H) 85.0 (H) 86.2 (H)   Monocyte Rel %      5.0 - 12.0 % 4.6 (L) 5.4 6.7 5.8   Eosinophil Rel %      0.3 - 6.2 % 0.3 0.0 (L) 0.1 (L) 0.6   Basophil Rel %      0.0 - 1.5 % 0.1 0.1 0.1 0.1   Immature Granulocyte Rel %      0.0 - 0.5 % 0.2 1.0 (H) 0.2 0.3   Neutrophils Absolute      1.70 - 7.00 10*3/mm3 4.49 16.71 (H) 6.99 4.55   Lymphocytes Absolute      0.70 - 3.10 10*3/mm3 155.15 (H) 73.87 (H) 76.41 (H) 56.31 (H)   Monocytes Absolute      0.10 - 0.90 10*3/mm3 7.73 (H) 5.27 (H) 6.06 (H) 3.79 (H)   Eosinophils  Absolute      0.00 - 0.40 10*3/mm3 0.45 (H) 0.01 0.07 0.39   Basophils Absolute      0.00 - 0.20 10*3/mm3 0.09 0.05 0.10 0.07   Immature Grans, Absolute      0.00 - 0.05 10*3/mm3 0.29 (H) 1.01 (H) 0.22 (H) 0.21 (H)   nRBC      0.0 - 0.2 /100 WBC 0.0 0.0  0.0     Component      Latest Ref Rng & Units 8/6/2019 8/13/2019 8/20/2019   WBC      3.40 - 10.80 10*3/mm3 56.12 (HH) 59.41 (HH) 56.69 (H)   RBC      4.14 - 5.80 10*6/mm3 4.05 (L) 3.93 (L) 3.67 (L)   Hemoglobin      13.0 - 17.7 g/dL 12.3 (L) 11.9 (L) 11.3 (L)   Hematocrit      37.5 - 51.0 % 38.0 36.7 (L) 33.8 (L)   MCV      79.0 - 97.0 fL 93.8 93.4 92.1   MCH      26.6 - 33.0 pg 30.4 30.3 30.8   MCHC      31.5 - 35.7 g/dL 32.4 32.4 33.4   RDW      12.3 - 15.4 % 14.6 14.4 13.9   RDW-SD      37.0 - 54.0 fl 50.5 49.7 46.9   MPV      6.0 - 12.0 fL 10.0 9.8 10.5   Platelets      140 - 450 10*3/mm3 146 142 145   Neutrophil Rel %      42.7 - 76.0 % 9.9 (L) 8.2 (L) 10.5 (L)   Lymphocyte Rel %      19.6 - 45.3 % 83.3 (H) 81.0 (H) 69.0 (H)   Monocyte Rel %      5.0 - 12.0 % 5.7 9.4 18.0 (H)   Eosinophil Rel %      0.3 - 6.2 % 0.8 1.1 1.7   Basophil Rel %      0.0 - 1.5 % 0.2 0.2 0.4   Immature Granulocyte Rel %      0.0 - 0.5 % 0.1 0.1 0.4   Neutrophils Absolute      1.70 - 7.00 10*3/mm3 5.56 4.85 5.89   Lymphocytes Absolute      0.70 - 3.10 10*3/mm3 46.73 (H) 48.10 (H) 39.11 (H)   Monocytes Absolute      0.10 - 0.90 10*3/mm3 3.19 (H) 5.59 (H) 10.20 (H)   Eosinophils Absolute      0.00 - 0.40 10*3/mm3 0.45 (H) 0.68 (H) 0.99 (H)   Basophils Absolute      0.00 - 0.20 10*3/mm3 0.12 0.14 0.25 (H)   Immature Grans, Absolute      0.00 - 0.05 10*3/mm3 0.07 (H) 0.05 0.25 (H)   nRBC      0.0 - 0.2 /100 WBC   0.0       Assessment/Plan    1. Marginal zone lymphoma.  · Peripheral blood flow cytometry CD5/CD10/CD23 negative; positive for p53 and 13 Q deletion, negative for KAREN and trisomy 12.  · Bone marrow evaluation demonstrated involvement by low-grade B-cell lymphoma classified again  as marginal zone lymphoma occupying 75% of the total marrow.  · Patient has no B symptoms, no bulky disease.    · Significant leukocytosis with WBC count for example today being 168,000.  · Patient already taking allopurinol 300 mg daily.  · Bendamustine/Rituxan initiated, 7/23/2019.  After receiving only 7 mL's of Rituxan the patient had a reaction, specifically he developed initially feelings of warmth and nausea.  He was given additional 20 mill grams of IV Pepcid.  Patient then began reporting changes in vision as well as diaphoresis, and near syncopal.  He was given additional 2 mg of Solu-Cortef.  The patient was monitored.  We decided not to give further Rituxan that day, but he was brought back on day #2 and was able to complete his Rituxan without difficulty.  · Close follow-up planned to protect against TLS, specifically with return lab appointment on 7/26/2019 for CBC, CMP, LDH, uric acid along with IV fluids.  · He is otherwise scheduled for weekly labs including CBC, CMP, LDH, uric acid and possible IV fluids during the first cycle of therapy.      2.  CKD, followed by PCP Dr. Dwyer.  Patient has had a creatinine around 1.6-1.8 for the last 3 years at least per review of records.  Creatinine 1.53 today.  Continue to closely monitor.    3.  Abdominal otic aneurysm, followed by Dr. 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  Continue to monitor.  5.The patient for the last week has been nauseated in the morning. He is not drinking or eating too much and he has not taken the nausea medicine according to our desire. Obviously this makes him to feel weak, this made him to become constipated then he took Dulcolax. This gave him 5 episodes of diarrhea yesterday and that has turned things into worse situation.    RECOMMENDATIONS:  1. I have advised the patient to proceed with IV fluids today normal saline and he is going to have a stat CMP, LDH,  uric acid. I am not going to give him any treatment until his electrolytes and all of the issues pertinent to his chronic kidney disease, volume status are under control.   2. I instructed in regard to nausea medicine with ondansetron. He is supposed to have one of these medicines every day in the morning and another one in the afternoon or depending on needs. He can take up to 3 of them a day. He is not doing that and I instructed for him to do that.  3. The patient will have his treatment postponed until volume status is appropriate, kidney function is better and until his nausea is under better control. We need to be sure that his diarrhea is also gone.     I discussed all of these facts with the patient and his wife.    4. I do believe that the rash that he has in the lower extremities is probably related to allopurinol. I decreased the dose of this medicine to 150 mg dose a day for 3 more weeks and hopefully this will make the rash to not get any worse.     He can applied topical hydrocortisone on his lower extremities if any pruritus.     5. Depending on the measurement of the CMP, LDH and uric acid we will need to have further discussion about how to proceed and whether to give him treatment.     By the end of the day his absolute lymphocyte count that started out at 150,000 and has come down to 39,000 today. The hemoglobin and platelet count are stable therefore I think the patient is on the right track in regard to recovery from his marginal zone non-Hodgkin's lymphoma.         I discussed with nurses the fact that the patient’s BUN and creatinine were elevated today and implying volume contraction. For this reason, we advised the patient to receive IV fluids in the office and thereafter we will review him back next week by nurse practitioner, repeat a stat CMP and if the creatinine is better and the patient feels better, he will proceed with his next cycle of bendamustine and Rituxan.     This was  discussed with nurse in detail .                    Cc:  Fide Parekh MD

## 2019-08-20 NOTE — PROGRESS NOTES
STAT CMP R/W DR. HOPSON. PER DR. HOPSON NO TREATMENT TODAY. PT TO GET 1 L NS AND ZOFRAN 8MG IVPB ONLY TODAY. PT TO RTN THURS FOR CBC, STAT CMP AND POSS TREATMENT. APPT DESK MESSAGED TO SET UP APPT THURS AND FRI (D2). LAB ORDERS PLACED. PT MADE AWARE AND V/U.

## 2019-08-21 ENCOUNTER — APPOINTMENT (OUTPATIENT)
Dept: ONCOLOGY | Facility: HOSPITAL | Age: 79
End: 2019-08-21

## 2019-08-21 RX ORDER — ALLOPURINOL 300 MG/1
TABLET ORAL
Qty: 45 TABLET | Refills: 0 | OUTPATIENT
Start: 2019-08-21

## 2019-08-21 RX ORDER — ALLOPURINOL 300 MG/1
150 TABLET ORAL DAILY
Qty: 15 TABLET | Refills: 0 | Status: SHIPPED | OUTPATIENT
Start: 2019-08-21 | End: 2020-08-07 | Stop reason: SDUPTHER

## 2019-08-22 ENCOUNTER — APPOINTMENT (OUTPATIENT)
Dept: LAB | Facility: HOSPITAL | Age: 79
End: 2019-08-22

## 2019-08-22 ENCOUNTER — APPOINTMENT (OUTPATIENT)
Dept: ONCOLOGY | Facility: HOSPITAL | Age: 79
End: 2019-08-22

## 2019-08-23 ENCOUNTER — APPOINTMENT (OUTPATIENT)
Dept: ONCOLOGY | Facility: HOSPITAL | Age: 79
End: 2019-08-23

## 2019-08-23 DIAGNOSIS — C83.08 SMALL B-CELL LYMPHOMA OF LYMPH NODES OF MULTIPLE REGIONS (HCC): Primary | ICD-10-CM

## 2019-08-26 ENCOUNTER — INFUSION (OUTPATIENT)
Dept: ONCOLOGY | Facility: HOSPITAL | Age: 79
End: 2019-08-26

## 2019-08-26 ENCOUNTER — OFFICE VISIT (OUTPATIENT)
Dept: ONCOLOGY | Facility: CLINIC | Age: 79
End: 2019-08-26

## 2019-08-26 ENCOUNTER — LAB (OUTPATIENT)
Dept: LAB | Facility: HOSPITAL | Age: 79
End: 2019-08-26

## 2019-08-26 VITALS — SYSTOLIC BLOOD PRESSURE: 152 MMHG | DIASTOLIC BLOOD PRESSURE: 92 MMHG | HEART RATE: 65 BPM

## 2019-08-26 VITALS
HEIGHT: 71 IN | HEART RATE: 64 BPM | RESPIRATION RATE: 18 BRPM | WEIGHT: 224.8 LBS | SYSTOLIC BLOOD PRESSURE: 140 MMHG | TEMPERATURE: 97.4 F | OXYGEN SATURATION: 97 % | BODY MASS INDEX: 31.47 KG/M2 | DIASTOLIC BLOOD PRESSURE: 76 MMHG

## 2019-08-26 DIAGNOSIS — C83.08 SMALL B-CELL LYMPHOMA OF LYMPH NODES OF MULTIPLE REGIONS (HCC): Primary | ICD-10-CM

## 2019-08-26 DIAGNOSIS — C83.08 SMALL B-CELL LYMPHOMA OF LYMPH NODES OF MULTIPLE REGIONS (HCC): ICD-10-CM

## 2019-08-26 LAB
ALBUMIN SERPL-MCNC: 3.8 G/DL (ref 3.5–5.2)
ALBUMIN/GLOB SERPL: 1.3 G/DL (ref 1.1–2.4)
ALP SERPL-CCNC: 113 U/L (ref 38–116)
ALT SERPL W P-5'-P-CCNC: 39 U/L (ref 0–41)
ANION GAP SERPL CALCULATED.3IONS-SCNC: 14.6 MMOL/L (ref 5–15)
AST SERPL-CCNC: 36 U/L (ref 0–40)
BASOPHILS # BLD AUTO: 0.11 10*3/MM3 (ref 0–0.2)
BASOPHILS NFR BLD AUTO: 0.2 % (ref 0–1.5)
BILIRUB SERPL-MCNC: 0.2 MG/DL (ref 0.2–1.2)
BUN BLD-MCNC: 21 MG/DL (ref 6–20)
BUN/CREAT SERPL: 12.9 (ref 7.3–30)
CALCIUM SPEC-SCNC: 9.3 MG/DL (ref 8.5–10.2)
CHLORIDE SERPL-SCNC: 100 MMOL/L (ref 98–107)
CO2 SERPL-SCNC: 21.4 MMOL/L (ref 22–29)
CREAT BLD-MCNC: 1.63 MG/DL (ref 0.7–1.3)
DEPRECATED RDW RBC AUTO: 46.1 FL (ref 37–54)
EOSINOPHIL # BLD AUTO: 1.44 10*3/MM3 (ref 0–0.4)
EOSINOPHIL NFR BLD AUTO: 2.1 % (ref 0.3–6.2)
ERYTHROCYTE [DISTWIDTH] IN BLOOD BY AUTOMATED COUNT: 14.2 % (ref 12.3–15.4)
GFR SERPL CREATININE-BSD FRML MDRD: 41 ML/MIN/1.73
GLOBULIN UR ELPH-MCNC: 3 GM/DL (ref 1.8–3.5)
GLUCOSE BLD-MCNC: 118 MG/DL (ref 74–124)
HCT VFR BLD AUTO: 33.4 % (ref 37.5–51)
HGB BLD-MCNC: 11.3 G/DL (ref 13–17.7)
IMM GRANULOCYTES # BLD AUTO: 0.24 10*3/MM3 (ref 0–0.05)
IMM GRANULOCYTES NFR BLD AUTO: 0.4 % (ref 0–0.5)
LDH SERPL-CCNC: 241 U/L (ref 99–259)
LYMPHOCYTES # BLD AUTO: 48.32 10*3/MM3 (ref 0.7–3.1)
LYMPHOCYTES NFR BLD AUTO: 71.9 % (ref 19.6–45.3)
MCH RBC QN AUTO: 30.6 PG (ref 26.6–33)
MCHC RBC AUTO-ENTMCNC: 33.8 G/DL (ref 31.5–35.7)
MCV RBC AUTO: 90.5 FL (ref 79–97)
MONOCYTES # BLD AUTO: 12.2 10*3/MM3 (ref 0.1–0.9)
MONOCYTES NFR BLD AUTO: 18.2 % (ref 5–12)
NEUTROPHILS # BLD AUTO: 4.89 10*3/MM3 (ref 1.7–7)
NEUTROPHILS NFR BLD AUTO: 7.2 % (ref 42.7–76)
NRBC BLD AUTO-RTO: 0 /100 WBC (ref 0–0.2)
PLATELET # BLD AUTO: 238 10*3/MM3 (ref 140–450)
PMV BLD AUTO: 9.7 FL (ref 6–12)
POTASSIUM BLD-SCNC: 4.5 MMOL/L (ref 3.5–4.7)
PROT SERPL-MCNC: 6.8 G/DL (ref 6.3–8)
RBC # BLD AUTO: 3.69 10*6/MM3 (ref 4.14–5.8)
SODIUM BLD-SCNC: 136 MMOL/L (ref 134–145)
URATE SERPL-MCNC: 5.2 MG/DL (ref 2.8–7.4)
WBC NRBC COR # BLD: 67.2 10*3/MM3 (ref 3.4–10.8)

## 2019-08-26 PROCEDURE — 96411 CHEMO IV PUSH ADDL DRUG: CPT | Performed by: NURSE PRACTITIONER

## 2019-08-26 PROCEDURE — 25010000002 RITUXIMAB 10 MG/ML SOLUTION 10 ML VIAL: Performed by: NURSE PRACTITIONER

## 2019-08-26 PROCEDURE — 25010000002 BENDAMUSTINE HCL 100 MG/4ML SOLUTION 4 ML VIAL: Performed by: NURSE PRACTITIONER

## 2019-08-26 PROCEDURE — 96413 CHEMO IV INFUSION 1 HR: CPT | Performed by: NURSE PRACTITIONER

## 2019-08-26 PROCEDURE — 63710000001 PROCHLORPERAZINE MALEATE PER 5 MG: Performed by: NURSE PRACTITIONER

## 2019-08-26 PROCEDURE — 25010000002 DIPHENHYDRAMINE PER 50 MG: Performed by: NURSE PRACTITIONER

## 2019-08-26 PROCEDURE — 85025 COMPLETE CBC W/AUTO DIFF WBC: CPT

## 2019-08-26 PROCEDURE — 96415 CHEMO IV INFUSION ADDL HR: CPT | Performed by: NURSE PRACTITIONER

## 2019-08-26 PROCEDURE — 25010000002 RITUXIMAB 10 MG/ML SOLUTION 50 ML VIAL: Performed by: NURSE PRACTITIONER

## 2019-08-26 PROCEDURE — 80053 COMPREHEN METABOLIC PANEL: CPT | Performed by: NURSE PRACTITIONER

## 2019-08-26 PROCEDURE — 83615 LACTATE (LD) (LDH) ENZYME: CPT | Performed by: NURSE PRACTITIONER

## 2019-08-26 PROCEDURE — 25010000002 HYDROCORTISONE SODIUM SUCCINATE 100 MG RECONSTITUTED SOLUTION: Performed by: NURSE PRACTITIONER

## 2019-08-26 PROCEDURE — 84550 ASSAY OF BLOOD/URIC ACID: CPT | Performed by: NURSE PRACTITIONER

## 2019-08-26 PROCEDURE — 25010000002 PALONOSETRON PER 25 MCG: Performed by: NURSE PRACTITIONER

## 2019-08-26 PROCEDURE — 36416 COLLJ CAPILLARY BLOOD SPEC: CPT

## 2019-08-26 PROCEDURE — 96375 TX/PRO/DX INJ NEW DRUG ADDON: CPT | Performed by: NURSE PRACTITIONER

## 2019-08-26 PROCEDURE — 99213 OFFICE O/P EST LOW 20 MIN: CPT | Performed by: NURSE PRACTITIONER

## 2019-08-26 PROCEDURE — 25010000002 DEXAMETHASONE SODIUM PHOSPHATE 100 MG/10ML SOLUTION: Performed by: NURSE PRACTITIONER

## 2019-08-26 RX ORDER — ACETAMINOPHEN 325 MG/1
650 TABLET ORAL ONCE
Status: CANCELLED | OUTPATIENT
Start: 2019-08-26

## 2019-08-26 RX ORDER — FAMOTIDINE 10 MG/ML
20 INJECTION, SOLUTION INTRAVENOUS ONCE
Status: CANCELLED | OUTPATIENT
Start: 2019-08-26

## 2019-08-26 RX ORDER — FAMOTIDINE 10 MG/ML
20 INJECTION, SOLUTION INTRAVENOUS ONCE
Status: COMPLETED | OUTPATIENT
Start: 2019-08-26 | End: 2019-08-26

## 2019-08-26 RX ORDER — PROCHLORPERAZINE MALEATE 5 MG/1
10 TABLET ORAL ONCE
Status: COMPLETED | OUTPATIENT
Start: 2019-08-26 | End: 2019-08-26

## 2019-08-26 RX ORDER — MEPERIDINE HYDROCHLORIDE 50 MG/ML
25 INJECTION INTRAMUSCULAR; INTRAVENOUS; SUBCUTANEOUS
Status: CANCELLED | OUTPATIENT
Start: 2019-08-26 | End: 2019-08-27

## 2019-08-26 RX ORDER — MEPERIDINE HYDROCHLORIDE 50 MG/ML
25 INJECTION INTRAMUSCULAR; INTRAVENOUS; SUBCUTANEOUS
Status: DISCONTINUED | OUTPATIENT
Start: 2019-08-26 | End: 2019-08-26 | Stop reason: HOSPADM

## 2019-08-26 RX ORDER — DIPHENHYDRAMINE HYDROCHLORIDE 50 MG/ML
50 INJECTION INTRAMUSCULAR; INTRAVENOUS AS NEEDED
Status: CANCELLED | OUTPATIENT
Start: 2019-08-26

## 2019-08-26 RX ORDER — PALONOSETRON 0.05 MG/ML
0.25 INJECTION, SOLUTION INTRAVENOUS ONCE
Status: COMPLETED | OUTPATIENT
Start: 2019-08-26 | End: 2019-08-26

## 2019-08-26 RX ORDER — DIPHENHYDRAMINE HYDROCHLORIDE 50 MG/ML
50 INJECTION INTRAMUSCULAR; INTRAVENOUS AS NEEDED
Status: DISCONTINUED | OUTPATIENT
Start: 2019-08-26 | End: 2019-08-26 | Stop reason: HOSPADM

## 2019-08-26 RX ORDER — SODIUM CHLORIDE 9 MG/ML
250 INJECTION, SOLUTION INTRAVENOUS ONCE
Status: COMPLETED | OUTPATIENT
Start: 2019-08-26 | End: 2019-08-26

## 2019-08-26 RX ORDER — ACETAMINOPHEN 325 MG/1
650 TABLET ORAL ONCE
Status: COMPLETED | OUTPATIENT
Start: 2019-08-26 | End: 2019-08-26

## 2019-08-26 RX ORDER — SODIUM CHLORIDE 9 MG/ML
250 INJECTION, SOLUTION INTRAVENOUS ONCE
Status: CANCELLED | OUTPATIENT
Start: 2019-08-26

## 2019-08-26 RX ORDER — FAMOTIDINE 10 MG/ML
20 INJECTION, SOLUTION INTRAVENOUS AS NEEDED
Status: DISCONTINUED | OUTPATIENT
Start: 2019-08-26 | End: 2019-08-26 | Stop reason: HOSPADM

## 2019-08-26 RX ORDER — FAMOTIDINE 10 MG/ML
20 INJECTION, SOLUTION INTRAVENOUS AS NEEDED
Status: CANCELLED | OUTPATIENT
Start: 2019-08-26

## 2019-08-26 RX ORDER — PALONOSETRON 0.05 MG/ML
0.25 INJECTION, SOLUTION INTRAVENOUS ONCE
Status: CANCELLED | OUTPATIENT
Start: 2019-08-26

## 2019-08-26 RX ADMIN — DIPHENHYDRAMINE HYDROCHLORIDE 50 MG: 50 INJECTION, SOLUTION INTRAMUSCULAR; INTRAVENOUS at 09:58

## 2019-08-26 RX ADMIN — PROCHLORPERAZINE MALEATE 10 MG: 5 TABLET ORAL at 11:59

## 2019-08-26 RX ADMIN — BENDAMUSTINE HYDROCHLORIDE 200 MG: 25 INJECTION, SOLUTION INTRAVENOUS at 10:54

## 2019-08-26 RX ADMIN — DEXAMETHASONE SODIUM PHOSPHATE 12 MG: 10 INJECTION, SOLUTION INTRAMUSCULAR; INTRAVENOUS at 10:20

## 2019-08-26 RX ADMIN — FAMOTIDINE 20 MG: 10 INJECTION INTRAVENOUS at 09:58

## 2019-08-26 RX ADMIN — SODIUM CHLORIDE 250 ML: 9 INJECTION, SOLUTION INTRAVENOUS at 10:02

## 2019-08-26 RX ADMIN — RITUXIMAB 1130 MG: 10 INJECTION, SOLUTION INTRAVENOUS at 11:08

## 2019-08-26 RX ADMIN — HYDROCORTISONE SODIUM SUCCINATE 50 MG: 100 INJECTION, POWDER, FOR SOLUTION INTRAMUSCULAR; INTRAVENOUS at 11:58

## 2019-08-26 RX ADMIN — ACETAMINOPHEN 650 MG: 325 TABLET, FILM COATED ORAL at 09:58

## 2019-08-26 RX ADMIN — PALONOSETRON HYDROCHLORIDE 0.25 MG: 0.25 INJECTION, SOLUTION INTRAVENOUS at 09:58

## 2019-08-26 NOTE — PROGRESS NOTES
Patient's creatinine is 1.63 today. Discussed with ASHELY Camacho. Ok to treat today.   Lab Results   Component Value Date    GLUCOSE 118 08/26/2019    BUN 21 (H) 08/26/2019    CREATININE 1.63 (H) 08/26/2019    EGFRIFNONA 41 (L) 08/26/2019    BCR 12.9 08/26/2019    K 4.5 08/26/2019    CO2 21.4 (L) 08/26/2019    CALCIUM 9.3 08/26/2019    PROTENTOTREF 7.1 06/04/2019    ALBUMIN 3.80 08/26/2019    LABIL2 1.4 06/04/2019    AST 36 08/26/2019    ALT 39 08/26/2019

## 2019-08-26 NOTE — PROGRESS NOTES
Subjective .  Due for cycle 2 bendamustine/Rituxan, delayed 1 week.    REASONS FOR FOLLOWUP: Marginal zone lymphoma    HISTORY OF PRESENT ILLNESS:  The patient is a 78 y.o. year old male who is here for follow-up with the above-mentioned history.    Patient returns today for reevaluation prior to cycle 2 Treanda/Rituxan.  He reports he has been feeling well this past week.  He denies any issues with nausea, vomiting, diarrhea, or constipation.  He denies any skin rash.  He denies fevers or chills.  He has a good appetite.         Past Medical History:   Diagnosis Date   • Abnormal ECG    • BPH (benign prostatic hyperplasia)    • Chronic kidney disease    • Disease of thyroid gland    • Hyperlipidemia    • Hyperparathyroidism (CMS/HCC) 2017    parathyroidectomy   • Hypertension    • Nicotine dependence    • TB (pulmonary tuberculosis)        ONCOLOGIC HISTORY:  (History from previous dates can be found in the separate document.)    Current Outpatient Medications on File Prior to Visit   Medication Sig Dispense Refill   • acyclovir (ZOVIRAX) 400 MG tablet Take 1 tablet by mouth Daily. 60 tablet 5   • allopurinol (ZYLOPRIM) 300 MG tablet TAKE 1 TABLET BY MOUTH DAILY 90 tablet 1   • allopurinol (ZYLOPRIM) 300 MG tablet Take 0.5 tablets by mouth Daily. 15 tablet 0   • amLODIPine (NORVASC) 10 MG tablet TAKE 1 TABLET BY MOUTH EVERY DAY 90 tablet 0   • Ascorbic Acid (VITAMIN C PO) Take  by mouth.     • aspirin 81 MG tablet Take 81 mg by mouth.     • furosemide (LASIX) 20 MG tablet Take 20 mg by mouth Daily.     • HYDRALAZINE HCL PO Take 25 mg by mouth Daily.     • losartan (COZAAR) 100 MG tablet Take 100 mg by mouth Daily.     • METOPROLOL TARTRATE PO Take 25 mg by mouth Daily.     • Multiple Vitamins-Minerals (MULTIVITAL-M) tablet Take  by mouth Daily.     • Omega-3 Fatty Acids (FISH OIL) 1000 MG capsule capsule Take 1,000 mg by mouth Daily With Breakfast.     • ondansetron (ZOFRAN) 8 MG tablet Take 1 tablet by mouth 3  (Three) Times a Day As Needed for Nausea or Vomiting. 30 tablet 5   • VITAMIN E PO Take  by mouth.       No current facility-administered medications on file prior to visit.        ALLERGIES:     Allergies   Allergen Reactions   • Codeine Unknown (See Comments)     UNKNOWN         Social History     Socioeconomic History   • Marital status:      Spouse name: Not on file   • Number of children: 2   • Years of education: High school   • Highest education level: High school graduate   Occupational History     Employer: RETIRED   Social Needs   • Financial resource strain: Not hard at all   • Food insecurity:     Worry: Never true     Inability: Never true   • Transportation needs:     Medical: No     Non-medical: Not on file   Tobacco Use   • Smoking status: Current Every Day Smoker     Packs/day: 0.50     Years: 60.00     Pack years: 30.00     Types: Cigarettes   • Smokeless tobacco: Never Used   Substance and Sexual Activity   • Alcohol use: Yes     Alcohol/week: 1.2 oz     Types: 2 Cans of beer per week     Frequency: 2-4 times a month     Drinks per session: 1 or 2     Binge frequency: Never     Comment: 2-3 beers 1-2 days a week   • Drug use: No     Comment: 2 cups coffee daily   • Sexual activity: Defer     Partners: Female         Cancer-related family history includes Cancer in his father.     Review of Systems   Constitutional: Negative for appetite change, chills, fatigue, fever and unexpected weight change.   HENT:   Negative for mouth sores, nosebleeds, sore throat and trouble swallowing.    Respiratory: Negative for cough and shortness of breath.    Cardiovascular: Negative for chest pain and leg swelling.   Gastrointestinal: Negative for abdominal pain, constipation, diarrhea, nausea and vomiting.   Endocrine: Negative for hot flashes.   Genitourinary: Negative for difficulty urinating.    Musculoskeletal: Negative for arthralgias and myalgias.   Skin: Negative for rash.   Neurological: Negative  "for dizziness and extremity weakness.   Hematological: Negative for adenopathy. Does not bruise/bleed easily.   Psychiatric/Behavioral: Negative for sleep disturbance.       Objective      Vitals:    08/26/19 0813 08/26/19 0926   BP: 173/90 140/76   Pulse: 64    Resp: 18    Temp: 97.4 °F (36.3 °C)    TempSrc: Oral    SpO2: 97%    Weight: 102 kg (224 lb 12.8 oz)    Height: 179.5 cm (70.67\")    PainSc: 0-No pain      Current Status 8/26/2019   ECOG score 0     Physical Exam   Constitutional: He is oriented to person, place, and time. He appears well-developed and well-nourished. No distress.   HENT:   Head: Normocephalic and atraumatic.   Mouth/Throat: Oropharynx is clear and moist and mucous membranes are normal. No oropharyngeal exudate.   Eyes: Pupils are equal, round, and reactive to light.   Neck: Normal range of motion.   Cardiovascular: Normal rate, regular rhythm and normal heart sounds.   No murmur heard.  Pulmonary/Chest: Effort normal and breath sounds normal. No respiratory distress. He has no wheezes. He has no rhonchi. He has no rales.   Abdominal: Soft. Normal appearance and bowel sounds are normal. He exhibits no distension. There is no hepatosplenomegaly.   Musculoskeletal: Normal range of motion. He exhibits no edema.   Neurological: He is alert and oriented to person, place, and time.   Skin: Skin is warm and dry. No rash noted.   Psychiatric: He has a normal mood and affect.   Vitals reviewed.          RECENT LABS:  Results from last 7 days   Lab Units 08/26/19  0804 08/20/19  0808   WBC 10*3/mm3 67.20* 56.69*   NEUTROS ABS 10*3/mm3 4.89 5.89   HEMOGLOBIN g/dL 11.3* 11.3*   HEMATOCRIT % 33.4* 33.8*   PLATELETS 10*3/mm3 238 145     Results from last 7 days   Lab Units 08/26/19  0845 08/20/19  0903   SODIUM mmol/L 136 134   POTASSIUM mmol/L 4.5 4.3   CHLORIDE mmol/L 100 98   CO2 mmol/L 21.4* 21.8*   BUN mg/dL 21* 24*   CREATININE mg/dL 1.63* 1.90*   CALCIUM mg/dL 9.3 9.3   ALBUMIN g/dL 3.80 3.90 "   BILIRUBIN mg/dL 0.2 0.3   ALK PHOS U/L 113 93   ALT (SGPT) U/L 39 32   AST (SGOT) U/L 36 27   GLUCOSE mg/dL 118 142*         Component      Latest Ref Rng & Units 7/23/2019 7/24/2019 7/26/2019 7/29/2019   WBC      3.40 - 10.80 10*3/mm3 168.20 (HH) 96.92 (H) 89.85 (HH) 65.32 (HH)   RBC      4.14 - 5.80 10*6/mm3 3.97 (L) 3.88 (L) 3.71 (L) 3.79 (L)   Hemoglobin      13.0 - 17.7 g/dL 12.0 (L) 12.0 (L) 11.3 (L) 11.5 (L)   Hematocrit      37.5 - 51.0 % 38.4 37.6 35.2 (L) 35.0 (L)   MCV      79.0 - 97.0 fL 96.7 96.9 94.9 92.3   MCH      26.6 - 33.0 pg 30.2 30.9 30.5 30.3   MCHC      31.5 - 35.7 g/dL 31.3 (L) 31.9 32.1 32.9   RDW      12.3 - 15.4 % 14.1 14.1 14.7 14.4   RDW-SD      37.0 - 54.0 fl 48.8 49.2 50.9 48.4   MPV      6.0 - 12.0 fL 9.5 9.8 9.9 10.4   Platelets      140 - 450 10*3/mm3 151 147 99 (L) 91 (L)   Neutrophil Rel %      42.7 - 76.0 % 2.6 (L) 17.3 (L) 7.9 (L) 7.0 (L)   Lymphocyte Rel %      19.6 - 45.3 % 92.2 (H) 76.2 (H) 85.0 (H) 86.2 (H)   Monocyte Rel %      5.0 - 12.0 % 4.6 (L) 5.4 6.7 5.8   Eosinophil Rel %      0.3 - 6.2 % 0.3 0.0 (L) 0.1 (L) 0.6   Basophil Rel %      0.0 - 1.5 % 0.1 0.1 0.1 0.1   Immature Granulocyte Rel %      0.0 - 0.5 % 0.2 1.0 (H) 0.2 0.3   Neutrophils Absolute      1.70 - 7.00 10*3/mm3 4.49 16.71 (H) 6.99 4.55   Lymphocytes Absolute      0.70 - 3.10 10*3/mm3 155.15 (H) 73.87 (H) 76.41 (H) 56.31 (H)   Monocytes Absolute      0.10 - 0.90 10*3/mm3 7.73 (H) 5.27 (H) 6.06 (H) 3.79 (H)   Eosinophils Absolute      0.00 - 0.40 10*3/mm3 0.45 (H) 0.01 0.07 0.39   Basophils Absolute      0.00 - 0.20 10*3/mm3 0.09 0.05 0.10 0.07   Immature Grans, Absolute      0.00 - 0.05 10*3/mm3 0.29 (H) 1.01 (H) 0.22 (H) 0.21 (H)   nRBC      0.0 - 0.2 /100 WBC 0.0 0.0  0.0     Component      Latest Ref Rng & Units 8/6/2019 8/13/2019 8/20/2019   WBC      3.40 - 10.80 10*3/mm3 56.12 (HH) 59.41 (HH) 56.69 (H)   RBC      4.14 - 5.80 10*6/mm3 4.05 (L) 3.93 (L) 3.67 (L)   Hemoglobin      13.0 - 17.7 g/dL  12.3 (L) 11.9 (L) 11.3 (L)   Hematocrit      37.5 - 51.0 % 38.0 36.7 (L) 33.8 (L)   MCV      79.0 - 97.0 fL 93.8 93.4 92.1   MCH      26.6 - 33.0 pg 30.4 30.3 30.8   MCHC      31.5 - 35.7 g/dL 32.4 32.4 33.4   RDW      12.3 - 15.4 % 14.6 14.4 13.9   RDW-SD      37.0 - 54.0 fl 50.5 49.7 46.9   MPV      6.0 - 12.0 fL 10.0 9.8 10.5   Platelets      140 - 450 10*3/mm3 146 142 145   Neutrophil Rel %      42.7 - 76.0 % 9.9 (L) 8.2 (L) 10.5 (L)   Lymphocyte Rel %      19.6 - 45.3 % 83.3 (H) 81.0 (H) 69.0 (H)   Monocyte Rel %      5.0 - 12.0 % 5.7 9.4 18.0 (H)   Eosinophil Rel %      0.3 - 6.2 % 0.8 1.1 1.7   Basophil Rel %      0.0 - 1.5 % 0.2 0.2 0.4   Immature Granulocyte Rel %      0.0 - 0.5 % 0.1 0.1 0.4   Neutrophils Absolute      1.70 - 7.00 10*3/mm3 5.56 4.85 5.89   Lymphocytes Absolute      0.70 - 3.10 10*3/mm3 46.73 (H) 48.10 (H) 39.11 (H)   Monocytes Absolute      0.10 - 0.90 10*3/mm3 3.19 (H) 5.59 (H) 10.20 (H)   Eosinophils Absolute      0.00 - 0.40 10*3/mm3 0.45 (H) 0.68 (H) 0.99 (H)   Basophils Absolute      0.00 - 0.20 10*3/mm3 0.12 0.14 0.25 (H)   Immature Grans, Absolute      0.00 - 0.05 10*3/mm3 0.07 (H) 0.05 0.25 (H)   nRBC      0.0 - 0.2 /100 WBC   0.0       Assessment/Plan    1. Marginal zone lymphoma.  · Peripheral blood flow cytometry CD5/CD10/CD23 negative; positive for p53 and 13 Q deletion, negative for KAREN and trisomy 12.  · Bone marrow evaluation demonstrated involvement by low-grade B-cell lymphoma classified again as marginal zone lymphoma occupying 75% of the total marrow.  · Patient has no B symptoms, no bulky disease.    · Significant leukocytosis with WBC count for example today being 168,000.  · Patient already taking allopurinol 300 mg daily.  · Bendamustine/Rituxan initiated, 7/23/2019.  After receiving only 7 mL's of Rituxan the patient had a reaction, specifically he developed initially feelings of warmth and nausea.  He was given additional 20 mill grams of IV Pepcid.  Patient then  began reporting changes in vision as well as diaphoresis, and near syncopal.  He was given additional 2 mg of Solu-Cortef.  The patient was monitored.  We decided not to give further Rituxan that day, but he was brought back on day #2 and was able to complete his Rituxan without difficulty.  · 8/20/2019 due for cycle 2 months bendamustine /Rituxan, delayed due to slightly elevated creatinine 1.9 patient received IV hydration.  Improvement in absolute lymphocyte count to 39.11.   ·  Patient returns 8/26/2019 for reevaluation prior to cycle 2 bendamustine Rituxan.  His creatinine is improved to his baseline of around 1.63.  White blood cell count today is elevated from last week with an increase in the absolute site count from 39.11 last week, to 48.32 this week.  We will proceed with treatment today.  Patient will have weekly CBC, stat CMP, and uric acid for close evaluation.      2.  CKD, followed by PCP Dr. Dwyer.  Patient has had a creatinine around 1.6-1.8 for the last 3 years at least per review of records.  Creatinine  1.63 today.  Continue to closely monitor.    3.  Abdominal otic aneurysm, followed by Dr. 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  Continue to monitor.    5. Nausea: nausea after cycle 1 in the morning. He has antiemetics to use if needed.     6.  Rash on lower extremities : Felt to be related to allopurinol.  His allopurinol was decreased to 150 mg a day.    PLAN:  1.  Proceed with cycle 2 bendamustine/Rituxan today.  2.  In 1 week CBC with RN review with stat CMP and stat uric acid.  3 in 3 weeks CBC with RN review a stat CMP, and stat uric acid.  5.  Return for follow-up visit with Dr. Herrera in 4 weeks for reevaluation prior to cycle 3 bendamustine Rituxan..  In 2 weeks follow-up visit with nurse practitioner with CBC, stat CMP, and uric acid for toxicity check.              Cc:  Fide Parekh MD

## 2019-08-26 NOTE — PROGRESS NOTES
"1155:  Pt with complaint of nausea and feeling hot.  Rituxan stopped.  Rituxan had just been increased to 64cc/hr, 200 mg/hr.  1156:  Sumaya LUND at chairside.  Order received for 50 mg IV Solu-Cortef.  1158:  50 mg IV Solu-Cortef given IV push.  1159:  10 mg po Compazine given as ordered per Sumaya LUND.    1205:  Pt states \"feeling better.\"  B/P 115/64 HR 57.  NS at 100 cc/hr.    1222:  Rituxan restarted per Sumaya LUND at 32 cc/hr.  VSS.  No complaints.  "

## 2019-08-27 ENCOUNTER — INFUSION (OUTPATIENT)
Dept: ONCOLOGY | Facility: HOSPITAL | Age: 79
End: 2019-08-27

## 2019-08-27 VITALS
WEIGHT: 226.4 LBS | DIASTOLIC BLOOD PRESSURE: 72 MMHG | BODY MASS INDEX: 31.87 KG/M2 | TEMPERATURE: 97.7 F | SYSTOLIC BLOOD PRESSURE: 170 MMHG | HEART RATE: 71 BPM | RESPIRATION RATE: 18 BRPM

## 2019-08-27 DIAGNOSIS — C83.08 SMALL B-CELL LYMPHOMA OF LYMPH NODES OF MULTIPLE REGIONS (HCC): Primary | ICD-10-CM

## 2019-08-27 PROCEDURE — 25010000002 DEXAMETHASONE SODIUM PHOSPHATE 100 MG/10ML SOLUTION: Performed by: NURSE PRACTITIONER

## 2019-08-27 PROCEDURE — 96409 CHEMO IV PUSH SNGL DRUG: CPT | Performed by: NURSE PRACTITIONER

## 2019-08-27 PROCEDURE — 96375 TX/PRO/DX INJ NEW DRUG ADDON: CPT | Performed by: NURSE PRACTITIONER

## 2019-08-27 PROCEDURE — 25010000002 BENDAMUSTINE HCL 100 MG/4ML SOLUTION 4 ML VIAL: Performed by: NURSE PRACTITIONER

## 2019-08-27 RX ORDER — SODIUM CHLORIDE 9 MG/ML
250 INJECTION, SOLUTION INTRAVENOUS ONCE
Status: COMPLETED | OUTPATIENT
Start: 2019-08-27 | End: 2019-08-27

## 2019-08-27 RX ADMIN — DEXAMETHASONE SODIUM PHOSPHATE 12 MG: 10 INJECTION, SOLUTION INTRAMUSCULAR; INTRAVENOUS at 13:18

## 2019-08-27 RX ADMIN — BENDAMUSTINE HYDROCHLORIDE 200 MG: 25 INJECTION, SOLUTION INTRAVENOUS at 14:05

## 2019-08-27 RX ADMIN — SODIUM CHLORIDE 250 ML: 9 INJECTION, SOLUTION INTRAVENOUS at 13:18

## 2019-09-03 ENCOUNTER — APPOINTMENT (OUTPATIENT)
Dept: ONCOLOGY | Facility: HOSPITAL | Age: 79
End: 2019-09-03

## 2019-09-03 ENCOUNTER — APPOINTMENT (OUTPATIENT)
Dept: LAB | Facility: HOSPITAL | Age: 79
End: 2019-09-03

## 2019-09-03 ENCOUNTER — LAB (OUTPATIENT)
Dept: LAB | Facility: HOSPITAL | Age: 79
End: 2019-09-03

## 2019-09-03 ENCOUNTER — CLINICAL SUPPORT (OUTPATIENT)
Dept: ONCOLOGY | Facility: HOSPITAL | Age: 79
End: 2019-09-03

## 2019-09-03 VITALS
WEIGHT: 220.8 LBS | TEMPERATURE: 98.4 F | OXYGEN SATURATION: 95 % | DIASTOLIC BLOOD PRESSURE: 70 MMHG | SYSTOLIC BLOOD PRESSURE: 129 MMHG | HEART RATE: 58 BPM | BODY MASS INDEX: 31.08 KG/M2

## 2019-09-03 DIAGNOSIS — C83.08 SMALL B-CELL LYMPHOMA OF LYMPH NODES OF MULTIPLE REGIONS (HCC): Primary | ICD-10-CM

## 2019-09-03 DIAGNOSIS — C91.10 CLL (CHRONIC LYMPHOCYTIC LEUKEMIA) (HCC): ICD-10-CM

## 2019-09-03 LAB
ALBUMIN SERPL-MCNC: 3.8 G/DL (ref 3.5–5.2)
ALBUMIN/GLOB SERPL: 1.4 G/DL
ALP SERPL-CCNC: 158 U/L (ref 39–117)
ALT SERPL W P-5'-P-CCNC: 54 U/L (ref 1–41)
ANION GAP SERPL CALCULATED.3IONS-SCNC: 13.2 MMOL/L (ref 5–15)
AST SERPL-CCNC: 24 U/L (ref 1–40)
BASOPHILS # BLD AUTO: 0.05 10*3/MM3 (ref 0–0.2)
BASOPHILS NFR BLD AUTO: 0.7 % (ref 0–1.5)
BILIRUB SERPL-MCNC: 0.3 MG/DL (ref 0.2–1.2)
BUN BLD-MCNC: 25 MG/DL (ref 8–23)
BUN/CREAT SERPL: 13.3 (ref 7–25)
CALCIUM SPEC-SCNC: 9.6 MG/DL (ref 8.6–10.5)
CHLORIDE SERPL-SCNC: 100 MMOL/L (ref 98–107)
CO2 SERPL-SCNC: 21.8 MMOL/L (ref 22–29)
CREAT BLD-MCNC: 1.88 MG/DL (ref 0.76–1.27)
DEPRECATED RDW RBC AUTO: 50.2 FL (ref 37–54)
EOSINOPHIL # BLD AUTO: 0.7 10*3/MM3 (ref 0–0.4)
EOSINOPHIL NFR BLD AUTO: 9.7 % (ref 0.3–6.2)
ERYTHROCYTE [DISTWIDTH] IN BLOOD BY AUTOMATED COUNT: 14.7 % (ref 12.3–15.4)
GFR SERPL CREATININE-BSD FRML MDRD: 35 ML/MIN/1.73
GLOBULIN UR ELPH-MCNC: 2.8 GM/DL
GLUCOSE BLD-MCNC: 123 MG/DL (ref 65–99)
HCT VFR BLD AUTO: 35.6 % (ref 37.5–51)
HGB BLD-MCNC: 11.7 G/DL (ref 13–17.7)
IMM GRANULOCYTES # BLD AUTO: 0.04 10*3/MM3 (ref 0–0.05)
IMM GRANULOCYTES NFR BLD AUTO: 0.6 % (ref 0–0.5)
LYMPHOCYTES # BLD AUTO: 3.15 10*3/MM3 (ref 0.7–3.1)
LYMPHOCYTES NFR BLD AUTO: 43.8 % (ref 19.6–45.3)
MCH RBC QN AUTO: 30.5 PG (ref 26.6–33)
MCHC RBC AUTO-ENTMCNC: 32.9 G/DL (ref 31.5–35.7)
MCV RBC AUTO: 93 FL (ref 79–97)
MONOCYTES # BLD AUTO: 0.67 10*3/MM3 (ref 0.1–0.9)
MONOCYTES NFR BLD AUTO: 9.3 % (ref 5–12)
NEUTROPHILS # BLD AUTO: 2.59 10*3/MM3 (ref 1.7–7)
NEUTROPHILS NFR BLD AUTO: 35.9 % (ref 42.7–76)
PLATELET # BLD AUTO: 142 10*3/MM3 (ref 140–450)
PMV BLD AUTO: 10.1 FL (ref 6–12)
POTASSIUM BLD-SCNC: 4.9 MMOL/L (ref 3.5–5.2)
PROT SERPL-MCNC: 6.6 G/DL (ref 6–8.5)
RBC # BLD AUTO: 3.83 10*6/MM3 (ref 4.14–5.8)
SODIUM BLD-SCNC: 135 MMOL/L (ref 136–145)
URATE SERPL-MCNC: 6.7 MG/DL (ref 3.4–7)
WBC NRBC COR # BLD: 7.2 10*3/MM3 (ref 3.4–10.8)

## 2019-09-03 PROCEDURE — 85025 COMPLETE CBC W/AUTO DIFF WBC: CPT | Performed by: INTERNAL MEDICINE

## 2019-09-03 PROCEDURE — 84550 ASSAY OF BLOOD/URIC ACID: CPT | Performed by: INTERNAL MEDICINE

## 2019-09-03 PROCEDURE — 36415 COLL VENOUS BLD VENIPUNCTURE: CPT | Performed by: INTERNAL MEDICINE

## 2019-09-03 PROCEDURE — 80053 COMPREHEN METABOLIC PANEL: CPT | Performed by: INTERNAL MEDICINE

## 2019-09-03 NOTE — PROGRESS NOTES
Pt's lab reviewed with patient and wife.  BUN and createnine slightly higher today.  Lab reviewed with ASHELY Lewis.  Pt denies nausea at this time. He reports taking zofran every morning which has helped. Pt reports his appetite is okay and emptying his bladder quite often.  Pt instructed to increase his water intake and to call for any questions or issues.    Pt and wife V/U.

## 2019-09-09 ENCOUNTER — LAB (OUTPATIENT)
Dept: LAB | Facility: HOSPITAL | Age: 79
End: 2019-09-09

## 2019-09-09 ENCOUNTER — OFFICE VISIT (OUTPATIENT)
Dept: ONCOLOGY | Facility: CLINIC | Age: 79
End: 2019-09-09

## 2019-09-09 VITALS
RESPIRATION RATE: 14 BRPM | HEIGHT: 71 IN | BODY MASS INDEX: 31.04 KG/M2 | WEIGHT: 221.7 LBS | TEMPERATURE: 98 F | SYSTOLIC BLOOD PRESSURE: 145 MMHG | DIASTOLIC BLOOD PRESSURE: 70 MMHG | HEART RATE: 59 BPM | OXYGEN SATURATION: 97 %

## 2019-09-09 DIAGNOSIS — N18.30 STAGE 3 CHRONIC KIDNEY DISEASE (HCC): ICD-10-CM

## 2019-09-09 DIAGNOSIS — C83.08 SMALL B-CELL LYMPHOMA OF LYMPH NODES OF MULTIPLE REGIONS (HCC): Primary | ICD-10-CM

## 2019-09-09 DIAGNOSIS — R11.0 CHEMOTHERAPY-INDUCED NAUSEA: ICD-10-CM

## 2019-09-09 DIAGNOSIS — T45.1X5A CHEMOTHERAPY-INDUCED NAUSEA: ICD-10-CM

## 2019-09-09 LAB
ALBUMIN SERPL-MCNC: 4 G/DL (ref 3.5–5.2)
ALBUMIN/GLOB SERPL: 1.5 G/DL (ref 1.1–2.4)
ALP SERPL-CCNC: 135 U/L (ref 38–116)
ALT SERPL W P-5'-P-CCNC: 29 U/L (ref 0–41)
ANION GAP SERPL CALCULATED.3IONS-SCNC: 12.6 MMOL/L (ref 5–15)
AST SERPL-CCNC: 21 U/L (ref 0–40)
BASOPHILS # BLD AUTO: 0.07 10*3/MM3 (ref 0–0.2)
BASOPHILS NFR BLD AUTO: 1.3 % (ref 0–1.5)
BILIRUB SERPL-MCNC: 0.4 MG/DL (ref 0.2–1.2)
BUN BLD-MCNC: 21 MG/DL (ref 6–20)
BUN/CREAT SERPL: 11.2 (ref 7.3–30)
CALCIUM SPEC-SCNC: 9.5 MG/DL (ref 8.5–10.2)
CHLORIDE SERPL-SCNC: 97 MMOL/L (ref 98–107)
CO2 SERPL-SCNC: 25.4 MMOL/L (ref 22–29)
CREAT BLD-MCNC: 1.88 MG/DL (ref 0.7–1.3)
DEPRECATED RDW RBC AUTO: 51.4 FL (ref 37–54)
EOSINOPHIL # BLD AUTO: 0.42 10*3/MM3 (ref 0–0.4)
EOSINOPHIL NFR BLD AUTO: 7.5 % (ref 0.3–6.2)
ERYTHROCYTE [DISTWIDTH] IN BLOOD BY AUTOMATED COUNT: 15 % (ref 12.3–15.4)
GFR SERPL CREATININE-BSD FRML MDRD: 35 ML/MIN/1.73
GLOBULIN UR ELPH-MCNC: 2.6 GM/DL (ref 1.8–3.5)
GLUCOSE BLD-MCNC: 107 MG/DL (ref 74–124)
HCT VFR BLD AUTO: 34.5 % (ref 37.5–51)
HGB BLD-MCNC: 11.4 G/DL (ref 13–17.7)
IMM GRANULOCYTES # BLD AUTO: 0.06 10*3/MM3 (ref 0–0.05)
IMM GRANULOCYTES NFR BLD AUTO: 1.1 % (ref 0–0.5)
LYMPHOCYTES # BLD AUTO: 2.08 10*3/MM3 (ref 0.7–3.1)
LYMPHOCYTES NFR BLD AUTO: 37.1 % (ref 19.6–45.3)
MCH RBC QN AUTO: 31.1 PG (ref 26.6–33)
MCHC RBC AUTO-ENTMCNC: 33 G/DL (ref 31.5–35.7)
MCV RBC AUTO: 94 FL (ref 79–97)
MONOCYTES # BLD AUTO: 0.79 10*3/MM3 (ref 0.1–0.9)
MONOCYTES NFR BLD AUTO: 14.1 % (ref 5–12)
NEUTROPHILS # BLD AUTO: 2.18 10*3/MM3 (ref 1.7–7)
NEUTROPHILS NFR BLD AUTO: 38.9 % (ref 42.7–76)
NRBC BLD AUTO-RTO: 0 /100 WBC (ref 0–0.2)
PLATELET # BLD AUTO: 151 10*3/MM3 (ref 140–450)
PMV BLD AUTO: 9.5 FL (ref 6–12)
POTASSIUM BLD-SCNC: 5.1 MMOL/L (ref 3.5–4.7)
PROT SERPL-MCNC: 6.6 G/DL (ref 6.3–8)
RBC # BLD AUTO: 3.67 10*6/MM3 (ref 4.14–5.8)
SODIUM BLD-SCNC: 135 MMOL/L (ref 134–145)
URATE SERPL-MCNC: 5.5 MG/DL (ref 2.8–7.4)
WBC NRBC COR # BLD: 5.6 10*3/MM3 (ref 3.4–10.8)

## 2019-09-09 PROCEDURE — 85025 COMPLETE CBC W/AUTO DIFF WBC: CPT | Performed by: NURSE PRACTITIONER

## 2019-09-09 PROCEDURE — 99213 OFFICE O/P EST LOW 20 MIN: CPT | Performed by: NURSE PRACTITIONER

## 2019-09-09 PROCEDURE — 84550 ASSAY OF BLOOD/URIC ACID: CPT | Performed by: NURSE PRACTITIONER

## 2019-09-09 PROCEDURE — 36415 COLL VENOUS BLD VENIPUNCTURE: CPT | Performed by: NURSE PRACTITIONER

## 2019-09-09 PROCEDURE — G0463 HOSPITAL OUTPT CLINIC VISIT: HCPCS | Performed by: NURSE PRACTITIONER

## 2019-09-09 PROCEDURE — 80053 COMPREHEN METABOLIC PANEL: CPT | Performed by: NURSE PRACTITIONER

## 2019-09-09 NOTE — PROGRESS NOTES
Subjective .    REASONS FOR FOLLOWUP: Marginal zone lymphoma    HISTORY OF PRESENT ILLNESS:  The patient is a 78 y.o. year old male with the above-mentioned history, who returns the office today for 2-week toxicity check.  He last received treatment with cycle 2 Treanda Rituxan 8/26/2019.  This was following a 1 week delay due to dehydration with slightly elevated creatinine.  Patient required additional IV fluid support following cycle 1.     He reports today he tolerated cycle 2 very well.  He does have intermittent nausea, this is controlled with daily Zofran as needed.  He denies diarrhea.  He reports he is drinking 30 to 60 ounces of fluid daily.  He does have poor energy immediately following chemotherapy, though this improves with time.  His appetite remains adequate.  He has had resolution of his previous skin rash.  He denies night sweats, fevers or chills.      Past Medical History:   Diagnosis Date   • Abnormal ECG    • BPH (benign prostatic hyperplasia)    • Chronic kidney disease    • Disease of thyroid gland    • Hyperlipidemia    • Hyperparathyroidism (CMS/HCC) 2017    parathyroidectomy   • Hypertension    • Nicotine dependence    • TB (pulmonary tuberculosis)        ONCOLOGIC HISTORY:  (History from previous dates can be found in the separate document.)    Current Outpatient Medications on File Prior to Visit   Medication Sig Dispense Refill   • acyclovir (ZOVIRAX) 400 MG tablet Take 1 tablet by mouth Daily. 60 tablet 5   • allopurinol (ZYLOPRIM) 300 MG tablet TAKE 1 TABLET BY MOUTH DAILY 90 tablet 1   • allopurinol (ZYLOPRIM) 300 MG tablet Take 0.5 tablets by mouth Daily. 15 tablet 0   • amLODIPine (NORVASC) 10 MG tablet TAKE 1 TABLET BY MOUTH EVERY DAY 90 tablet 0   • Ascorbic Acid (VITAMIN C PO) Take  by mouth.     • aspirin 81 MG tablet Take 81 mg by mouth.     • furosemide (LASIX) 20 MG tablet Take 20 mg by mouth Daily.     • HYDRALAZINE HCL PO Take 25 mg by mouth Daily.     • losartan  (COZAAR) 100 MG tablet Take 100 mg by mouth Daily.     • METOPROLOL TARTRATE PO Take 25 mg by mouth Daily.     • Multiple Vitamins-Minerals (MULTIVITAL-M) tablet Take  by mouth Daily.     • Omega-3 Fatty Acids (FISH OIL) 1000 MG capsule capsule Take 1,000 mg by mouth Daily With Breakfast.     • ondansetron (ZOFRAN) 8 MG tablet Take 1 tablet by mouth 3 (Three) Times a Day As Needed for Nausea or Vomiting. 30 tablet 5   • VITAMIN E PO Take  by mouth.       No current facility-administered medications on file prior to visit.        ALLERGIES:     Allergies   Allergen Reactions   • Codeine Unknown (See Comments)     UNKNOWN         Social History     Socioeconomic History   • Marital status:      Spouse name: Not on file   • Number of children: 2   • Years of education: High school   • Highest education level: High school graduate   Occupational History     Employer: RETIRED   Social Needs   • Financial resource strain: Not hard at all   • Food insecurity:     Worry: Never true     Inability: Never true   • Transportation needs:     Medical: No     Non-medical: Not on file   Tobacco Use   • Smoking status: Current Every Day Smoker     Packs/day: 0.50     Years: 60.00     Pack years: 30.00     Types: Cigarettes   • Smokeless tobacco: Never Used   Substance and Sexual Activity   • Alcohol use: Yes     Alcohol/week: 1.2 oz     Types: 2 Cans of beer per week     Frequency: 2-4 times a month     Drinks per session: 1 or 2     Binge frequency: Never     Comment: 2-3 beers 1-2 days a week   • Drug use: No     Comment: 2 cups coffee daily   • Sexual activity: Defer     Partners: Female         Cancer-related family history includes Cancer in his father.     I have reviewed the patient's medical history in detail and updated the computerized patient record.    Review of Systems   Constitutional: Positive for fatigue. Negative for appetite change, chills, fever and unexpected weight change.   HENT:   Negative for mouth  "sores, nosebleeds, sore throat and trouble swallowing.    Respiratory: Negative for cough and shortness of breath.    Cardiovascular: Negative for chest pain and leg swelling.   Gastrointestinal: Positive for nausea. Negative for abdominal pain, constipation, diarrhea and vomiting.   Endocrine: Negative for hot flashes.   Genitourinary: Negative for difficulty urinating.    Musculoskeletal: Negative for arthralgias and myalgias.   Skin: Negative for rash.   Neurological: Negative for dizziness and extremity weakness.   Hematological: Negative for adenopathy. Does not bruise/bleed easily.   Psychiatric/Behavioral: Negative for sleep disturbance.   Review of systems 09/09/2019  unchanged from previous office visit except as updated.       Objective      Vitals:    09/09/19 1359   BP: 145/70   Pulse: 59   Resp: 14   Temp: 98 °F (36.7 °C)   TempSrc: Oral   SpO2: 97%   Weight: 101 kg (221 lb 11.2 oz)   Height: 179.5 cm (70.67\")   PainSc: 0-No pain     Current Status 9/9/2019   ECOG score 0     Physical Exam   Constitutional: He is oriented to person, place, and time. He appears well-developed and well-nourished. No distress.   HENT:   Head: Normocephalic and atraumatic.   Mouth/Throat: Oropharynx is clear and moist and mucous membranes are normal. No oropharyngeal exudate.   Eyes: Pupils are equal, round, and reactive to light.   Neck: Normal range of motion.   Cardiovascular: Normal rate, regular rhythm and normal heart sounds.   No murmur heard.  Pulmonary/Chest: Effort normal and breath sounds normal. No respiratory distress. He has no wheezes. He has no rhonchi. He has no rales.   Abdominal: Soft. Normal appearance and bowel sounds are normal. He exhibits no distension. There is no hepatosplenomegaly.   Musculoskeletal: Normal range of motion. He exhibits no edema.   Neurological: He is alert and oriented to person, place, and time.   Skin: Skin is warm and dry. No rash noted.   Resolving rash on bilateral lower " extremities with only slight hyperpigmentation remaining, no erythema noted.   Psychiatric: He has a normal mood and affect.   Vitals reviewed.  Physical exam 09/09/2019 unchanged from previous office visit except as updated.       RECENT LABS:  Results from last 7 days   Lab Units 09/09/19  1317 09/03/19  0841   WBC 10*3/mm3 5.60 7.20   NEUTROS ABS 10*3/mm3 2.18 2.59   HEMOGLOBIN g/dL 11.4* 11.7*   HEMATOCRIT % 34.5* 35.6*   PLATELETS 10*3/mm3 151 142     Results from last 7 days   Lab Units 09/09/19  1317 09/03/19  0841   SODIUM mmol/L 135 135*   POTASSIUM mmol/L 5.1* 4.9   CHLORIDE mmol/L 97* 100   CO2 mmol/L 25.4 21.8*   BUN mg/dL 21* 25*   CREATININE mg/dL 1.88* 1.88*   CALCIUM mg/dL 9.5 9.6   ALBUMIN g/dL 4.00 3.80   BILIRUBIN mg/dL 0.4 0.3   ALK PHOS U/L 135* 158*   ALT (SGPT) U/L 29 54*   AST (SGOT) U/L 21 24   GLUCOSE mg/dL 107 123*           Assessment/Plan    1. Marginal zone lymphoma.  · Peripheral blood flow cytometry CD5/CD10/CD23 negative; positive for p53 and 13 Q deletion, negative for KAREN and trisomy 12.  · Bone marrow evaluation demonstrated involvement by low-grade B-cell lymphoma classified again as marginal zone lymphoma occupying 75% of the total marrow.  · Patient has no B symptoms, no bulky disease.    · Significant leukocytosis with WBC count for example today being 168,000.  · Patient already taking allopurinol 300 mg daily.  · Bendamustine/Rituxan initiated, 7/23/2019.  After receiving only 7 mL's of Rituxan the patient had a reaction, specifically he developed initially feelings of warmth and nausea.  He was given additional 20mg of IV Pepcid.  Patient then began reporting changes in vision as well as diaphoresis, and near syncopal.  He was given additional 200 mg of Solu-Cortef.  The patient was monitored.  We decided not to give further Rituxan that day, but he was brought back on day #2 and was able to complete his Rituxan without difficulty.  · 8/20/2019 due for cycle 2 months  bendamustine /Rituxan, delayed due to slightly elevated creatinine 1.9 patient received IV hydration.  Improvement in absolute lymphocyte count to 39.11.   · Cycle 2 given 8/26/2019.  · Leukocytosis has improved today, in fact normalized at 5.6 compared to 67,000 the day of cycle 2.    2.  CKD, followed by Dr. Parekh, nephrology.  Patient has had a creatinine around 1.6-1.8 for the last 3 years at least per review of records.  Creatinine is 1.88 today.  We will fax results to his nephrologist.  We will continue to monitor counts, the patient may require IV fluid support next week.  He is without signs or symptoms of tumor lysis syndrome, uric acid 5.5.    3.  Abdominal otic aneurysm, followed by Dr. 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  Continue to monitor.    5. Nausea: nausea after cycle 1 in the morning.  Nausea is controlled with Zofran as needed    6.  Rash on lower extremities : Felt to be related to allopurinol.  His allopurinol was decreased to 150 mg a day. Rash has now resolved.     PLAN:  1. Continue Zofran as needed.  2. Continue to encourage oral hydration.  3. Todays labs will be faxed to nephrology, Dr. Parekh.  4. Return in 1 week for CBC, CMP, uric acid and RN review.  5. IV fluids next week pending CMP results  6. Return in 2 weeks for CBC, CMP, MD follow-up with Dr. Herrera and cycle 3 bendamustine Rituxan.     Soumya Brar, APRN  09/09/2019        Cc:  Fide Parekh MD

## 2019-09-19 ENCOUNTER — LAB (OUTPATIENT)
Dept: LAB | Facility: HOSPITAL | Age: 79
End: 2019-09-19

## 2019-09-19 ENCOUNTER — APPOINTMENT (OUTPATIENT)
Dept: ONCOLOGY | Facility: HOSPITAL | Age: 79
End: 2019-09-19

## 2019-09-19 ENCOUNTER — INFUSION (OUTPATIENT)
Dept: ONCOLOGY | Facility: HOSPITAL | Age: 79
End: 2019-09-19

## 2019-09-19 VITALS
DIASTOLIC BLOOD PRESSURE: 72 MMHG | HEART RATE: 53 BPM | SYSTOLIC BLOOD PRESSURE: 126 MMHG | OXYGEN SATURATION: 96 % | TEMPERATURE: 97.7 F

## 2019-09-19 DIAGNOSIS — C91.10 CLL (CHRONIC LYMPHOCYTIC LEUKEMIA) (HCC): Primary | ICD-10-CM

## 2019-09-19 LAB
ALBUMIN SERPL-MCNC: 3.9 G/DL (ref 3.5–5.2)
ALBUMIN/GLOB SERPL: 1.5 G/DL
ALP SERPL-CCNC: 138 U/L (ref 39–117)
ALT SERPL W P-5'-P-CCNC: 30 U/L (ref 1–41)
ANION GAP SERPL CALCULATED.3IONS-SCNC: 13 MMOL/L (ref 5–15)
AST SERPL-CCNC: 25 U/L (ref 1–40)
BASOPHILS # BLD AUTO: 0.07 10*3/MM3 (ref 0–0.2)
BASOPHILS NFR BLD AUTO: 2 % (ref 0–1.5)
BILIRUB SERPL-MCNC: 0.2 MG/DL (ref 0.2–1.2)
BUN BLD-MCNC: 21 MG/DL (ref 8–23)
BUN/CREAT SERPL: 14.2 (ref 7–25)
CALCIUM SPEC-SCNC: 9.4 MG/DL (ref 8.6–10.5)
CHLORIDE SERPL-SCNC: 102 MMOL/L (ref 98–107)
CO2 SERPL-SCNC: 22 MMOL/L (ref 22–29)
CREAT BLD-MCNC: 1.48 MG/DL (ref 0.76–1.27)
DEPRECATED RDW RBC AUTO: 51.5 FL (ref 37–54)
EOSINOPHIL # BLD AUTO: 0.32 10*3/MM3 (ref 0–0.4)
EOSINOPHIL NFR BLD AUTO: 9 % (ref 0.3–6.2)
ERYTHROCYTE [DISTWIDTH] IN BLOOD BY AUTOMATED COUNT: 15 % (ref 12.3–15.4)
GFR SERPL CREATININE-BSD FRML MDRD: 46 ML/MIN/1.73
GLOBULIN UR ELPH-MCNC: 2.6 GM/DL
GLUCOSE BLD-MCNC: 146 MG/DL (ref 65–99)
HCT VFR BLD AUTO: 34.3 % (ref 37.5–51)
HGB BLD-MCNC: 11.5 G/DL (ref 13–17.7)
IMM GRANULOCYTES # BLD AUTO: 0.2 10*3/MM3 (ref 0–0.05)
IMM GRANULOCYTES NFR BLD AUTO: 5.6 % (ref 0–0.5)
LYMPHOCYTES # BLD AUTO: 0.9 10*3/MM3 (ref 0.7–3.1)
LYMPHOCYTES NFR BLD AUTO: 25.2 % (ref 19.6–45.3)
MCH RBC QN AUTO: 31.3 PG (ref 26.6–33)
MCHC RBC AUTO-ENTMCNC: 33.5 G/DL (ref 31.5–35.7)
MCV RBC AUTO: 93.2 FL (ref 79–97)
MONOCYTES # BLD AUTO: 0.6 10*3/MM3 (ref 0.1–0.9)
MONOCYTES NFR BLD AUTO: 16.8 % (ref 5–12)
NEUTROPHILS # BLD AUTO: 1.48 10*3/MM3 (ref 1.7–7)
NEUTROPHILS NFR BLD AUTO: 41.4 % (ref 42.7–76)
NRBC BLD AUTO-RTO: 0 /100 WBC (ref 0–0.2)
PLATELET # BLD AUTO: 97 10*3/MM3 (ref 140–450)
PMV BLD AUTO: 9.7 FL (ref 6–12)
POTASSIUM BLD-SCNC: 4.7 MMOL/L (ref 3.5–5.2)
PROT SERPL-MCNC: 6.5 G/DL (ref 6–8.5)
RBC # BLD AUTO: 3.68 10*6/MM3 (ref 4.14–5.8)
SODIUM BLD-SCNC: 137 MMOL/L (ref 136–145)
URATE SERPL-MCNC: 5.1 MG/DL (ref 3.4–7)
WBC NRBC COR # BLD: 3.57 10*3/MM3 (ref 3.4–10.8)

## 2019-09-19 PROCEDURE — 80053 COMPREHEN METABOLIC PANEL: CPT

## 2019-09-19 PROCEDURE — 36415 COLL VENOUS BLD VENIPUNCTURE: CPT

## 2019-09-19 PROCEDURE — 85025 COMPLETE CBC W/AUTO DIFF WBC: CPT

## 2019-09-19 PROCEDURE — 84550 ASSAY OF BLOOD/URIC ACID: CPT

## 2019-09-19 NOTE — PROGRESS NOTES
Stable lab results reviewed with patient and wife.  Pt reports drinking plenty of fluids and feeling good.  Platelets and WBC  dropped some today but denies any bleeding, bruising, or any signs of infection like fever or sore throat. Pt to return next week for treatment.

## 2019-09-20 DIAGNOSIS — C83.08 SMALL B-CELL LYMPHOMA OF LYMPH NODES OF MULTIPLE REGIONS (HCC): ICD-10-CM

## 2019-09-24 ENCOUNTER — LAB (OUTPATIENT)
Dept: LAB | Facility: HOSPITAL | Age: 79
End: 2019-09-24

## 2019-09-24 ENCOUNTER — INFUSION (OUTPATIENT)
Dept: ONCOLOGY | Facility: HOSPITAL | Age: 79
End: 2019-09-24

## 2019-09-24 ENCOUNTER — OFFICE VISIT (OUTPATIENT)
Dept: ONCOLOGY | Facility: CLINIC | Age: 79
End: 2019-09-24

## 2019-09-24 VITALS
SYSTOLIC BLOOD PRESSURE: 125 MMHG | DIASTOLIC BLOOD PRESSURE: 62 MMHG | OXYGEN SATURATION: 98 % | BODY MASS INDEX: 31.23 KG/M2 | RESPIRATION RATE: 12 BRPM | TEMPERATURE: 97.5 F | HEIGHT: 71 IN | HEART RATE: 64 BPM | WEIGHT: 223.1 LBS

## 2019-09-24 VITALS — SYSTOLIC BLOOD PRESSURE: 151 MMHG | HEART RATE: 59 BPM | DIASTOLIC BLOOD PRESSURE: 70 MMHG

## 2019-09-24 DIAGNOSIS — N18.30 STAGE 3 CHRONIC KIDNEY DISEASE (HCC): ICD-10-CM

## 2019-09-24 DIAGNOSIS — C83.08 SMALL B-CELL LYMPHOMA OF LYMPH NODES OF MULTIPLE REGIONS (HCC): Primary | ICD-10-CM

## 2019-09-24 DIAGNOSIS — C83.08 SMALL B-CELL LYMPHOMA OF LYMPH NODES OF MULTIPLE REGIONS (HCC): ICD-10-CM

## 2019-09-24 DIAGNOSIS — I71.40 AAA (ABDOMINAL AORTIC ANEURYSM) WITHOUT RUPTURE (HCC): ICD-10-CM

## 2019-09-24 LAB
ALBUMIN SERPL-MCNC: 4 G/DL (ref 3.5–5.2)
ALBUMIN/GLOB SERPL: 1.7 G/DL (ref 1.1–2.4)
ALP SERPL-CCNC: 132 U/L (ref 38–116)
ALT SERPL W P-5'-P-CCNC: 16 U/L (ref 0–41)
ANION GAP SERPL CALCULATED.3IONS-SCNC: 14.4 MMOL/L (ref 5–15)
AST SERPL-CCNC: 28 U/L (ref 0–40)
BASOPHILS # BLD AUTO: 0.07 10*3/MM3 (ref 0–0.2)
BASOPHILS NFR BLD AUTO: 1.8 % (ref 0–1.5)
BILIRUB SERPL-MCNC: 0.3 MG/DL (ref 0.2–1.2)
BUN BLD-MCNC: 18 MG/DL (ref 6–20)
BUN/CREAT SERPL: 11.8 (ref 7.3–30)
CALCIUM SPEC-SCNC: 9.3 MG/DL (ref 8.5–10.2)
CHLORIDE SERPL-SCNC: 101 MMOL/L (ref 98–107)
CO2 SERPL-SCNC: 19.6 MMOL/L (ref 22–29)
CREAT BLD-MCNC: 1.52 MG/DL (ref 0.7–1.3)
DEPRECATED RDW RBC AUTO: 51.2 FL (ref 37–54)
EOSINOPHIL # BLD AUTO: 0.28 10*3/MM3 (ref 0–0.4)
EOSINOPHIL NFR BLD AUTO: 7.3 % (ref 0.3–6.2)
ERYTHROCYTE [DISTWIDTH] IN BLOOD BY AUTOMATED COUNT: 15.1 % (ref 12.3–15.4)
GFR SERPL CREATININE-BSD FRML MDRD: 45 ML/MIN/1.73
GLOBULIN UR ELPH-MCNC: 2.4 GM/DL (ref 1.8–3.5)
GLUCOSE BLD-MCNC: 106 MG/DL (ref 74–124)
HCT VFR BLD AUTO: 33.9 % (ref 37.5–51)
HGB BLD-MCNC: 11.4 G/DL (ref 13–17.7)
IMM GRANULOCYTES # BLD AUTO: 0.27 10*3/MM3 (ref 0–0.05)
IMM GRANULOCYTES NFR BLD AUTO: 7.1 % (ref 0–0.5)
LDH SERPL-CCNC: 283 U/L (ref 99–259)
LYMPHOCYTES # BLD AUTO: 0.92 10*3/MM3 (ref 0.7–3.1)
LYMPHOCYTES NFR BLD AUTO: 24.1 % (ref 19.6–45.3)
MCH RBC QN AUTO: 31.1 PG (ref 26.6–33)
MCHC RBC AUTO-ENTMCNC: 33.6 G/DL (ref 31.5–35.7)
MCV RBC AUTO: 92.6 FL (ref 79–97)
MONOCYTES # BLD AUTO: 0.57 10*3/MM3 (ref 0.1–0.9)
MONOCYTES NFR BLD AUTO: 15 % (ref 5–12)
NEUTROPHILS # BLD AUTO: 1.7 10*3/MM3 (ref 1.7–7)
NEUTROPHILS NFR BLD AUTO: 44.7 % (ref 42.7–76)
NRBC BLD AUTO-RTO: 0 /100 WBC (ref 0–0.2)
PHOSPHATE SERPL-MCNC: 3.3 MG/DL (ref 2.5–4.5)
PLATELET # BLD AUTO: 99 10*3/MM3 (ref 140–450)
PMV BLD AUTO: 10.4 FL (ref 6–12)
POTASSIUM BLD-SCNC: 4.9 MMOL/L (ref 3.5–4.7)
PROT SERPL-MCNC: 6.4 G/DL (ref 6.3–8)
RBC # BLD AUTO: 3.66 10*6/MM3 (ref 4.14–5.8)
SODIUM BLD-SCNC: 135 MMOL/L (ref 134–145)
URATE SERPL-MCNC: 5 MG/DL (ref 2.8–7.4)
WBC NRBC COR # BLD: 3.81 10*3/MM3 (ref 3.4–10.8)

## 2019-09-24 PROCEDURE — 96413 CHEMO IV INFUSION 1 HR: CPT | Performed by: INTERNAL MEDICINE

## 2019-09-24 PROCEDURE — 96415 CHEMO IV INFUSION ADDL HR: CPT | Performed by: INTERNAL MEDICINE

## 2019-09-24 PROCEDURE — 83615 LACTATE (LD) (LDH) ENZYME: CPT | Performed by: INTERNAL MEDICINE

## 2019-09-24 PROCEDURE — 85025 COMPLETE CBC W/AUTO DIFF WBC: CPT | Performed by: INTERNAL MEDICINE

## 2019-09-24 PROCEDURE — 25010000002 RITUXIMAB 10 MG/ML SOLUTION 10 ML VIAL: Performed by: INTERNAL MEDICINE

## 2019-09-24 PROCEDURE — 80053 COMPREHEN METABOLIC PANEL: CPT | Performed by: INTERNAL MEDICINE

## 2019-09-24 PROCEDURE — 36415 COLL VENOUS BLD VENIPUNCTURE: CPT | Performed by: INTERNAL MEDICINE

## 2019-09-24 PROCEDURE — 25010000002 RITUXIMAB 10 MG/ML SOLUTION 50 ML VIAL: Performed by: INTERNAL MEDICINE

## 2019-09-24 PROCEDURE — 25010000002 DIPHENHYDRAMINE PER 50 MG: Performed by: INTERNAL MEDICINE

## 2019-09-24 PROCEDURE — 84100 ASSAY OF PHOSPHORUS: CPT | Performed by: INTERNAL MEDICINE

## 2019-09-24 PROCEDURE — 99215 OFFICE O/P EST HI 40 MIN: CPT | Performed by: INTERNAL MEDICINE

## 2019-09-24 PROCEDURE — 25010000002 HYDROCORTISONE SODIUM SUCCINATE 100 MG RECONSTITUTED SOLUTION: Performed by: INTERNAL MEDICINE

## 2019-09-24 PROCEDURE — 84550 ASSAY OF BLOOD/URIC ACID: CPT | Performed by: INTERNAL MEDICINE

## 2019-09-24 PROCEDURE — 96375 TX/PRO/DX INJ NEW DRUG ADDON: CPT | Performed by: INTERNAL MEDICINE

## 2019-09-24 RX ORDER — SODIUM CHLORIDE 9 MG/ML
250 INJECTION, SOLUTION INTRAVENOUS ONCE
Status: CANCELLED | OUTPATIENT
Start: 2019-09-24

## 2019-09-24 RX ORDER — SODIUM CHLORIDE 9 MG/ML
250 INJECTION, SOLUTION INTRAVENOUS ONCE
Status: CANCELLED | OUTPATIENT
Start: 2019-09-25

## 2019-09-24 RX ORDER — FAMOTIDINE 10 MG/ML
20 INJECTION, SOLUTION INTRAVENOUS AS NEEDED
Status: CANCELLED | OUTPATIENT
Start: 2019-09-24

## 2019-09-24 RX ORDER — ACETAMINOPHEN 325 MG/1
650 TABLET ORAL ONCE
Status: CANCELLED | OUTPATIENT
Start: 2019-09-24

## 2019-09-24 RX ORDER — PALONOSETRON 0.05 MG/ML
0.25 INJECTION, SOLUTION INTRAVENOUS ONCE
Status: CANCELLED | OUTPATIENT
Start: 2019-09-24

## 2019-09-24 RX ORDER — DIPHENHYDRAMINE HYDROCHLORIDE 50 MG/ML
50 INJECTION INTRAMUSCULAR; INTRAVENOUS AS NEEDED
Status: CANCELLED | OUTPATIENT
Start: 2019-09-24

## 2019-09-24 RX ORDER — FAMOTIDINE 10 MG/ML
20 INJECTION, SOLUTION INTRAVENOUS ONCE
Status: CANCELLED | OUTPATIENT
Start: 2019-09-24

## 2019-09-24 RX ORDER — ACETAMINOPHEN 325 MG/1
650 TABLET ORAL ONCE
Status: COMPLETED | OUTPATIENT
Start: 2019-09-24 | End: 2019-09-24

## 2019-09-24 RX ORDER — MEPERIDINE HYDROCHLORIDE 50 MG/ML
25 INJECTION INTRAMUSCULAR; INTRAVENOUS; SUBCUTANEOUS
Status: CANCELLED | OUTPATIENT
Start: 2019-09-24 | End: 2019-09-25

## 2019-09-24 RX ORDER — SODIUM CHLORIDE 9 MG/ML
250 INJECTION, SOLUTION INTRAVENOUS ONCE
Status: COMPLETED | OUTPATIENT
Start: 2019-09-24 | End: 2019-09-24

## 2019-09-24 RX ORDER — FAMOTIDINE 10 MG/ML
20 INJECTION, SOLUTION INTRAVENOUS ONCE
Status: COMPLETED | OUTPATIENT
Start: 2019-09-24 | End: 2019-09-24

## 2019-09-24 RX ADMIN — SODIUM CHLORIDE 250 ML: 9 INJECTION, SOLUTION INTRAVENOUS at 09:01

## 2019-09-24 RX ADMIN — HYDROCORTISONE SODIUM SUCCINATE 100 MG: 100 INJECTION, POWDER, FOR SOLUTION INTRAMUSCULAR; INTRAVENOUS at 09:48

## 2019-09-24 RX ADMIN — FAMOTIDINE 20 MG: 10 INJECTION INTRAVENOUS at 09:20

## 2019-09-24 RX ADMIN — ACETAMINOPHEN 650 MG: 325 TABLET, FILM COATED ORAL at 09:01

## 2019-09-24 RX ADMIN — DIPHENHYDRAMINE HYDROCHLORIDE 25 MG: 50 INJECTION, SOLUTION INTRAMUSCULAR; INTRAVENOUS at 09:02

## 2019-09-24 RX ADMIN — RITUXIMAB 1130 MG: 10 INJECTION, SOLUTION INTRAVENOUS at 10:14

## 2019-09-24 NOTE — PROGRESS NOTES
Subjective .    REASONS FOR FOLLOWUP: Marginal zone lymphoma    HISTORY OF PRESENT ILLNESS:  This patient returns today to the office for followup. He is here today with no specific complaints pertinent to his small cell lymphoma marginal cell with tremendous degree of leukocytosis. The patient has undergone therapy with bendamustine and Rituxan and he has had a dramatic response with near complete resolution of his leukocytosis, stabilization of the hemoglobin, stabilization of the platelet count and no other new issues pertinent to infection. The patient's appetite has remained great, his bowel activity and urination have remained normal, his creatinine has remained stable. He has been seen by vascular surgery in regard to the recently found abdominal aortic aneurysm and he will be rechecked by vascular surgery at some point with no indication for any procedures at this time pertinent to this.     At this time the patient has no fevers, no chills, no sweats, no pruritus, level of energy level is normal, ECOG performance status is 0, no cancer related pain. He has not had any peripheral adenopathy. He has no pain.              Past Medical History:   Diagnosis Date   • Abnormal ECG    • BPH (benign prostatic hyperplasia)    • Chronic kidney disease    • Disease of thyroid gland    • Hyperlipidemia    • Hyperparathyroidism (CMS/HCC) 2017    parathyroidectomy   • Hypertension    • Nicotine dependence    • TB (pulmonary tuberculosis)        ONCOLOGIC HISTORY:  (History from previous dates can be found in the separate document.)    Current Outpatient Medications on File Prior to Visit   Medication Sig Dispense Refill   • acyclovir (ZOVIRAX) 400 MG tablet Take 1 tablet by mouth Daily. 60 tablet 5   • allopurinol (ZYLOPRIM) 300 MG tablet TAKE 1 TABLET BY MOUTH DAILY 90 tablet 1   • amLODIPine (NORVASC) 10 MG tablet TAKE 1 TABLET BY MOUTH EVERY DAY 90 tablet 0   • Ascorbic Acid (VITAMIN C PO) Take  by mouth.     •  aspirin 81 MG tablet Take 81 mg by mouth.     • furosemide (LASIX) 20 MG tablet Take 20 mg by mouth Daily.     • HYDRALAZINE HCL PO Take 25 mg by mouth Daily.     • losartan (COZAAR) 100 MG tablet Take 100 mg by mouth Daily.     • METOPROLOL TARTRATE PO Take 25 mg by mouth Daily.     • Multiple Vitamins-Minerals (MULTIVITAL-M) tablet Take  by mouth Daily.     • Omega-3 Fatty Acids (FISH OIL) 1000 MG capsule capsule Take 1,000 mg by mouth Daily With Breakfast.     • ondansetron (ZOFRAN) 8 MG tablet Take 1 tablet by mouth 3 (Three) Times a Day As Needed for Nausea or Vomiting. 30 tablet 5   • VITAMIN E PO Take  by mouth.     • allopurinol (ZYLOPRIM) 300 MG tablet Take 0.5 tablets by mouth Daily. 15 tablet 0     No current facility-administered medications on file prior to visit.        ALLERGIES:     Allergies   Allergen Reactions   • Codeine Unknown (See Comments)     UNKNOWN         Social History     Socioeconomic History   • Marital status:      Spouse name: Not on file   • Number of children: 2   • Years of education: High school   • Highest education level: High school graduate   Occupational History     Employer: RETIRED   Social Needs   • Financial resource strain: Not hard at all   • Food insecurity:     Worry: Never true     Inability: Never true   • Transportation needs:     Medical: No     Non-medical: Not on file   Tobacco Use   • Smoking status: Current Every Day Smoker     Packs/day: 0.50     Years: 60.00     Pack years: 30.00     Types: Cigarettes   • Smokeless tobacco: Never Used   Substance and Sexual Activity   • Alcohol use: Yes     Alcohol/week: 1.2 oz     Types: 2 Cans of beer per week     Frequency: 2-4 times a month     Drinks per session: 1 or 2     Binge frequency: Never     Comment: 2-3 beers 1-2 days a week   • Drug use: No     Comment: 2 cups coffee daily   • Sexual activity: Defer     Partners: Female         Cancer-related family history includes Cancer in his father.     I have  reviewed the patient's medical history in detail and updated the computerized patient record.    Review of Systems:  General: no fever, no chills, no fatigue,no weight changes, no lack of appetite.  Eyes: no epiphora, xerophthalmia,conjunctivitis, pain, glaucoma, blurred vision, blindness, secretion, photophobia, proptosis, diplopia.  Ears: no otorrhea, tinnitus, otorrhagia, deafness, pain, vertigo.  Nose: no rhinorrhea, no epistaxis, no alteration in perception of odors, no sinuses pressure.  Mouth: no alteration in gums or teeth,  No ulcers, no difficulty with mastication or deglut ion, no odynophagia.  Neck: no masses or pain, no thyroid alterations, no pain in muscles or arteries, no carotid odynia, no crepitation.  Respiratory: no cough, no sputum production,no dyspnea,no trepopnea, no pleuritic pain,no hemoptysis.  Heart: no syncope, no irregularity, no palpitations, no angina,no orthopnea,no paroxysmal nocturnal dyspnea.  Vascular Venous: no tenderness,no edema,no palpable cords,no postphlebitic syndrome, no skin changes no ulcerations.  Vascular Arterial: no distal ischemia, noclaudication, no gangrene, no neuropathic ischemic pain, no skin ulcers, no paleness no cyanosis.  GI: no dysphagia, no odynophagia, no regurgitation, no heartburn,no indigestion,no nausea,no vomiting,no hematemesis ,no melena,no jaundice,no distention, no obstipation,no enterorrhagia,no proctalgia,no anal  lesions, no changes in bowel habits.  : no frequency, no hesitancy, no hematuria, no discharge,no  pain.  Musculoskeletal: no muscle or tendon pain or inflammation,no  joint pain, no edema, no functional limitation,no fasciculations, no mass.  Neurologic: no headache, no seizures, noalterations on Craneal nerves, no motor deficit, no sensory deficit, normal coordination, no alteration in memory,normal orientation, calculation,normal writting, verbal and written language.  Skin: no rashes,no pruritus no localized  "lesions.  Psychiatric: no anxiety, no depression,no agitation, no delusions, proper insight.            Objective      Vitals:    09/24/19 0805   BP: 125/62   Pulse: 64   Resp: 12   Temp: 97.5 °F (36.4 °C)   TempSrc: Oral   SpO2: 98%   Weight: 101 kg (223 lb 1.6 oz)   Height: 179.5 cm (70.67\")   PainSc: 0-No pain     Current Status 9/24/2019   ECOG score 0     Physical Exam:  GENERAL:  Well-developed, well-nourished  Patient  in no acute distress.   SKIN:  Warm, dry ,NO rashes,NO purpura ,NO petechiae.  HEENT:  Pupils were equal and reactive to light and accomodation, conjunctivas non injected, no pterigion, normal extraocular movements, normal visual acuity.   Mouth mucosa was moist, no exudates in oropharynx, normal gum line, normal roof of the mouth and pillars, normal papillations of the tongue.  NECK:  Supple with good range of motion; no thyromegaly or masses, no JVD or bruits, no cervical adenopathies.No carotid arteries pain, no carotid abnormal pulsation , NO arterial dance.  LYMPHATICS:  No cervical, NO supraclavicular, NO axillary,NO epitrochlear , NO inguinal adenopathy.  CHEST:  Normal excursion of both ephraim thoraces, normal voice fremitus, no subcutaneous emphysema, normal axillas, no rashes or acanthosis nigricans. Lungs clear to percussion and auscultation, normal breath sounds bilaterally, no wheezing,NO crackles NO ronchi, NO stridor, NO rubs.  CARDIAC AND VASCULAR:  normal rate and regular rhythm, without murmurs,NO rubs NO S3 NO S4 right or left . Normal femoral, popliteal, pedis, brachial and carotid pulses.  ABDOMEN:  Soft, nontender with no organomegaly or masses, no ascites, no collateral circulation,no distention,no Nirav sign, no abdominal pain, no inguinal hernias,no umbilical hernia, no abdominal bruits. Normal bowel sounds.  GENITAL: Not  Performed.  EXTREMITIES  AND SPINE:  No clubbing, cyanosis or edema, no deformities or pain .No kyphosis, scoliosis, deformities or pain in spine, " ribs or pelvic bone.  NEUROLOGICAL:  Patient was awake, alert, oriented to time, person and place.              RECENT LABS:  Results from last 7 days   Lab Units 09/24/19  0750 09/19/19  0837   WBC 10*3/mm3 3.81 3.57   NEUTROS ABS 10*3/mm3 1.70 1.48*   HEMOGLOBIN g/dL 11.4* 11.5*   HEMATOCRIT % 33.9* 34.3*   PLATELETS 10*3/mm3 99* 97*     Results from last 7 days   Lab Units 09/19/19  0837   SODIUM mmol/L 137   POTASSIUM mmol/L 4.7   CHLORIDE mmol/L 102   CO2 mmol/L 22.0   BUN mg/dL 21   CREATININE mg/dL 1.48*   CALCIUM mg/dL 9.4   ALBUMIN g/dL 3.90   BILIRUBIN mg/dL 0.2   ALK PHOS U/L 138*   ALT (SGPT) U/L 30   AST (SGOT) U/L 25   GLUCOSE mg/dL 146*           Assessment/Plan    1. Marginal zone lymphoma.  · Peripheral blood flow cytometry CD5/CD10/CD23 negative; positive for p53 and 13 Q deletion, negative for KAREN and trisomy 12.  · Bone marrow evaluation demonstrated involvement by low-grade B-cell lymphoma classified again as marginal zone lymphoma occupying 75% of the total marrow.  · Patient has no B symptoms, no bulky disease.    · Significant leukocytosis with WBC count for example today being 168,000.  · Patient already taking allopurinol 300 mg daily.  · Bendamustine/Rituxan initiated, 7/23/2019.  After receiving only 7 mL's of Rituxan the patient had a reaction, specifically he developed initially feelings of warmth and nausea.  He was given additional 20mg of IV Pepcid.  Patient then began reporting changes in vision as well as diaphoresis, and near syncopal.  He was given additional 200 mg of Solu-Cortef.  The patient was monitored.  We decided not to give further Rituxan that day, but he was brought back on day #2 and was able to complete his Rituxan without difficulty.  · 8/20/2019 due for cycle 2 months bendamustine /Rituxan, delayed due to slightly elevated creatinine 1.9 patient received IV hydration.  Improvement in absolute lymphocyte count to 39.11.   · Cycle 2 given 8/26/2019.  Leukocytosis has  been gone today.    2.  CKD, followed by Dr. Parekh, nephrology.  Patient has had a creatinine around 1.6-1.8 for the last 3 years at least per review of records.  Creatinine is 1.88 today.  We will fax results to his nephrologist.  We will continue to monitor counts, the patient may require IV fluid support next week.  He is without signs or symptoms of tumor lysis syndrome, uric acid 5.5.    3.  Abdominal otic aneurysm, followed by Dr. 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  Continue to monitor.    5. Nausea: nausea after cycle 1 in the morning.  Nausea is controlled with Zofran as needed    6.  Rash on lower extremities : Felt to be related to allopurinol.  His allopurinol was decreased to 150 mg a day. Rash has now resolved.     PLAN:    This patient has had a dramatic response to the effects of chemotherapy with bendamustine and Rituxan, resolution of his leukocytosis, normalization of his performance status, stabilization of hemoglobin and platelet count. He has not encountered any side effects of the medicine with the exception of a minor reaction to Rituxan infusion that was controlled with proper medications.    Actually his white count today is low, his platelet count is 99,000 and the hemoglobin is 11 and the patient has no peripheral adenopathy or hepatosplenomegaly. He has no B symptoms, he has no infections and he has no autoimmunity. Under the present circumstances I do believe that the patient should have modification in his regimen as follows:  1. I think the patient is probably done with bendamustine at this time. We could always reintroduce this medicine down the road if necessary in a lower dose but right now I think the patient has achieved most of the benefit of the medicine and in order to minimize any potentiality with persistent low white count associated with this medication, we will hold off on further  Infusion of  bendamustine. On the other hand the patient will be able to handle Rituxan once a month for 3 more doses including today and thereafter he will initiate a maintenance Rituxan therapy in 04/2020 when he returns from [place].   2. It will be okay for the patient to discontinue his allopurinol at this time. He has not had any issues pertinent to uric acid or tumor lysis syndrome and he had a minor rash related to the medicine and the patient again will discontinue this medicine altogether at this time.   3. The patient will followup with his vascular surgeon in regard to his abdominal aortic aneurysm that has no implications at this time.  4. I find no need for radiological assessment on him at this time.  5. We will go ahead and send a peripheral blood flow cytometry today to reassess the monoclonal population of lymphocytes that he had in the peripheral blood documented in the original consultation.  6. I discussed all of these facts with the patient and his wife in detail.    I will review him back in 1 and 2 months with Rituxan infusions planned then along with a CBC and a CMP.       Vic Herrera MD  09/09/2019        Cc:  Fide Parekh MD

## 2019-09-24 NOTE — PROGRESS NOTES
At dc today, wife wanted clarification on acyclovir.  Per Dr. Herrera, pt is to continue acyclovir daily for the next 6-8 weeks.  Wife and pt v/u.

## 2019-09-25 ENCOUNTER — APPOINTMENT (OUTPATIENT)
Dept: ONCOLOGY | Facility: HOSPITAL | Age: 79
End: 2019-09-25

## 2019-09-25 LAB — REF LAB TEST METHOD: NORMAL

## 2019-09-25 RX ORDER — ALLOPURINOL 300 MG/1
TABLET ORAL
Qty: 15 TABLET | Refills: 0 | OUTPATIENT
Start: 2019-09-25

## 2019-10-16 DIAGNOSIS — C83.08 SMALL B-CELL LYMPHOMA OF LYMPH NODES OF MULTIPLE REGIONS (HCC): ICD-10-CM

## 2019-10-22 ENCOUNTER — OFFICE VISIT (OUTPATIENT)
Dept: ONCOLOGY | Facility: CLINIC | Age: 79
End: 2019-10-22

## 2019-10-22 ENCOUNTER — INFUSION (OUTPATIENT)
Dept: ONCOLOGY | Facility: HOSPITAL | Age: 79
End: 2019-10-22

## 2019-10-22 ENCOUNTER — LAB (OUTPATIENT)
Dept: LAB | Facility: HOSPITAL | Age: 79
End: 2019-10-22

## 2019-10-22 VITALS — SYSTOLIC BLOOD PRESSURE: 132 MMHG | HEART RATE: 55 BPM | DIASTOLIC BLOOD PRESSURE: 62 MMHG

## 2019-10-22 VITALS
TEMPERATURE: 97.7 F | BODY MASS INDEX: 31.05 KG/M2 | SYSTOLIC BLOOD PRESSURE: 144 MMHG | OXYGEN SATURATION: 97 % | RESPIRATION RATE: 14 BRPM | DIASTOLIC BLOOD PRESSURE: 70 MMHG | WEIGHT: 221.8 LBS | HEART RATE: 55 BPM | HEIGHT: 71 IN

## 2019-10-22 DIAGNOSIS — C83.08 SMALL B-CELL LYMPHOMA OF LYMPH NODES OF MULTIPLE REGIONS (HCC): ICD-10-CM

## 2019-10-22 DIAGNOSIS — C83.08 SMALL B-CELL LYMPHOMA OF LYMPH NODES OF MULTIPLE REGIONS (HCC): Primary | ICD-10-CM

## 2019-10-22 DIAGNOSIS — N18.30 STAGE 3 CHRONIC KIDNEY DISEASE (HCC): ICD-10-CM

## 2019-10-22 LAB
ALBUMIN SERPL-MCNC: 4.4 G/DL (ref 3.5–5.2)
ALBUMIN/GLOB SERPL: 1.8 G/DL (ref 1.1–2.4)
ALP SERPL-CCNC: 83 U/L (ref 38–116)
ALT SERPL W P-5'-P-CCNC: 20 U/L (ref 0–41)
ANION GAP SERPL CALCULATED.3IONS-SCNC: 14.4 MMOL/L (ref 5–15)
AST SERPL-CCNC: 23 U/L (ref 0–40)
BASOPHILS # BLD AUTO: 0.1 10*3/MM3 (ref 0–0.2)
BASOPHILS NFR BLD AUTO: 2 % (ref 0–1.5)
BILIRUB SERPL-MCNC: 0.4 MG/DL (ref 0.2–1.2)
BUN BLD-MCNC: 24 MG/DL (ref 6–20)
BUN/CREAT SERPL: 16.1 (ref 7.3–30)
CALCIUM SPEC-SCNC: 9.7 MG/DL (ref 8.5–10.2)
CHLORIDE SERPL-SCNC: 102 MMOL/L (ref 98–107)
CO2 SERPL-SCNC: 22.6 MMOL/L (ref 22–29)
CREAT BLD-MCNC: 1.49 MG/DL (ref 0.7–1.3)
DEPRECATED RDW RBC AUTO: 48.1 FL (ref 37–54)
EOSINOPHIL # BLD AUTO: 0.2 10*3/MM3 (ref 0–0.4)
EOSINOPHIL NFR BLD AUTO: 4 % (ref 0.3–6.2)
ERYTHROCYTE [DISTWIDTH] IN BLOOD BY AUTOMATED COUNT: 14 % (ref 12.3–15.4)
GFR SERPL CREATININE-BSD FRML MDRD: 46 ML/MIN/1.73
GLOBULIN UR ELPH-MCNC: 2.5 GM/DL (ref 1.8–3.5)
GLUCOSE BLD-MCNC: 114 MG/DL (ref 74–124)
HCT VFR BLD AUTO: 40.3 % (ref 37.5–51)
HGB BLD-MCNC: 13.6 G/DL (ref 13–17.7)
IMM GRANULOCYTES # BLD AUTO: 0.1 10*3/MM3 (ref 0–0.05)
IMM GRANULOCYTES NFR BLD AUTO: 2 % (ref 0–0.5)
LYMPHOCYTES # BLD AUTO: 2.57 10*3/MM3 (ref 0.7–3.1)
LYMPHOCYTES NFR BLD AUTO: 51 % (ref 19.6–45.3)
MCH RBC QN AUTO: 31.6 PG (ref 26.6–33)
MCHC RBC AUTO-ENTMCNC: 33.7 G/DL (ref 31.5–35.7)
MCV RBC AUTO: 93.7 FL (ref 79–97)
MONOCYTES # BLD AUTO: 0.97 10*3/MM3 (ref 0.1–0.9)
MONOCYTES NFR BLD AUTO: 19.2 % (ref 5–12)
NEUTROPHILS # BLD AUTO: 1.1 10*3/MM3 (ref 1.7–7)
NEUTROPHILS NFR BLD AUTO: 21.8 % (ref 42.7–76)
NRBC BLD AUTO-RTO: 0 /100 WBC (ref 0–0.2)
PLATELET # BLD AUTO: 129 10*3/MM3 (ref 140–450)
PMV BLD AUTO: 10.3 FL (ref 6–12)
POTASSIUM BLD-SCNC: 4.4 MMOL/L (ref 3.5–4.7)
PROT SERPL-MCNC: 6.9 G/DL (ref 6.3–8)
RBC # BLD AUTO: 4.3 10*6/MM3 (ref 4.14–5.8)
SODIUM BLD-SCNC: 139 MMOL/L (ref 134–145)
WBC NRBC COR # BLD: 5.04 10*3/MM3 (ref 3.4–10.8)

## 2019-10-22 PROCEDURE — 80053 COMPREHEN METABOLIC PANEL: CPT | Performed by: INTERNAL MEDICINE

## 2019-10-22 PROCEDURE — 36415 COLL VENOUS BLD VENIPUNCTURE: CPT

## 2019-10-22 PROCEDURE — 96415 CHEMO IV INFUSION ADDL HR: CPT | Performed by: INTERNAL MEDICINE

## 2019-10-22 PROCEDURE — 96375 TX/PRO/DX INJ NEW DRUG ADDON: CPT | Performed by: INTERNAL MEDICINE

## 2019-10-22 PROCEDURE — 25010000002 HYDROCORTISONE SODIUM SUCCINATE 100 MG RECONSTITUTED SOLUTION: Performed by: INTERNAL MEDICINE

## 2019-10-22 PROCEDURE — 25010000002 DIPHENHYDRAMINE PER 50 MG: Performed by: INTERNAL MEDICINE

## 2019-10-22 PROCEDURE — 96413 CHEMO IV INFUSION 1 HR: CPT | Performed by: INTERNAL MEDICINE

## 2019-10-22 PROCEDURE — 85025 COMPLETE CBC W/AUTO DIFF WBC: CPT

## 2019-10-22 PROCEDURE — 25010000002 RITUXIMAB 10 MG/ML SOLUTION 10 ML VIAL: Performed by: INTERNAL MEDICINE

## 2019-10-22 PROCEDURE — 25010000002 RITUXIMAB 10 MG/ML SOLUTION 50 ML VIAL: Performed by: INTERNAL MEDICINE

## 2019-10-22 PROCEDURE — 99214 OFFICE O/P EST MOD 30 MIN: CPT | Performed by: INTERNAL MEDICINE

## 2019-10-22 RX ORDER — DIPHENHYDRAMINE HYDROCHLORIDE 50 MG/ML
50 INJECTION INTRAMUSCULAR; INTRAVENOUS AS NEEDED
Status: CANCELLED | OUTPATIENT
Start: 2019-10-22

## 2019-10-22 RX ORDER — SODIUM CHLORIDE 9 MG/ML
250 INJECTION, SOLUTION INTRAVENOUS ONCE
Status: COMPLETED | OUTPATIENT
Start: 2019-10-22 | End: 2019-10-22

## 2019-10-22 RX ORDER — FAMOTIDINE 20 MG/1
20 TABLET, FILM COATED ORAL ONCE
Status: COMPLETED | OUTPATIENT
Start: 2019-10-22 | End: 2019-10-22

## 2019-10-22 RX ORDER — FAMOTIDINE 10 MG/ML
20 INJECTION, SOLUTION INTRAVENOUS AS NEEDED
Status: CANCELLED | OUTPATIENT
Start: 2019-10-22

## 2019-10-22 RX ORDER — MEPERIDINE HYDROCHLORIDE 50 MG/ML
25 INJECTION INTRAMUSCULAR; INTRAVENOUS; SUBCUTANEOUS
Status: CANCELLED | OUTPATIENT
Start: 2019-10-22 | End: 2019-10-23

## 2019-10-22 RX ORDER — SODIUM CHLORIDE 9 MG/ML
250 INJECTION, SOLUTION INTRAVENOUS ONCE
Status: CANCELLED | OUTPATIENT
Start: 2019-10-22

## 2019-10-22 RX ORDER — ACETAMINOPHEN 325 MG/1
650 TABLET ORAL ONCE
Status: CANCELLED | OUTPATIENT
Start: 2019-10-22

## 2019-10-22 RX ORDER — ACETAMINOPHEN 325 MG/1
650 TABLET ORAL ONCE
Status: COMPLETED | OUTPATIENT
Start: 2019-10-22 | End: 2019-10-22

## 2019-10-22 RX ORDER — FAMOTIDINE 20 MG/1
20 TABLET, FILM COATED ORAL ONCE
Status: CANCELLED | OUTPATIENT
Start: 2019-10-22

## 2019-10-22 RX ADMIN — HYDROCORTISONE SODIUM SUCCINATE 100 MG: 100 INJECTION, POWDER, FOR SOLUTION INTRAMUSCULAR; INTRAVENOUS at 10:10

## 2019-10-22 RX ADMIN — ACETAMINOPHEN 650 MG: 325 TABLET, FILM COATED ORAL at 09:24

## 2019-10-22 RX ADMIN — DIPHENHYDRAMINE HYDROCHLORIDE 25 MG: 50 INJECTION, SOLUTION INTRAMUSCULAR; INTRAVENOUS at 09:24

## 2019-10-22 RX ADMIN — FAMOTIDINE 20 MG: 20 TABLET ORAL at 09:24

## 2019-10-22 RX ADMIN — SODIUM CHLORIDE 250 ML: 9 INJECTION, SOLUTION INTRAVENOUS at 09:24

## 2019-10-22 RX ADMIN — RITUXIMAB 800 MG: 10 INJECTION, SOLUTION INTRAVENOUS at 10:12

## 2019-10-22 NOTE — PROGRESS NOTES
Subjective .    REASONS FOR FOLLOWUP: Marginal zone lymphoma    HISTORY OF PRESENT ILLNESS:  This patient returns today to the office for followup. He is in company of his wife stating that he has been feeling terrific for the last several weeks with no fevers, no chills, no sweats, no pruritus, no alteration in energy or appetite, ECOG performance status 0, no cancer related pain. He has not had any problems since the previous infusion of Rituxan and he is ready for another one today. Otherwise no other new symptoms.                   Past Medical History:   Diagnosis Date   • Abnormal ECG    • BPH (benign prostatic hyperplasia)    • Chronic kidney disease    • Disease of thyroid gland    • Hyperlipidemia    • Hyperparathyroidism (CMS/HCC) 2017    parathyroidectomy   • Hypertension    • Nicotine dependence    • TB (pulmonary tuberculosis)        ONCOLOGIC HISTORY:  (History from previous dates can be found in the separate document.)    Current Outpatient Medications on File Prior to Visit   Medication Sig Dispense Refill   • acyclovir (ZOVIRAX) 400 MG tablet Take 1 tablet by mouth Daily. 60 tablet 5   • amLODIPine (NORVASC) 10 MG tablet TAKE 1 TABLET BY MOUTH EVERY DAY 90 tablet 0   • Ascorbic Acid (VITAMIN C PO) Take  by mouth.     • aspirin 81 MG tablet Take 81 mg by mouth.     • furosemide (LASIX) 20 MG tablet Take 20 mg by mouth Daily.     • HYDRALAZINE HCL PO Take 25 mg by mouth Daily.     • losartan (COZAAR) 100 MG tablet Take 100 mg by mouth Daily.     • METOPROLOL TARTRATE PO Take 25 mg by mouth Daily.     • Multiple Vitamins-Minerals (MULTIVITAL-M) tablet Take  by mouth Daily.     • Omega-3 Fatty Acids (FISH OIL) 1000 MG capsule capsule Take 1,000 mg by mouth Daily With Breakfast.     • ondansetron (ZOFRAN) 8 MG tablet Take 1 tablet by mouth 3 (Three) Times a Day As Needed for Nausea or Vomiting. 30 tablet 5   • VITAMIN E PO Take  by mouth.     • allopurinol (ZYLOPRIM) 300 MG tablet TAKE 1 TABLET BY  MOUTH DAILY 90 tablet 1   • allopurinol (ZYLOPRIM) 300 MG tablet Take 0.5 tablets by mouth Daily. 15 tablet 0     No current facility-administered medications on file prior to visit.        ALLERGIES:     Allergies   Allergen Reactions   • Codeine Unknown (See Comments)     UNKNOWN         Social History     Socioeconomic History   • Marital status:      Spouse name: Not on file   • Number of children: 2   • Years of education: High school   • Highest education level: High school graduate   Occupational History     Employer: RETIRED   Social Needs   • Financial resource strain: Not hard at all   • Food insecurity:     Worry: Never true     Inability: Never true   • Transportation needs:     Medical: No     Non-medical: Not on file   Tobacco Use   • Smoking status: Current Every Day Smoker     Packs/day: 0.50     Years: 60.00     Pack years: 30.00     Types: Cigarettes   • Smokeless tobacco: Never Used   Substance and Sexual Activity   • Alcohol use: Yes     Alcohol/week: 1.2 oz     Types: 2 Cans of beer per week     Frequency: 2-4 times a month     Drinks per session: 1 or 2     Binge frequency: Never     Comment: 2-3 beers 1-2 days a week   • Drug use: No     Comment: 2 cups coffee daily   • Sexual activity: Defer     Partners: Female         Cancer-related family history includes Cancer in his father.           Review of Systems:        General: no fever, no chills, no fatigue,no weight changes, no lack of appetite.  Eyes: no epiphora, xerophthalmia,conjunctivitis, pain, glaucoma, blurred vision, blindness, secretion, photophobia, proptosis, diplopia.  Ears: no otorrhea, tinnitus, otorrhagia, deafness, pain, vertigo.  Nose: no rhinorrhea, no epistaxis, no alteration in perception of odors, no sinuses pressure.  · Mouth: no alteration in gums or teeth,  No ulcers, no difficulty with mastication or deglut ion, no odynophagia.  Neck: no masses or pain, no thyroid alterations, no pain in muscles or arteries, no  "carotid odynia, no crepitation.  Respiratory: no cough, no sputum production,no dyspnea,no trepopnea, no pleuritic pain,no hemoptysis.  Heart: no syncope, no irregularity, no palpitations, no angina,no orthopnea,no paroxysmal nocturnal dyspnea.  Vascular Venous: no tenderness,no edema,no palpable cords,no postphlebitic syndrome, no skin changes no ulcerations.  Vascular Arterial: no distal ischemia, noclaudication, no gangrene, no neuropathic ischemic pain, no skin ulcers, no paleness no cyanosis.  GI: no dysphagia, no odynophagia, no regurgitation, no heartburn,no indigestion,no nausea,no vomiting,no hematemesis ,no melena,no jaundice,no distention, no obstipation,no enterorrhagia,no proctalgia,no anal  lesions, no changes in bowel habits.  : no frequency, no hesitancy, no hematuria, no discharge,no  pain.  Musculoskeletal: no muscle or tendon pain or inflammation,no  joint pain, no edema, no functional limitation,no fasciculations, no mass.  Neurologic: no headache, no seizures, noalterations on Craneal nerves, no motor deficit, no sensory deficit, normal coordination, no alteration in memory,normal orientation, calculation,normal writting, verbal and written language.  Skin: no rashes,no pruritus no localized lesions.  Psychiatric: no anxiety, no depression,no agitation, no delusions, proper insight.        Objective      Vitals:    10/22/19 0845   BP: 144/70   Pulse: 55   Resp: 14   Temp: 97.7 °F (36.5 °C)   TempSrc: Oral   SpO2: 97%   Weight: 101 kg (221 lb 12.8 oz)   Height: 179.5 cm (70.67\")   PainSc: 0-No pain     Current Status 10/22/2019   ECOG score 0     Physical Exam:  GENERAL:  Well-developed, well-nourished  Patient  in no acute distress.   SKIN:  Warm, dry ,NO rashes,NO purpura ,NO petechiae.  HEENT:  Pupils were equal and reactive to light and accomodation, conjunctivas non injected, no pterigion, normal extraocular movements, normal visual acuity.   Mouth mucosa was moist, no exudates in " oropharynx, normal gum line, normal roof of the mouth and pillars, normal papillations of the tongue.  NECK:  Supple with good range of motion; no thyromegaly or masses, no JVD or bruits, no cervical adenopathies.No carotid arteries pain, no carotid abnormal pulsation , NO arterial dance.  LYMPHATICS:  No cervical, NO supraclavicular, NO axillary,NO epitrochlear , NO inguinal adenopathy.  CHEST:  Normal excursion of both ephraim thoraces, normal voice fremitus, no subcutaneous emphysema, normal axillas, no rashes or acanthosis nigricans. Lungs clear to percussion and auscultation, normal breath sounds bilaterally, no wheezing,NO crackles NO ronchi, NO stridor, NO rubs.  CARDIAC AND VASCULAR:  normal rate and regular rhythm, without murmurs,NO rubs NO S3 NO S4 right or left . Normal femoral, popliteal, pedis, brachial and carotid pulses.  ABDOMEN:  Soft, nontender with no organomegaly or masses, no ascites, no collateral circulation,no distention,no Nirav sign, no abdominal pain, no inguinal hernias,no umbilical hernia, no abdominal bruits. Normal bowel sounds.  GENITAL: Not  Performed.  EXTREMITIES  AND SPINE:  No clubbing, cyanosis or edema, no deformities or pain .No kyphosis, scoliosis, deformities or pain in spine, ribs or pelvic bone.  NEUROLOGICAL:  Patient was awake, alert, oriented to time, person and place.                    RECENT LABS:  Results from last 7 days   Lab Units 10/22/19  0810   WBC 10*3/mm3 5.04   NEUTROS ABS 10*3/mm3 1.10*   HEMOGLOBIN g/dL 13.6   HEMATOCRIT % 40.3   PLATELETS 10*3/mm3 129*               Assessment/Plan    1. Marginal zone lymphoma.  · Peripheral blood flow cytometry CD5/CD10/CD23 negative; positive for p53 and 13 Q deletion, negative for KAREN and trisomy 12.  · Bone marrow evaluation demonstrated involvement by low-grade B-cell lymphoma classified again as marginal zone lymphoma occupying 75% of the total marrow.  · Patient has no B symptoms, no bulky disease.    · Significant  leukocytosis with WBC count for example today being 168,000.  · Patient already taking allopurinol 300 mg daily.  · Bendamustine/Rituxan initiated, 7/23/2019.  After receiving only 7 mL's of Rituxan the patient had a reaction, specifically he developed initially feelings of warmth and nausea.  He was given additional 20mg of IV Pepcid.  Patient then began reporting changes in vision as well as diaphoresis, and near syncopal.  He was given additional 200 mg of Solu-Cortef.  The patient was monitored.  We decided not to give further Rituxan that day, but he was brought back on day #2 and was able to complete his Rituxan without difficulty.  · 8/20/2019 due for cycle 2 months bendamustine /Rituxan, delayed due to slightly elevated creatinine 1.9 patient received IV hydration.  Improvement in absolute lymphocyte count to 39.11.   · Cycle 2 given 8/26/2019.    The patient completed his bendamustine/Rituxan therapy and now he is receiving Rituxan for 3 more sessions 2nd one today until he goes to [place] by the end of the year. The patient so far has not had any B symptoms, no peripheral adenopathy, no hepatosplenomegaly, no pruritus and no other alteration related to his marginal cell non-Hodgkin's lymphoma. His peripheral blood flow cytometry on 09/24/2019 was very much negative for monoclonal population of cells.     The patient's hemoglobin has further improved, the white count is normal, the platelet count is normal. Therefore he will proceed with his Rituxan today, return for another infusion in a month and then will figure out how to proceed with blood work while in [place] for the winter months.    In regard to his abdominal aortic aneurysm he will have a followup appointment with Junaid Crockett MD in November.     In regard to his chronic kidney disease he will continue being monitored by his Nephrologist, Fide Parekh MD.    Otherwise no other intervention at this point.    The sad new though is that the  patient's wife has been in remission in regard to ovarian cancer for 5 years, now she has relapse of disease and she is going to be seen by [facility] today.    ·         Vic Herrera MD            Cc:  Fide Parekh MD

## 2019-10-22 NOTE — PROGRESS NOTES
Rituxan started. Sat with patient after infusion started. Patient stable no complaints at this time. Emergency button given to patient to call for any concerns or reactions. Possible reactions explained to patient.

## 2019-11-08 RX ORDER — AMLODIPINE BESYLATE 10 MG/1
TABLET ORAL
Qty: 30 TABLET | Refills: 0 | Status: SHIPPED | OUTPATIENT
Start: 2019-11-08 | End: 2019-11-08 | Stop reason: SDUPTHER

## 2019-11-11 RX ORDER — AMLODIPINE BESYLATE 10 MG/1
TABLET ORAL
Qty: 90 TABLET | Refills: 0 | Status: SHIPPED | OUTPATIENT
Start: 2019-11-11 | End: 2021-06-25

## 2019-11-19 ENCOUNTER — APPOINTMENT (OUTPATIENT)
Dept: ONCOLOGY | Facility: HOSPITAL | Age: 79
End: 2019-11-19

## 2019-11-19 ENCOUNTER — OFFICE VISIT (OUTPATIENT)
Dept: ONCOLOGY | Facility: CLINIC | Age: 79
End: 2019-11-19

## 2019-11-19 ENCOUNTER — LAB (OUTPATIENT)
Dept: LAB | Facility: HOSPITAL | Age: 79
End: 2019-11-19

## 2019-11-19 VITALS
HEIGHT: 71 IN | RESPIRATION RATE: 12 BRPM | WEIGHT: 226.4 LBS | OXYGEN SATURATION: 98 % | BODY MASS INDEX: 31.69 KG/M2 | DIASTOLIC BLOOD PRESSURE: 66 MMHG | TEMPERATURE: 97.7 F | HEART RATE: 55 BPM | SYSTOLIC BLOOD PRESSURE: 127 MMHG

## 2019-11-19 DIAGNOSIS — C83.08 SMALL B-CELL LYMPHOMA OF LYMPH NODES OF MULTIPLE REGIONS (HCC): ICD-10-CM

## 2019-11-19 DIAGNOSIS — C83.08 SMALL B-CELL LYMPHOMA OF LYMPH NODES OF MULTIPLE REGIONS (HCC): Primary | ICD-10-CM

## 2019-11-19 DIAGNOSIS — N18.30 STAGE 3 CHRONIC KIDNEY DISEASE (HCC): ICD-10-CM

## 2019-11-19 LAB
ALBUMIN SERPL-MCNC: 4.5 G/DL (ref 3.5–5.2)
ALBUMIN/GLOB SERPL: 1.8 G/DL (ref 1.1–2.4)
ALP SERPL-CCNC: 76 U/L (ref 38–116)
ALT SERPL W P-5'-P-CCNC: 20 U/L (ref 0–41)
ANION GAP SERPL CALCULATED.3IONS-SCNC: 12.8 MMOL/L (ref 5–15)
AST SERPL-CCNC: 22 U/L (ref 0–40)
BASOPHILS # BLD AUTO: 0.05 10*3/MM3 (ref 0–0.2)
BASOPHILS NFR BLD AUTO: 1.3 % (ref 0–1.5)
BILIRUB SERPL-MCNC: 0.3 MG/DL (ref 0.2–1.2)
BUN BLD-MCNC: 25 MG/DL (ref 6–20)
BUN/CREAT SERPL: 15.1 (ref 7.3–30)
CALCIUM SPEC-SCNC: 10 MG/DL (ref 8.5–10.2)
CHLORIDE SERPL-SCNC: 102 MMOL/L (ref 98–107)
CO2 SERPL-SCNC: 25.2 MMOL/L (ref 22–29)
CREAT BLD-MCNC: 1.66 MG/DL (ref 0.7–1.3)
DEPRECATED RDW RBC AUTO: 44.1 FL (ref 37–54)
EOSINOPHIL # BLD AUTO: 0.21 10*3/MM3 (ref 0–0.4)
EOSINOPHIL NFR BLD AUTO: 5.4 % (ref 0.3–6.2)
ERYTHROCYTE [DISTWIDTH] IN BLOOD BY AUTOMATED COUNT: 12.4 % (ref 12.3–15.4)
GFR SERPL CREATININE-BSD FRML MDRD: 40 ML/MIN/1.73
GLOBULIN UR ELPH-MCNC: 2.5 GM/DL (ref 1.8–3.5)
GLUCOSE BLD-MCNC: 121 MG/DL (ref 74–124)
HCT VFR BLD AUTO: 42.3 % (ref 37.5–51)
HGB BLD-MCNC: 14.2 G/DL (ref 13–17.7)
IMM GRANULOCYTES # BLD AUTO: 0 10*3/MM3 (ref 0–0.05)
IMM GRANULOCYTES NFR BLD AUTO: 0 % (ref 0–0.5)
LYMPHOCYTES # BLD AUTO: 2.23 10*3/MM3 (ref 0.7–3.1)
LYMPHOCYTES NFR BLD AUTO: 57.3 % (ref 19.6–45.3)
MCH RBC QN AUTO: 32.1 PG (ref 26.6–33)
MCHC RBC AUTO-ENTMCNC: 33.6 G/DL (ref 31.5–35.7)
MCV RBC AUTO: 95.7 FL (ref 79–97)
MONOCYTES # BLD AUTO: 0.88 10*3/MM3 (ref 0.1–0.9)
MONOCYTES NFR BLD AUTO: 22.6 % (ref 5–12)
NEUTROPHILS # BLD AUTO: 0.52 10*3/MM3 (ref 1.7–7)
NEUTROPHILS NFR BLD AUTO: 13.4 % (ref 42.7–76)
NRBC BLD AUTO-RTO: 0 /100 WBC (ref 0–0.2)
PLATELET # BLD AUTO: 148 10*3/MM3 (ref 140–450)
PMV BLD AUTO: 10 FL (ref 6–12)
POTASSIUM BLD-SCNC: 4.6 MMOL/L (ref 3.5–4.7)
PROT SERPL-MCNC: 7 G/DL (ref 6.3–8)
RBC # BLD AUTO: 4.42 10*6/MM3 (ref 4.14–5.8)
SODIUM BLD-SCNC: 140 MMOL/L (ref 134–145)
WBC NRBC COR # BLD: 3.89 10*3/MM3 (ref 3.4–10.8)

## 2019-11-19 PROCEDURE — 36415 COLL VENOUS BLD VENIPUNCTURE: CPT

## 2019-11-19 PROCEDURE — 80053 COMPREHEN METABOLIC PANEL: CPT

## 2019-11-19 PROCEDURE — G0463 HOSPITAL OUTPT CLINIC VISIT: HCPCS | Performed by: INTERNAL MEDICINE

## 2019-11-19 PROCEDURE — 85025 COMPLETE CBC W/AUTO DIFF WBC: CPT

## 2019-11-19 PROCEDURE — 99214 OFFICE O/P EST MOD 30 MIN: CPT | Performed by: INTERNAL MEDICINE

## 2019-11-19 NOTE — PROGRESS NOTES
Subjective .    REASONS FOR FOLLOWUP: Marginal zone lymphoma STAGE IV    HISTORY OF PRESENT ILLNESS:  This patient returns today to the office for followup. He is here in company of his wife stating that he has been feeling terrific for the last several weeks. His appetite is tremendous, he has gained some weight, he has normal bowel activity, normal urination still with some frequency associated with partial bladder outlet obstruction associated with an enlarged prostate. The patient is not having any cough, sputum production or shortness of breath. He has his usual postnasal drip. He has not had any fevers, chills, infections, sweats, peripheral adenopathy or rashes in the skin. Energy level is normal, ECOG performance status 0, no cancer related pain.                    Past Medical History:   Diagnosis Date   • Abnormal ECG    • BPH (benign prostatic hyperplasia)    • Chronic kidney disease    • Disease of thyroid gland    • Hyperlipidemia    • Hyperparathyroidism (CMS/HCC) 2017    parathyroidectomy   • Hypertension    • Nicotine dependence    • TB (pulmonary tuberculosis)        ONCOLOGIC HISTORY:  (History from previous dates can be found in the separate document.)    Current Outpatient Medications on File Prior to Visit   Medication Sig Dispense Refill   • acyclovir (ZOVIRAX) 400 MG tablet Take 1 tablet by mouth Daily. 60 tablet 5   • Ascorbic Acid (VITAMIN C PO) Take  by mouth.     • aspirin 81 MG tablet Take 81 mg by mouth.     • furosemide (LASIX) 20 MG tablet Take 20 mg by mouth Daily.     • HYDRALAZINE HCL PO Take 25 mg by mouth Daily.     • losartan (COZAAR) 100 MG tablet Take 100 mg by mouth Daily.     • METOPROLOL TARTRATE PO Take 25 mg by mouth Daily.     • Multiple Vitamins-Minerals (MULTIVITAL-M) tablet Take  by mouth Daily.     • Omega-3 Fatty Acids (FISH OIL) 1000 MG capsule capsule Take 1,000 mg by mouth Daily With Breakfast.     • ondansetron (ZOFRAN) 8 MG tablet Take 1 tablet by mouth 3  (Three) Times a Day As Needed for Nausea or Vomiting. 30 tablet 5   • VITAMIN E PO Take  by mouth.     • allopurinol (ZYLOPRIM) 300 MG tablet TAKE 1 TABLET BY MOUTH DAILY 90 tablet 1   • allopurinol (ZYLOPRIM) 300 MG tablet Take 0.5 tablets by mouth Daily. 15 tablet 0   • amLODIPine (NORVASC) 10 MG tablet TAKE 1 TABLET BY MOUTH EVERY DAY 90 tablet 0     No current facility-administered medications on file prior to visit.        ALLERGIES:     Allergies   Allergen Reactions   • Codeine Unknown (See Comments)     UNKNOWN         Social History     Socioeconomic History   • Marital status:      Spouse name: Not on file   • Number of children: 2   • Years of education: High school   • Highest education level: High school graduate   Occupational History     Employer: RETIRED   Social Needs   • Financial resource strain: Not hard at all   • Food insecurity:     Worry: Never true     Inability: Never true   • Transportation needs:     Medical: No     Non-medical: Not on file   Tobacco Use   • Smoking status: Current Every Day Smoker     Packs/day: 0.50     Years: 60.00     Pack years: 30.00     Types: Cigarettes   • Smokeless tobacco: Never Used   Substance and Sexual Activity   • Alcohol use: Yes     Alcohol/week: 1.2 oz     Types: 2 Cans of beer per week     Frequency: 2-4 times a month     Drinks per session: 1 or 2     Binge frequency: Never     Comment: 2-3 beers 1-2 days a week   • Drug use: No     Comment: 2 cups coffee daily   • Sexual activity: Defer     Partners: Female         Cancer-related family history includes Cancer in his father.           Review of Systems:      General: no fever, no chills, no fatigue,no weight changes, no lack of appetite.  Eyes: no epiphora, xerophthalmia,conjunctivitis, pain, glaucoma, blurred vision, blindness, secretion, photophobia, proptosis, diplopia.  Ears: no otorrhea, tinnitus, otorrhagia, deafness, pain, vertigo.  Nose: no rhinorrhea, no epistaxis, no alteration in  "perception of odors, no sinuses pressure.  Mouth: no alteration in gums or teeth,  No ulcers, no difficulty with mastication or deglut ion, no odynophagia.  Neck: no masses or pain, no thyroid alterations, no pain in muscles or arteries, no carotid odynia, no crepitation.  Respiratory: no cough, no sputum production,no dyspnea,no trepopnea, no pleuritic pain,no hemoptysis.  Heart: no syncope, no irregularity, no palpitations, no angina,no orthopnea,no paroxysmal nocturnal dyspnea.  Vascular Venous: no tenderness,no edema,no palpable cords,no postphlebitic syndrome, no skin changes no ulcerations.  Vascular Arterial: no distal ischemia, noclaudication, no gangrene, no neuropathic ischemic pain, no skin ulcers, no paleness no cyanosis.  GI: no dysphagia, no odynophagia, no regurgitation, no heartburn,no indigestion,no nausea,no vomiting,no hematemesis ,no melena,no jaundice,no distention, no obstipation,no enterorrhagia,no proctalgia,no anal  lesions, no changes in bowel habits.  : no frequency, no hesitancy, no hematuria, no discharge,no  pain.  Musculoskeletal: no muscle or tendon pain or inflammation,no  joint pain, no edema, no functional limitation,no fasciculations, no mass.  Neurologic: no headache, no seizures, noalterations on Craneal nerves, no motor deficit, no sensory deficit, normal coordination, no alteration in memory,normal orientation, calculation,normal writting, verbal and written language.  Skin: no rashes,no pruritus no localized lesions.  Psychiatric: no anxiety, no depression,no agitation, no delusions, proper insight.            Objective      Vitals:    11/19/19 0832   BP: 127/66   Pulse: 55   Resp: 12   Temp: 97.7 °F (36.5 °C)   TempSrc: Oral   SpO2: 98%   Weight: 103 kg (226 lb 6.4 oz)   Height: 179.5 cm (70.67\")   PainSc: 0-No pain     Current Status 11/19/2019   ECOG score 0     Physical Exam:  GENERAL:  Well-developed, well-nourished  Patient  in no acute distress.   SKIN:  Warm, dry " ,NO rashes,NO purpura ,NO petechiae.  HEENT:  Pupils were equal and reactive to light and accomodation, conjunctivas non injected, no pterigion, normal extraocular movements, normal visual acuity.   Mouth mucosa was moist, no exudates in oropharynx, normal gum line, normal roof of the mouth and pillars, normal papillations of the tongue.  NECK:  Supple with good range of motion; no thyromegaly or masses, no JVD or bruits, no cervical adenopathies.No carotid arteries pain, no carotid abnormal pulsation , NO arterial dance.  LYMPHATICS:  No cervical, NO supraclavicular, NO axillary,NO epitrochlear , NO inguinal adenopathy.  CHEST:  Normal excursion of both ephraim thoraces, normal voice fremitus, no subcutaneous emphysema, normal axillas, no rashes or acanthosis nigricans. Lungs clear to percussion and auscultation, normal breath sounds bilaterally, no wheezing,NO crackles NO ronchi, NO stridor, NO rubs.  CARDIAC AND VASCULAR:  normal rate and regular rhythm, without murmurs,NO rubs NO S3 NO S4 right or left . Normal femoral, popliteal, pedis, brachial and carotid pulses.  ABDOMEN:  Soft, nontender with no organomegaly or masses, no ascites, no collateral circulation,no distention,no Nirav sign, no abdominal pain, no inguinal hernias,no umbilical hernia, no abdominal bruits. Normal bowel sounds.  GENITAL: Not  Performed.  EXTREMITIES  AND SPINE:  No clubbing, cyanosis or edema, no deformities or pain .No kyphosis, scoliosis, deformities or pain in spine, ribs or pelvic bone.  NEUROLOGICAL:  Patient was awake, alert, oriented to time, person and place.                          RECENT LABS:  Results from last 7 days   Lab Units 11/19/19  0814   WBC 10*3/mm3 3.89   NEUTROS ABS 10*3/mm3 0.52*   HEMOGLOBIN g/dL 14.2   HEMATOCRIT % 42.3   PLATELETS 10*3/mm3 148     Results from last 7 days   Lab Units 11/19/19  0814   SODIUM mmol/L 140   POTASSIUM mmol/L 4.6   CHLORIDE mmol/L 102   CO2 mmol/L 25.2   BUN mg/dL 25*   CREATININE  mg/dL 1.66*   CALCIUM mg/dL 10.0   ALBUMIN g/dL 4.50   BILIRUBIN mg/dL 0.3   ALK PHOS U/L 76   ALT (SGPT) U/L 20   AST (SGOT) U/L 22   GLUCOSE mg/dL 121           Assessment/Plan    1. Marginal zone lymphoma.  · Peripheral blood flow cytometry CD5/CD10/CD23 negative; positive for p53 and 13 Q deletion, negative for KAREN and trisomy 12.  · Bone marrow evaluation demonstrated involvement by low-grade B-cell lymphoma classified again as marginal zone lymphoma occupying 75% of the total marrow.  · Patient has no B symptoms, no bulky disease.    · Significant leukocytosis with WBC count for example today being 168,000.  · Patient already taking allopurinol 300 mg daily.  · Bendamustine/Rituxan initiated, 7/23/2019.  After receiving only 7 mL's of Rituxan the patient had a reaction, specifically he developed initially feelings of warmth and nausea.  He was given additional 20mg of IV Pepcid.  Patient then began reporting changes in vision as well as diaphoresis, and near syncopal.  He was given additional 200 mg of Solu-Cortef.  The patient was monitored.  We decided not to give further Rituxan that day, but he was brought back on day #2 and was able to complete his Rituxan without difficulty.  · 8/20/2019 due for cycle 2 months bendamustine /Rituxan, delayed due to slightly elevated creatinine 1.9 patient received IV hydration.  Improvement in absolute lymphocyte count to 39.11.   · Cycle 2 given 8/26/2019.    The patient completed his bendamustine/Rituxan therapy and now he is receiving Rituxan for 3 more sessions 2nd one today until he goes to [place] by the end of the year. The patient so far has not had any B symptoms, no peripheral adenopathy, no hepatosplenomegaly, no pruritus and no other alteration related to his marginal cell non-Hodgkin's lymphoma. His peripheral blood flow cytometry on 09/24/2019 was very much negative for monoclonal population of cells.       The patient was further reviewed on 11/19/2019. His  physical exam is unremarkable, he had no peripheral adenopathy, hepatosplenomegaly, B symptoms. His white count is 4000, the hemoglobin is 14.1, the platelet count has further normalized. The only thing new though is that he has now developed peripheral lymphocytosis. We are in the process of reviewing his peripheral blood. This raises the question if there is a component of resistance to his primary disease, his lymphoma to the regimen of chemotherapy medicines that he has received including bendamustine and Rituxan.     I do believe that the patient needs to have a little bit of time out in regard to his treatment, not because of side effects or how bad he feels, actually he feels terrific. It is the concern about peripheral lymphocytosis. Therefore my advice to him is as follows:  1. We will hold off on any further Rituxan infusion.  2. Peripheral blood flow cytometry will be sent out today.   3. The patient will be informed about his peripheral blood flow cytometry. If this has positivity we will need to change his therapy and consideration will be given for him to go into Imbruvica or venetoclax.     The patient otherwise will have a tentative appointment to come back and see me back in 3-4 weeks before he goes to [place] for the winter months returning back to [place] in April. Obviously the logistics of his care will determine for what is found at this point.  I discussed all of these facts with the patient and his wife.           Vic Herrera MD            Cc:  Fide Parekh MD

## 2019-11-20 LAB — REF LAB TEST METHOD: NORMAL

## 2019-11-22 ENCOUNTER — TELEPHONE (OUTPATIENT)
Dept: ONCOLOGY | Facility: CLINIC | Age: 79
End: 2019-11-22

## 2019-12-06 ENCOUNTER — LAB (OUTPATIENT)
Dept: LAB | Facility: HOSPITAL | Age: 79
End: 2019-12-06

## 2019-12-06 ENCOUNTER — OFFICE VISIT (OUTPATIENT)
Dept: ONCOLOGY | Facility: CLINIC | Age: 79
End: 2019-12-06

## 2019-12-06 ENCOUNTER — INFUSION (OUTPATIENT)
Dept: ONCOLOGY | Facility: HOSPITAL | Age: 79
End: 2019-12-06

## 2019-12-06 VITALS — DIASTOLIC BLOOD PRESSURE: 80 MMHG | SYSTOLIC BLOOD PRESSURE: 160 MMHG | HEART RATE: 60 BPM

## 2019-12-06 VITALS
DIASTOLIC BLOOD PRESSURE: 71 MMHG | BODY MASS INDEX: 32.26 KG/M2 | OXYGEN SATURATION: 98 % | WEIGHT: 230.4 LBS | RESPIRATION RATE: 16 BRPM | HEIGHT: 71 IN | TEMPERATURE: 97.8 F | SYSTOLIC BLOOD PRESSURE: 168 MMHG | HEART RATE: 56 BPM

## 2019-12-06 DIAGNOSIS — C83.08 SMALL B-CELL LYMPHOMA OF LYMPH NODES OF MULTIPLE REGIONS (HCC): ICD-10-CM

## 2019-12-06 DIAGNOSIS — C83.08 SMALL B-CELL LYMPHOMA OF LYMPH NODES OF MULTIPLE REGIONS (HCC): Primary | ICD-10-CM

## 2019-12-06 LAB
ALBUMIN SERPL-MCNC: 4.4 G/DL (ref 3.5–5.2)
ALBUMIN/GLOB SERPL: 2.1 G/DL (ref 1.1–2.4)
ALP SERPL-CCNC: 80 U/L (ref 38–116)
ALT SERPL W P-5'-P-CCNC: 28 U/L (ref 0–41)
ANION GAP SERPL CALCULATED.3IONS-SCNC: 14.3 MMOL/L (ref 5–15)
AST SERPL-CCNC: 24 U/L (ref 0–40)
BASOPHILS # BLD AUTO: 0.05 10*3/MM3 (ref 0–0.2)
BASOPHILS NFR BLD AUTO: 0.9 % (ref 0–1.5)
BILIRUB SERPL-MCNC: 0.3 MG/DL (ref 0.2–1.2)
BUN BLD-MCNC: 21 MG/DL (ref 6–20)
BUN/CREAT SERPL: 13.7 (ref 7.3–30)
CALCIUM SPEC-SCNC: 9.3 MG/DL (ref 8.5–10.2)
CHLORIDE SERPL-SCNC: 103 MMOL/L (ref 98–107)
CO2 SERPL-SCNC: 24.7 MMOL/L (ref 22–29)
CREAT BLD-MCNC: 1.53 MG/DL (ref 0.7–1.3)
DEPRECATED RDW RBC AUTO: 40.5 FL (ref 37–54)
EOSINOPHIL # BLD AUTO: 0.18 10*3/MM3 (ref 0–0.4)
EOSINOPHIL NFR BLD AUTO: 3.3 % (ref 0.3–6.2)
ERYTHROCYTE [DISTWIDTH] IN BLOOD BY AUTOMATED COUNT: 11.9 % (ref 12.3–15.4)
GFR SERPL CREATININE-BSD FRML MDRD: 44 ML/MIN/1.73
GLOBULIN UR ELPH-MCNC: 2.1 GM/DL (ref 1.8–3.5)
GLUCOSE BLD-MCNC: 167 MG/DL (ref 74–124)
HCT VFR BLD AUTO: 40.4 % (ref 37.5–51)
HGB BLD-MCNC: 13.8 G/DL (ref 13–17.7)
IMM GRANULOCYTES # BLD AUTO: 0.03 10*3/MM3 (ref 0–0.05)
IMM GRANULOCYTES NFR BLD AUTO: 0.6 % (ref 0–0.5)
LYMPHOCYTES # BLD AUTO: 1.93 10*3/MM3 (ref 0.7–3.1)
LYMPHOCYTES NFR BLD AUTO: 35.5 % (ref 19.6–45.3)
MCH RBC QN AUTO: 31.9 PG (ref 26.6–33)
MCHC RBC AUTO-ENTMCNC: 34.2 G/DL (ref 31.5–35.7)
MCV RBC AUTO: 93.3 FL (ref 79–97)
MONOCYTES # BLD AUTO: 0.84 10*3/MM3 (ref 0.1–0.9)
MONOCYTES NFR BLD AUTO: 15.5 % (ref 5–12)
NEUTROPHILS # BLD AUTO: 2.4 10*3/MM3 (ref 1.7–7)
NEUTROPHILS NFR BLD AUTO: 44.2 % (ref 42.7–76)
NRBC BLD AUTO-RTO: 0 /100 WBC (ref 0–0.2)
PLATELET # BLD AUTO: 119 10*3/MM3 (ref 140–450)
PMV BLD AUTO: 10.3 FL (ref 6–12)
POTASSIUM BLD-SCNC: 4.3 MMOL/L (ref 3.5–4.7)
PROT SERPL-MCNC: 6.5 G/DL (ref 6.3–8)
RBC # BLD AUTO: 4.33 10*6/MM3 (ref 4.14–5.8)
SODIUM BLD-SCNC: 142 MMOL/L (ref 134–145)
WBC NRBC COR # BLD: 5.43 10*3/MM3 (ref 3.4–10.8)

## 2019-12-06 PROCEDURE — 99213 OFFICE O/P EST LOW 20 MIN: CPT | Performed by: NURSE PRACTITIONER

## 2019-12-06 PROCEDURE — 25010000002 RITUXIMAB 10 MG/ML SOLUTION 10 ML VIAL: Performed by: NURSE PRACTITIONER

## 2019-12-06 PROCEDURE — 25010000002 DIPHENHYDRAMINE PER 50 MG: Performed by: NURSE PRACTITIONER

## 2019-12-06 PROCEDURE — 85025 COMPLETE CBC W/AUTO DIFF WBC: CPT

## 2019-12-06 PROCEDURE — 36415 COLL VENOUS BLD VENIPUNCTURE: CPT

## 2019-12-06 PROCEDURE — 96413 CHEMO IV INFUSION 1 HR: CPT | Performed by: NURSE PRACTITIONER

## 2019-12-06 PROCEDURE — 80053 COMPREHEN METABOLIC PANEL: CPT

## 2019-12-06 PROCEDURE — 96375 TX/PRO/DX INJ NEW DRUG ADDON: CPT | Performed by: NURSE PRACTITIONER

## 2019-12-06 PROCEDURE — 96415 CHEMO IV INFUSION ADDL HR: CPT | Performed by: NURSE PRACTITIONER

## 2019-12-06 PROCEDURE — 25010000002 HYDROCORTISONE SODIUM SUCCINATE 100 MG RECONSTITUTED SOLUTION: Performed by: NURSE PRACTITIONER

## 2019-12-06 PROCEDURE — 25010000002 RITUXIMAB 10 MG/ML SOLUTION 50 ML VIAL: Performed by: NURSE PRACTITIONER

## 2019-12-06 RX ORDER — SODIUM CHLORIDE 9 MG/ML
250 INJECTION, SOLUTION INTRAVENOUS ONCE
Status: CANCELLED | OUTPATIENT
Start: 2019-12-17

## 2019-12-06 RX ORDER — SODIUM CHLORIDE 9 MG/ML
250 INJECTION, SOLUTION INTRAVENOUS ONCE
Status: COMPLETED | OUTPATIENT
Start: 2019-12-06 | End: 2019-12-06

## 2019-12-06 RX ORDER — ACETAMINOPHEN 325 MG/1
650 TABLET ORAL ONCE
Status: CANCELLED | OUTPATIENT
Start: 2019-12-17

## 2019-12-06 RX ORDER — DIPHENHYDRAMINE HYDROCHLORIDE 50 MG/ML
50 INJECTION INTRAMUSCULAR; INTRAVENOUS AS NEEDED
Status: CANCELLED | OUTPATIENT
Start: 2019-12-17

## 2019-12-06 RX ORDER — FAMOTIDINE 20 MG/1
20 TABLET, FILM COATED ORAL ONCE
Status: CANCELLED | OUTPATIENT
Start: 2019-12-17

## 2019-12-06 RX ORDER — FAMOTIDINE 20 MG/1
20 TABLET, FILM COATED ORAL ONCE
Status: COMPLETED | OUTPATIENT
Start: 2019-12-06 | End: 2019-12-06

## 2019-12-06 RX ORDER — FAMOTIDINE 10 MG/ML
20 INJECTION, SOLUTION INTRAVENOUS AS NEEDED
Status: CANCELLED | OUTPATIENT
Start: 2019-12-17

## 2019-12-06 RX ORDER — ACETAMINOPHEN 325 MG/1
650 TABLET ORAL ONCE
Status: COMPLETED | OUTPATIENT
Start: 2019-12-06 | End: 2019-12-06

## 2019-12-06 RX ORDER — MEPERIDINE HYDROCHLORIDE 50 MG/ML
25 INJECTION INTRAMUSCULAR; INTRAVENOUS; SUBCUTANEOUS
Status: CANCELLED | OUTPATIENT
Start: 2019-12-17 | End: 2019-12-18

## 2019-12-06 RX ADMIN — FAMOTIDINE 20 MG: 20 TABLET ORAL at 10:40

## 2019-12-06 RX ADMIN — DIPHENHYDRAMINE HYDROCHLORIDE 25 MG: 50 INJECTION INTRAMUSCULAR; INTRAVENOUS at 10:43

## 2019-12-06 RX ADMIN — ACETAMINOPHEN 650 MG: 325 TABLET ORAL at 10:40

## 2019-12-06 RX ADMIN — HYDROCORTISONE SODIUM SUCCINATE 100 MG: 100 INJECTION, POWDER, FOR SOLUTION INTRAMUSCULAR; INTRAVENOUS at 10:40

## 2019-12-06 RX ADMIN — RITUXIMAB 800 MG: 10 INJECTION, SOLUTION INTRAVENOUS at 12:24

## 2019-12-06 RX ADMIN — SODIUM CHLORIDE 250 ML: 9 INJECTION, SOLUTION INTRAVENOUS at 10:40

## 2019-12-06 NOTE — PROGRESS NOTES
Subjective .    REASONS FOR FOLLOWUP: Marginal zone lymphoma STAGE IV    HISTORY OF PRESENT ILLNESS: The patient is a 79-year-old male with the above-mentioned history returns today for reevaluation.  He was given a break from his maintenance Rituxan due to development of lymphocytosis.  Patient reports that he has been feeling quite well.  He denies fevers, chills, night sweats.  He denies any palpable adenopathy.  He denies any issues with shortness of breath or cough.  No nausea, vomiting, diarrhea, or constipation.  No skin rashes or other concerns today.  He will be leaving 1 week from Saturday to go to [place] until April.     Past Medical History:   Diagnosis Date   • Abnormal ECG    • BPH (benign prostatic hyperplasia)    • Chronic kidney disease    • Disease of thyroid gland    • Hyperlipidemia    • Hyperparathyroidism (CMS/HCC) 2017    parathyroidectomy   • Hypertension    • Nicotine dependence    • TB (pulmonary tuberculosis)        ONCOLOGIC HISTORY:  (History from previous dates can be found in the separate document.)    Current Outpatient Medications on File Prior to Visit   Medication Sig Dispense Refill   • acyclovir (ZOVIRAX) 400 MG tablet Take 1 tablet by mouth Daily. 60 tablet 5   • amLODIPine (NORVASC) 10 MG tablet TAKE 1 TABLET BY MOUTH EVERY DAY 90 tablet 0   • Ascorbic Acid (VITAMIN C PO) Take  by mouth.     • aspirin 81 MG tablet Take 81 mg by mouth.     • furosemide (LASIX) 20 MG tablet Take 20 mg by mouth Daily.     • HYDRALAZINE HCL PO Take 25 mg by mouth Daily.     • losartan (COZAAR) 100 MG tablet Take 100 mg by mouth Daily.     • Multiple Vitamins-Minerals (MULTIVITAL-M) tablet Take  by mouth Daily.     • Omega-3 Fatty Acids (FISH OIL) 1000 MG capsule capsule Take 1,000 mg by mouth Daily With Breakfast.     • ondansetron (ZOFRAN) 8 MG tablet Take 1 tablet by mouth 3 (Three) Times a Day As Needed for Nausea or Vomiting. 30 tablet 5   • VITAMIN E PO Take  by mouth.     • [DISCONTINUED]  METOPROLOL TARTRATE PO Take 25 mg by mouth Daily.     • allopurinol (ZYLOPRIM) 300 MG tablet TAKE 1 TABLET BY MOUTH DAILY 90 tablet 1   • allopurinol (ZYLOPRIM) 300 MG tablet Take 0.5 tablets by mouth Daily. 15 tablet 0     No current facility-administered medications on file prior to visit.        ALLERGIES:     Allergies   Allergen Reactions   • Codeine Unknown - Low Severity     UNKNOWN         Social History     Socioeconomic History   • Marital status:      Spouse name: Not on file   • Number of children: 2   • Years of education: High school   • Highest education level: High school graduate   Occupational History     Employer: RETIRED   Social Needs   • Financial resource strain: Not hard at all   • Food insecurity:     Worry: Never true     Inability: Never true   • Transportation needs:     Medical: No     Non-medical: Not on file   Tobacco Use   • Smoking status: Current Every Day Smoker     Packs/day: 0.50     Years: 60.00     Pack years: 30.00     Types: Cigarettes   • Smokeless tobacco: Never Used   Substance and Sexual Activity   • Alcohol use: Yes     Alcohol/week: 1.2 oz     Types: 2 Cans of beer per week     Frequency: 2-4 times a month     Drinks per session: 1 or 2     Binge frequency: Never     Comment: 2-3 beers 1-2 days a week   • Drug use: No     Comment: 2 cups coffee daily   • Sexual activity: Defer     Partners: Female         Cancer-related family history includes Cancer in his father.     Review of Systems   Constitutional: Negative for appetite change, chills, fatigue, fever and unexpected weight change.   HENT:   Negative for mouth sores, nosebleeds, sore throat and trouble swallowing.    Respiratory: Negative for cough and shortness of breath.    Cardiovascular: Negative for chest pain and leg swelling.   Gastrointestinal: Negative for abdominal pain, constipation, diarrhea, nausea and vomiting.   Endocrine: Negative for hot flashes.   Genitourinary: Negative for difficulty  "urinating.    Musculoskeletal: Negative for arthralgias and myalgias.   Skin: Negative for rash.   Neurological: Negative for dizziness and extremity weakness.   Hematological: Negative for adenopathy. Does not bruise/bleed easily.   Psychiatric/Behavioral: Negative for sleep disturbance.       Objective      Vitals:    12/06/19 0948   BP: 168/71   Pulse: 56   Resp: 16   Temp: 97.8 °F (36.6 °C)   TempSrc: Oral   SpO2: 98%   Weight: 105 kg (230 lb 6.4 oz)   Height: 179.4 cm (70.63\")   PainSc: 0-No pain     Current Status 12/6/2019   ECOG score 0     Physical Exam   Constitutional: He is oriented to person, place, and time. He appears well-developed and well-nourished. No distress.   HENT:   Head: Normocephalic and atraumatic.   Mouth/Throat: Oropharynx is clear and moist and mucous membranes are normal. No oropharyngeal exudate.   Eyes: Pupils are equal, round, and reactive to light.   Neck: Normal range of motion.   Cardiovascular: Normal rate, regular rhythm and normal heart sounds.   No murmur heard.  Pulmonary/Chest: Effort normal and breath sounds normal. No respiratory distress. He has no wheezes. He has no rhonchi. He has no rales.   Abdominal: Soft. Normal appearance and bowel sounds are normal. He exhibits no distension. There is no hepatosplenomegaly.   Musculoskeletal: Normal range of motion. He exhibits no edema.   Lymphadenopathy:     He has no cervical adenopathy.        Right: No supraclavicular adenopathy present.        Left: No supraclavicular adenopathy present.   Neurological: He is alert and oriented to person, place, and time.   Skin: Skin is warm and dry. No rash noted.   Psychiatric: He has a normal mood and affect.   Vitals reviewed.      RECENT LABS:  Results from last 7 days   Lab Units 12/06/19  0859   WBC 10*3/mm3 5.43   NEUTROS ABS 10*3/mm3 2.40   HEMOGLOBIN g/dL 13.8   HEMATOCRIT % 40.4   PLATELETS 10*3/mm3 119*     Results from last 7 days   Lab Units 12/06/19  0859   SODIUM mmol/L 142 "   POTASSIUM mmol/L 4.3   CHLORIDE mmol/L 103   CO2 mmol/L 24.7   BUN mg/dL 21*   CREATININE mg/dL 1.53*   CALCIUM mg/dL 9.3   ALBUMIN g/dL 4.40   BILIRUBIN mg/dL 0.3   ALK PHOS U/L 80   ALT (SGPT) U/L 28   AST (SGOT) U/L 24   GLUCOSE mg/dL 167*           Assessment/Plan    1. Marginal zone lymphoma.  · Peripheral blood flow cytometry CD5/CD10/CD23 negative; positive for p53 and 13 Q deletion, negative for KAREN and trisomy 12.  · Bone marrow evaluation demonstrated involvement by low-grade B-cell lymphoma classified again as marginal zone lymphoma occupying 75% of the total marrow.  · Patient has no B symptoms, no bulky disease.    · Significant leukocytosis with WBC count for example today being 168,000.  · Patient already taking allopurinol 300 mg daily.  · Bendamustine/Rituxan initiated, 7/23/2019.  After receiving only 7 mL's of Rituxan the patient had a reaction, specifically he developed initially feelings of warmth and nausea.  He was given additional 20mg of IV Pepcid.  Patient then began reporting changes in vision as well as diaphoresis, and near syncopal.  He was given additional 200 mg of Solu-Cortef.  The patient was monitored.  We decided not to give further Rituxan that day, but he was brought back on day #2 and was able to complete his Rituxan without difficulty.  · 8/20/2019 due for cycle 2 months bendamustine /Rituxan, delayed due to slightly elevated creatinine 1.9 patient received IV hydration.  Improvement in absolute lymphocyte count to 39.11.   · Cycle 2 given 8/26/2019.    The patient completed his bendamustine/Rituxan therapy and now he is receiving Rituxan for 3 more sessions 2nd one today until he goes to [place] by the end of the year. The patient so far has not had any B symptoms, no peripheral adenopathy, no hepatosplenomegaly, no pruritus and no other alteration related to his marginal cell non-Hodgkin's lymphoma. His peripheral blood flow cytometry on 09/24/2019 was very much negative  for monoclonal population of cells.   11/19/2019 has now developed peripheral lymphocytosis. We are in the process of reviewing his peripheral blood. This raises the question if there is a component of resistance to his primary disease, his lymphoma to the regimen of chemotherapy medicines that he has received including bendamustine and Rituxan. I do believe that the patient needs to have a little bit of time out in regard to his treatment, not because of side effects or how bad he feels, actually he feels terrific. It is the concern about peripheral lymphocytosis.   Flow cytometry done 11/19/2019 and negative for lymphoma cells.  Patient returns 12/6/2019 with improvement in his lymphocytosis.  I discussed with Dr. Herrera, and we will proceed with a Rituxan infusion today.  He is leaving town to go to [place] in a little over a week.  We recommend he have a CBC checked monthly and fax to our office.  We will see him back when he returns from [place].        PLAN:  1. Proceed with Rituxan today.  2. Patient given an order to have a monthly CBC done at a local hospital in [place] and faxed to our office.  3. Return for follow-up visit Dr. Herrera when he returns in April with repeat labs.          ASHELY Byrne

## 2019-12-09 RX ORDER — AMLODIPINE BESYLATE 10 MG/1
TABLET ORAL
Qty: 30 TABLET | Refills: 0 | Status: SHIPPED | OUTPATIENT
Start: 2019-12-09 | End: 2021-02-05 | Stop reason: SDUPTHER

## 2020-04-03 ENCOUNTER — TELEPHONE (OUTPATIENT)
Dept: ONCOLOGY | Facility: CLINIC | Age: 80
End: 2020-04-03

## 2020-04-03 RX ORDER — AMLODIPINE BESYLATE 10 MG/1
TABLET ORAL
Qty: 90 TABLET | Refills: 0 | OUTPATIENT
Start: 2020-04-03

## 2020-04-03 NOTE — TELEPHONE ENCOUNTER
PT WOULD LIKE TO MOVE HIS 4/10/20 LAB & DR HOPSON 5 MTH F/U APPT TO THE WEEK OF 4/20/20. HE IS IN [place] AND CAN'T MAKE IT IN TIME.  ANY DAY OF THE WEEK OR TIME WOULD BE FINE.    PLEASE GIVE KARINA A CALL [phone].

## 2020-04-08 ENCOUNTER — TELEPHONE (OUTPATIENT)
Dept: ONCOLOGY | Facility: CLINIC | Age: 80
End: 2020-04-08

## 2020-04-08 NOTE — TELEPHONE ENCOUNTER
Spoke to patient and he stated he will call back for instruction on my epic chart. He was not sure of doing the video visit.

## 2020-04-08 NOTE — TELEPHONE ENCOUNTER
----- Message from Hannah Weems RN sent at 4/3/2020  1:02 PM EDT -----  Regarding: FW: VIDEO VISIT 4/28  Dr. Herrera video visit 4/28. Active MyChart. Thanks!  ----- Message -----  From: Radha Chang  Sent: 4/3/2020  12:50 PM EDT  To: Hannah Weems RN  Subject: FW: VIDEO VISIT 4/28                             ACTIVE MYCHART  ----- Message -----  From: Jennie Nice RegSched Rep  Sent: 4/3/2020  12:37 PM EDT  To: Radha Chang  Subject: VIDEO VISIT 4/28                                 JENNIE ARANA

## 2020-04-10 ENCOUNTER — APPOINTMENT (OUTPATIENT)
Dept: ONCOLOGY | Facility: HOSPITAL | Age: 80
End: 2020-04-10

## 2020-04-10 ENCOUNTER — APPOINTMENT (OUTPATIENT)
Dept: LAB | Facility: HOSPITAL | Age: 80
End: 2020-04-10

## 2020-04-23 ENCOUNTER — APPOINTMENT (OUTPATIENT)
Dept: LAB | Facility: HOSPITAL | Age: 80
End: 2020-04-23

## 2020-04-30 ENCOUNTER — LAB (OUTPATIENT)
Dept: LAB | Facility: HOSPITAL | Age: 80
End: 2020-04-30

## 2020-04-30 DIAGNOSIS — C83.08 SMALL B-CELL LYMPHOMA OF LYMPH NODES OF MULTIPLE REGIONS (HCC): ICD-10-CM

## 2020-04-30 LAB
BASOPHILS # BLD AUTO: 0.05 10*3/MM3 (ref 0–0.2)
BASOPHILS NFR BLD AUTO: 0.8 % (ref 0–1.5)
DEPRECATED RDW RBC AUTO: 42.2 FL (ref 37–54)
EOSINOPHIL # BLD AUTO: 0.15 10*3/MM3 (ref 0–0.4)
EOSINOPHIL NFR BLD AUTO: 2.5 % (ref 0.3–6.2)
ERYTHROCYTE [DISTWIDTH] IN BLOOD BY AUTOMATED COUNT: 12.2 % (ref 12.3–15.4)
HCT VFR BLD AUTO: 40.4 % (ref 37.5–51)
HGB BLD-MCNC: 13.4 G/DL (ref 13–17.7)
IMM GRANULOCYTES # BLD AUTO: 0.02 10*3/MM3 (ref 0–0.05)
IMM GRANULOCYTES NFR BLD AUTO: 0.3 % (ref 0–0.5)
LYMPHOCYTES # BLD AUTO: 2.37 10*3/MM3 (ref 0.7–3.1)
LYMPHOCYTES NFR BLD AUTO: 39.2 % (ref 19.6–45.3)
MCH RBC QN AUTO: 30.9 PG (ref 26.6–33)
MCHC RBC AUTO-ENTMCNC: 33.2 G/DL (ref 31.5–35.7)
MCV RBC AUTO: 93.1 FL (ref 79–97)
MONOCYTES # BLD AUTO: 0.76 10*3/MM3 (ref 0.1–0.9)
MONOCYTES NFR BLD AUTO: 12.6 % (ref 5–12)
NEUTROPHILS # BLD AUTO: 2.69 10*3/MM3 (ref 1.7–7)
NEUTROPHILS NFR BLD AUTO: 44.6 % (ref 42.7–76)
NRBC BLD AUTO-RTO: 0 /100 WBC (ref 0–0.2)
PLATELET # BLD AUTO: 170 10*3/MM3 (ref 140–450)
PMV BLD AUTO: 10.6 FL (ref 6–12)
RBC # BLD AUTO: 4.34 10*6/MM3 (ref 4.14–5.8)
WBC NRBC COR # BLD: 6.04 10*3/MM3 (ref 3.4–10.8)

## 2020-04-30 PROCEDURE — 85025 COMPLETE CBC W/AUTO DIFF WBC: CPT

## 2020-05-01 ENCOUNTER — TELEMEDICINE (OUTPATIENT)
Dept: ONCOLOGY | Facility: CLINIC | Age: 80
End: 2020-05-01

## 2020-05-01 DIAGNOSIS — N18.30 STAGE 3 CHRONIC KIDNEY DISEASE (HCC): ICD-10-CM

## 2020-05-01 DIAGNOSIS — N40.1 ENLARGED PROSTATE WITH URINARY OBSTRUCTION: ICD-10-CM

## 2020-05-01 DIAGNOSIS — C83.08 SMALL B-CELL LYMPHOMA OF LYMPH NODES OF MULTIPLE REGIONS (HCC): Primary | ICD-10-CM

## 2020-05-01 DIAGNOSIS — N13.8 ENLARGED PROSTATE WITH URINARY OBSTRUCTION: ICD-10-CM

## 2020-05-01 PROCEDURE — 99441 PR PHYS/QHP TELEPHONE EVALUATION 5-10 MIN: CPT | Performed by: INTERNAL MEDICINE

## 2020-05-01 NOTE — PROGRESS NOTES
Subjective .    REASONS FOR FOLLOWUP: Marginal zone lymphoma STAGE IV    HISTORY OF PRESENT ILLNESS:  I HAVE CALLED THIS PATIENT ON THE PHONE TODAY AND VERBALLY HAS CONSENTED ABOUT TELEMEDICINE VISIT .   PATIENT WAS CALLED THE DAY BEFORE BY THE OFFICE TO ASK FOR SYMPTOMS THAT COULD BE CONSISTENT WITH CORONAVIRUS INFECTION, AND BEING NEGATIVE WAS SCHEDULED TO BE SEEN IN THE OFFICE FOR LAB YESTERDAY SIMILAR QUESTIONING TODAY INCLUDING, CHILLS, FEVER, NEW COUGH, SHORTNESS OF BREATH, DIARRHEA,DIFFUSE BODY ACHES  AND CHANGES IN SMELL OR TASTE WERE NEGATIVE.  I received a message in the last few days being directed from [facility]. In any event he has fallen accidentally from a ladder in [place] and he had a bruised ankle but no fracture. Other than that he is not having any fever, chills, sweats, pruritus or adenopathy of any nature. His urination is appropriate with what is usual. Bowel function normal. He continues having frequent urination given his enlarged prostate. He has not had any neurological symptomatology. He is well, strong and able to do anything that he pleases with no limitations. The county where he was in [place] for almost 4 months not too many patients had coronavirus infection. He had the proper precautions to minimize infection and he has self quarantined at home with no issues pertinent to fever, infection or cough.                 Past Medical History:   Diagnosis Date   • Abnormal ECG    • BPH (benign prostatic hyperplasia)    • Chronic kidney disease    • Disease of thyroid gland    • Hyperlipidemia    • Hyperparathyroidism (CMS/HCC) 2017    parathyroidectomy   • Hypertension    • Nicotine dependence    • TB (pulmonary tuberculosis)        ONCOLOGIC HISTORY:  (History from previous dates can be found in the separate document.)    Current Outpatient Medications on File Prior to Visit   Medication Sig Dispense Refill   • acyclovir (ZOVIRAX) 400 MG tablet Take 1 tablet by mouth Daily. 60  tablet 5   • allopurinol (ZYLOPRIM) 300 MG tablet TAKE 1 TABLET BY MOUTH DAILY 90 tablet 1   • allopurinol (ZYLOPRIM) 300 MG tablet Take 0.5 tablets by mouth Daily. 15 tablet 0   • amLODIPine (NORVASC) 10 MG tablet TAKE 1 TABLET BY MOUTH EVERY DAY 90 tablet 0   • amLODIPine (NORVASC) 10 MG tablet TAKE 1 TABLET BY MOUTH EVERY DAY 30 tablet 0   • Ascorbic Acid (VITAMIN C PO) Take  by mouth.     • aspirin 81 MG tablet Take 81 mg by mouth.     • furosemide (LASIX) 20 MG tablet Take 20 mg by mouth Daily.     • HYDRALAZINE HCL PO Take 25 mg by mouth Daily.     • losartan (COZAAR) 100 MG tablet Take 100 mg by mouth Daily.     • Multiple Vitamins-Minerals (MULTIVITAL-M) tablet Take  by mouth Daily.     • Omega-3 Fatty Acids (FISH OIL) 1000 MG capsule capsule Take 1,000 mg by mouth Daily With Breakfast.     • ondansetron (ZOFRAN) 8 MG tablet Take 1 tablet by mouth 3 (Three) Times a Day As Needed for Nausea or Vomiting. 30 tablet 5   • VITAMIN E PO Take  by mouth.       No current facility-administered medications on file prior to visit.        ALLERGIES:     Allergies   Allergen Reactions   • Codeine Unknown - Low Severity     UNKNOWN         Social History     Socioeconomic History   • Marital status:      Spouse name: Not on file   • Number of children: 2   • Years of education: High school   • Highest education level: High school graduate   Occupational History     Employer: RETIRED   Social Needs   • Financial resource strain: Not hard at all   • Food insecurity:     Worry: Never true     Inability: Never true   • Transportation needs:     Medical: No     Non-medical: Not on file   Tobacco Use   • Smoking status: Current Every Day Smoker     Packs/day: 0.50     Years: 60.00     Pack years: 30.00     Types: Cigarettes   • Smokeless tobacco: Never Used   Substance and Sexual Activity   • Alcohol use: Yes     Alcohol/week: 2.0 standard drinks     Types: 2 Cans of beer per week     Frequency: 2-4 times a month      Drinks per session: 1 or 2     Binge frequency: Never     Comment: 2-3 beers 1-2 days a week   • Drug use: No     Comment: 2 cups coffee daily   • Sexual activity: Defer     Partners: Female         Cancer-related family history includes Cancer in his father.           Review of Systems:      General: no fever, no chills, no fatigue,no weight changes, no lack of appetite.  Eyes: no epiphora, xerophthalmia,conjunctivitis, pain, glaucoma, blurred vision, blindness, secretion, photophobia, proptosis, diplopia.  Ears: no otorrhea, tinnitus, otorrhagia, deafness, pain, vertigo.  Nose: no rhinorrhea, no epistaxis, no alteration in perception of odors, no sinuses pressure.  Mouth: no alteration in gums or teeth,  No ulcers, no difficulty with mastication or deglut ion, no odynophagia.  Neck: no masses or pain, no thyroid alterations, no pain in muscles or arteries, no carotid odynia, no crepitation.  Respiratory: no cough, no sputum production,no dyspnea,no trepopnea, no pleuritic pain,no hemoptysis.  Heart: no syncope, no irregularity, no palpitations, no angina,no orthopnea,no paroxysmal nocturnal dyspnea.  Vascular Venous: no tenderness,no edema,no palpable cords,no postphlebitic syndrome, no skin changes no ulcerations.  Vascular Arterial: no distal ischemia, noclaudication, no gangrene, no neuropathic ischemic pain, no skin ulcers, no paleness no cyanosis.  GI: no dysphagia, no odynophagia, no regurgitation, no heartburn,no indigestion,no nausea,no vomiting,no hematemesis ,no melena,no jaundice,no distention, no obstipation,no enterorrhagia,no proctalgia,no anal  lesions, no changes in bowel habits.  : STATED frequency, no hesitancy, no hematuria, no discharge,no  pain.  Musculoskeletal: no muscle or tendon pain or inflammation,no  joint pain, no edema, no functional limitation,no fasciculations, no mass.  Neurologic: no headache, no seizures, noalterations on Craneal nerves, no motor deficit, no sensory deficit,  normal coordination, no alteration in memory,normal orientation, calculation,normal writting, verbal and written language.  Skin: no rashes,no pruritus no localized lesions.  Psychiatric: no anxiety, no depression,no agitation, no delusions, proper insight.        Objective      There were no vitals filed for this visit.  Current Status 12/6/2019   ECOG score 0     Physical Exam: He sounds on the telephone today. He verbalizes no pain, he has normal activities of daily living, he is driving the car, buying groceries, taking care of the house. His wife is undergoing active chemotherapy for her condition at this time and he is taking care of her as well.                               RECENT LABS:  Results from last 7 days   Lab Units 04/30/20  1202   WBC 10*3/mm3 6.04   NEUTROS ABS 10*3/mm3 2.69   HEMOGLOBIN g/dL 13.4   HEMATOCRIT % 40.4   PLATELETS 10*3/mm3 170               Assessment/Plan    1. Marginal zone lymphoma.  · Peripheral blood flow cytometry CD5/CD10/CD23 negative; positive for p53 and 13 Q deletion, negative for KAREN and trisomy 12.  · Bone marrow evaluation demonstrated involvement by low-grade B-cell lymphoma classified again as marginal zone lymphoma occupying 75% of the total marrow.  · Patient has no B symptoms, no bulky disease.    · Significant leukocytosis with WBC count for example today being 168,000.  · Patient already taking allopurinol 300 mg daily.  · Bendamustine/Rituxan initiated, 7/23/2019.  After receiving only 7 mL's of Rituxan the patient had a reaction, specifically he developed initially feelings of warmth and nausea.  He was given additional 20mg of IV Pepcid.  Patient then began reporting changes in vision as well as diaphoresis, and near syncopal.  He was given additional 200 mg of Solu-Cortef.  The patient was monitored.  We decided not to give further Rituxan that day, but he was brought back on day #2 and was able to complete his Rituxan without difficulty.  · 8/20/2019 due  for cycle 2 months bendamustine /Rituxan, delayed due to slightly elevated creatinine 1.9 patient received IV hydration.  Improvement in absolute lymphocyte count to 39.11.   · Cycle 2 given 8/26/2019.    The patient completed his bendamustine/Rituxan therapy and now he is receiving Rituxan for 3 more sessions 2nd one today until he goes to [place] by the end of the year. The patient so far has not had any B symptoms, no peripheral adenopathy, no hepatosplenomegaly, no pruritus and no other alteration related to his marginal cell non-Hodgkin's lymphoma. His peripheral blood flow cytometry on 09/24/2019 was very much negative for monoclonal population of cells.       The patient was further reviewed on 11/19/2019. His physical exam is unremarkable, he had no peripheral adenopathy, hepatosplenomegaly, B symptoms. His white count is 4000, the hemoglobin is 14.1, the platelet count has further normalized. The only thing new though is that he has now developed peripheral lymphocytosis. We are in the process of reviewing his peripheral blood. This raises the question if there is a component of resistance to his primary disease, his lymphoma to the regimen of chemotherapy medicines that he has received including bendamustine and Rituxan.     The patient had an telephone today instead of a video visit because of technical difficulties. In any event the patient verbalizes that he feels well, that he had a good time in [place], he had no contact with people with coronavirus infection and the amount of patients affected with this in the county where he was was very small. He had proper precautions.     I reviewed his white count, hemoglobin, platelets and white count differential done yesterday in the office and those numbers look terrific. His peripheral lymphocytosis has resolved altogether. Given the circumstances I do believe that the patient will be fine to resume his maintenance Rituxan and I asked him to come to the  office next week review with me and proceed with an infusion. He will remain on Rituxan every 3 months. I find no need for radiological assessment on him at this time. Next week we will check his chemistry profile to monitor his creatinine as well.     Otherwise no other indication of recurrence of progressive lymphoma, small cell lymphocytic lymphoma, CLL.     TELEPHONE VISIT DURATION 10 MINUTES.    I DISCUSSED WITH PATIENT IN DETAIL FORMS TO DECREASE CHANCES OF CORONAVIRUS INFECTION INCLUDING ISOLATION, PROPER HAND HIGIENE, AVOID PUBLIC PLACES  WITH CROWDS, FOLLOW  CDC RECOMENDATIONS, AND KEEP PERSONAL AND SOCIAL RESPONSIBILITY, WARE A MASK IN PUBLIC PLACES.  PATIENT IS AWARE THIS INFECTION COULD HAVE SEVERE CONSEQUENCES TO PERSONAL HEALTH AND FAMILY RAMIFICATIONS OF THIS.      Vic Herrera MD            Cc:  No ref. provider found

## 2020-05-08 ENCOUNTER — INFUSION (OUTPATIENT)
Dept: ONCOLOGY | Facility: HOSPITAL | Age: 80
End: 2020-05-08

## 2020-05-08 VITALS
WEIGHT: 234 LBS | TEMPERATURE: 98.1 F | DIASTOLIC BLOOD PRESSURE: 70 MMHG | OXYGEN SATURATION: 98 % | BODY MASS INDEX: 32.98 KG/M2 | HEART RATE: 64 BPM | SYSTOLIC BLOOD PRESSURE: 160 MMHG

## 2020-05-08 DIAGNOSIS — N40.1 ENLARGED PROSTATE WITH URINARY OBSTRUCTION: ICD-10-CM

## 2020-05-08 DIAGNOSIS — N18.30 STAGE 3 CHRONIC KIDNEY DISEASE (HCC): ICD-10-CM

## 2020-05-08 DIAGNOSIS — C83.08 SMALL B-CELL LYMPHOMA OF LYMPH NODES OF MULTIPLE REGIONS (HCC): Primary | ICD-10-CM

## 2020-05-08 DIAGNOSIS — N13.8 ENLARGED PROSTATE WITH URINARY OBSTRUCTION: ICD-10-CM

## 2020-05-08 LAB
ALBUMIN SERPL-MCNC: 4.6 G/DL (ref 3.5–5.2)
ALBUMIN/GLOB SERPL: 1.9 G/DL (ref 1.1–2.4)
ALP SERPL-CCNC: 89 U/L (ref 38–116)
ALT SERPL W P-5'-P-CCNC: 21 U/L (ref 0–41)
ANION GAP SERPL CALCULATED.3IONS-SCNC: 13.4 MMOL/L (ref 5–15)
AST SERPL-CCNC: 21 U/L (ref 0–40)
BASOPHILS # BLD AUTO: 0.05 10*3/MM3 (ref 0–0.2)
BASOPHILS NFR BLD AUTO: 0.8 % (ref 0–1.5)
BILIRUB SERPL-MCNC: 0.5 MG/DL (ref 0.2–1.2)
BUN BLD-MCNC: 25 MG/DL (ref 6–20)
BUN/CREAT SERPL: 16.3 (ref 7.3–30)
CALCIUM SPEC-SCNC: 9.8 MG/DL (ref 8.5–10.2)
CHLORIDE SERPL-SCNC: 104 MMOL/L (ref 98–107)
CO2 SERPL-SCNC: 21.6 MMOL/L (ref 22–29)
CREAT BLD-MCNC: 1.53 MG/DL (ref 0.7–1.3)
DEPRECATED RDW RBC AUTO: 42 FL (ref 37–54)
EOSINOPHIL # BLD AUTO: 0.2 10*3/MM3 (ref 0–0.4)
EOSINOPHIL NFR BLD AUTO: 3.2 % (ref 0.3–6.2)
ERYTHROCYTE [DISTWIDTH] IN BLOOD BY AUTOMATED COUNT: 12.5 % (ref 12.3–15.4)
GFR SERPL CREATININE-BSD FRML MDRD: 44 ML/MIN/1.73
GLOBULIN UR ELPH-MCNC: 2.4 GM/DL (ref 1.8–3.5)
GLUCOSE BLD-MCNC: 147 MG/DL (ref 74–124)
HCT VFR BLD AUTO: 38.2 % (ref 37.5–51)
HGB BLD-MCNC: 13.1 G/DL (ref 13–17.7)
IMM GRANULOCYTES # BLD AUTO: 0.02 10*3/MM3 (ref 0–0.05)
IMM GRANULOCYTES NFR BLD AUTO: 0.3 % (ref 0–0.5)
LYMPHOCYTES # BLD AUTO: 2.62 10*3/MM3 (ref 0.7–3.1)
LYMPHOCYTES NFR BLD AUTO: 41.3 % (ref 19.6–45.3)
MCH RBC QN AUTO: 31.6 PG (ref 26.6–33)
MCHC RBC AUTO-ENTMCNC: 34.3 G/DL (ref 31.5–35.7)
MCV RBC AUTO: 92.3 FL (ref 79–97)
MONOCYTES # BLD AUTO: 0.84 10*3/MM3 (ref 0.1–0.9)
MONOCYTES NFR BLD AUTO: 13.2 % (ref 5–12)
NEUTROPHILS # BLD AUTO: 2.61 10*3/MM3 (ref 1.7–7)
NEUTROPHILS NFR BLD AUTO: 41.2 % (ref 42.7–76)
NRBC BLD AUTO-RTO: 0 /100 WBC (ref 0–0.2)
PLATELET # BLD AUTO: 153 10*3/MM3 (ref 140–450)
PMV BLD AUTO: 10.3 FL (ref 6–12)
POTASSIUM BLD-SCNC: 4 MMOL/L (ref 3.5–4.7)
PROT SERPL-MCNC: 7 G/DL (ref 6.3–8)
PSA SERPL-MCNC: 3.18 NG/ML (ref 0–4)
RBC # BLD AUTO: 4.14 10*6/MM3 (ref 4.14–5.8)
SODIUM BLD-SCNC: 139 MMOL/L (ref 134–145)
URATE SERPL-MCNC: 7.9 MG/DL (ref 2.8–7.4)
WBC NRBC COR # BLD: 6.34 10*3/MM3 (ref 3.4–10.8)

## 2020-05-08 PROCEDURE — 96415 CHEMO IV INFUSION ADDL HR: CPT

## 2020-05-08 PROCEDURE — 84550 ASSAY OF BLOOD/URIC ACID: CPT

## 2020-05-08 PROCEDURE — 25010000002 RITUXIMAB 10 MG/ML SOLUTION 50 ML VIAL: Performed by: NURSE PRACTITIONER

## 2020-05-08 PROCEDURE — 84153 ASSAY OF PSA TOTAL: CPT | Performed by: INTERNAL MEDICINE

## 2020-05-08 PROCEDURE — 96375 TX/PRO/DX INJ NEW DRUG ADDON: CPT

## 2020-05-08 PROCEDURE — 25010000002 HYDROCORTISONE SODIUM SUCCINATE 100 MG RECONSTITUTED SOLUTION: Performed by: NURSE PRACTITIONER

## 2020-05-08 PROCEDURE — 25010000002 DIPHENHYDRAMINE PER 50 MG: Performed by: NURSE PRACTITIONER

## 2020-05-08 PROCEDURE — 96413 CHEMO IV INFUSION 1 HR: CPT

## 2020-05-08 PROCEDURE — 25010000002 RITUXIMAB 10 MG/ML SOLUTION 10 ML VIAL: Performed by: NURSE PRACTITIONER

## 2020-05-08 PROCEDURE — 85025 COMPLETE CBC W/AUTO DIFF WBC: CPT

## 2020-05-08 PROCEDURE — 80053 COMPREHEN METABOLIC PANEL: CPT

## 2020-05-08 RX ORDER — DIPHENHYDRAMINE HYDROCHLORIDE 50 MG/ML
50 INJECTION INTRAMUSCULAR; INTRAVENOUS AS NEEDED
Status: CANCELLED | OUTPATIENT
Start: 2020-05-08

## 2020-05-08 RX ORDER — FAMOTIDINE 10 MG/ML
20 INJECTION, SOLUTION INTRAVENOUS AS NEEDED
Status: CANCELLED | OUTPATIENT
Start: 2020-05-08

## 2020-05-08 RX ORDER — FAMOTIDINE 20 MG/1
20 TABLET, FILM COATED ORAL ONCE
Status: COMPLETED | OUTPATIENT
Start: 2020-05-08 | End: 2020-05-08

## 2020-05-08 RX ORDER — ACETAMINOPHEN 325 MG/1
650 TABLET ORAL ONCE
Status: COMPLETED | OUTPATIENT
Start: 2020-05-08 | End: 2020-05-08

## 2020-05-08 RX ORDER — SODIUM CHLORIDE 9 MG/ML
250 INJECTION, SOLUTION INTRAVENOUS ONCE
Status: COMPLETED | OUTPATIENT
Start: 2020-05-08 | End: 2020-05-08

## 2020-05-08 RX ADMIN — SODIUM CHLORIDE 100 ML: 9 INJECTION, SOLUTION INTRAVENOUS at 09:24

## 2020-05-08 RX ADMIN — RITUXIMAB 800 MG: 10 INJECTION, SOLUTION INTRAVENOUS at 10:18

## 2020-05-08 RX ADMIN — DIPHENHYDRAMINE HYDROCHLORIDE 25 MG: 50 INJECTION INTRAMUSCULAR; INTRAVENOUS at 09:27

## 2020-05-08 RX ADMIN — HYDROCORTISONE SODIUM SUCCINATE 100 MG: 100 INJECTION, POWDER, FOR SOLUTION INTRAMUSCULAR; INTRAVENOUS at 09:25

## 2020-05-08 RX ADMIN — ACETAMINOPHEN 650 MG: 325 TABLET ORAL at 09:25

## 2020-05-08 RX ADMIN — FAMOTIDINE 20 MG: 20 TABLET ORAL at 09:25

## 2020-08-07 ENCOUNTER — INFUSION (OUTPATIENT)
Dept: ONCOLOGY | Facility: HOSPITAL | Age: 80
End: 2020-08-07

## 2020-08-07 ENCOUNTER — OFFICE VISIT (OUTPATIENT)
Dept: ONCOLOGY | Facility: CLINIC | Age: 80
End: 2020-08-07

## 2020-08-07 ENCOUNTER — LAB (OUTPATIENT)
Dept: LAB | Facility: HOSPITAL | Age: 80
End: 2020-08-07

## 2020-08-07 VITALS — SYSTOLIC BLOOD PRESSURE: 183 MMHG | HEART RATE: 49 BPM | DIASTOLIC BLOOD PRESSURE: 84 MMHG

## 2020-08-07 VITALS
OXYGEN SATURATION: 99 % | WEIGHT: 231.4 LBS | TEMPERATURE: 97.3 F | HEIGHT: 72 IN | RESPIRATION RATE: 21 BRPM | SYSTOLIC BLOOD PRESSURE: 194 MMHG | DIASTOLIC BLOOD PRESSURE: 83 MMHG | BODY MASS INDEX: 31.34 KG/M2 | HEART RATE: 52 BPM

## 2020-08-07 DIAGNOSIS — C83.08 SMALL B-CELL LYMPHOMA OF LYMPH NODES OF MULTIPLE REGIONS (HCC): ICD-10-CM

## 2020-08-07 DIAGNOSIS — C83.08 SMALL B-CELL LYMPHOMA OF LYMPH NODES OF MULTIPLE REGIONS (HCC): Primary | ICD-10-CM

## 2020-08-07 LAB
ALBUMIN SERPL-MCNC: 4.6 G/DL (ref 3.5–5.2)
ALBUMIN/GLOB SERPL: 1.8 G/DL (ref 1.1–2.4)
ALP SERPL-CCNC: 89 U/L (ref 38–116)
ALT SERPL W P-5'-P-CCNC: 21 U/L (ref 0–41)
ANION GAP SERPL CALCULATED.3IONS-SCNC: 12.7 MMOL/L (ref 5–15)
AST SERPL-CCNC: 22 U/L (ref 0–40)
BASOPHILS # BLD AUTO: 0.06 10*3/MM3 (ref 0–0.2)
BASOPHILS NFR BLD AUTO: 0.7 % (ref 0–1.5)
BILIRUB SERPL-MCNC: 0.3 MG/DL (ref 0.2–1.2)
BUN SERPL-MCNC: 28 MG/DL (ref 6–20)
BUN/CREAT SERPL: 15.9 (ref 7.3–30)
CALCIUM SPEC-SCNC: 9.9 MG/DL (ref 8.5–10.2)
CHLORIDE SERPL-SCNC: 105 MMOL/L (ref 98–107)
CO2 SERPL-SCNC: 23.3 MMOL/L (ref 22–29)
CREAT SERPL-MCNC: 1.76 MG/DL (ref 0.7–1.3)
DEPRECATED RDW RBC AUTO: 41.1 FL (ref 37–54)
EOSINOPHIL # BLD AUTO: 0.31 10*3/MM3 (ref 0–0.4)
EOSINOPHIL NFR BLD AUTO: 3.5 % (ref 0.3–6.2)
ERYTHROCYTE [DISTWIDTH] IN BLOOD BY AUTOMATED COUNT: 12.5 % (ref 12.3–15.4)
GFR SERPL CREATININE-BSD FRML MDRD: 38 ML/MIN/1.73
GLOBULIN UR ELPH-MCNC: 2.5 GM/DL (ref 1.8–3.5)
GLUCOSE SERPL-MCNC: 112 MG/DL (ref 74–124)
HCT VFR BLD AUTO: 42.5 % (ref 37.5–51)
HGB BLD-MCNC: 14.6 G/DL (ref 13–17.7)
IMM GRANULOCYTES # BLD AUTO: 0.08 10*3/MM3 (ref 0–0.05)
IMM GRANULOCYTES NFR BLD AUTO: 0.9 % (ref 0–0.5)
LYMPHOCYTES # BLD AUTO: 3.52 10*3/MM3 (ref 0.7–3.1)
LYMPHOCYTES NFR BLD AUTO: 40 % (ref 19.6–45.3)
MCH RBC QN AUTO: 31.3 PG (ref 26.6–33)
MCHC RBC AUTO-ENTMCNC: 34.4 G/DL (ref 31.5–35.7)
MCV RBC AUTO: 91 FL (ref 79–97)
MONOCYTES # BLD AUTO: 1.04 10*3/MM3 (ref 0.1–0.9)
MONOCYTES NFR BLD AUTO: 11.8 % (ref 5–12)
NEUTROPHILS NFR BLD AUTO: 3.78 10*3/MM3 (ref 1.7–7)
NEUTROPHILS NFR BLD AUTO: 43.1 % (ref 42.7–76)
NRBC BLD AUTO-RTO: 0 /100 WBC (ref 0–0.2)
PLATELET # BLD AUTO: 154 10*3/MM3 (ref 140–450)
PMV BLD AUTO: 11 FL (ref 6–12)
POTASSIUM SERPL-SCNC: 4.8 MMOL/L (ref 3.5–4.7)
PROT SERPL-MCNC: 7.1 G/DL (ref 6.3–8)
RBC # BLD AUTO: 4.67 10*6/MM3 (ref 4.14–5.8)
SODIUM SERPL-SCNC: 141 MMOL/L (ref 134–145)
WBC # BLD AUTO: 8.79 10*3/MM3 (ref 3.4–10.8)

## 2020-08-07 PROCEDURE — 85025 COMPLETE CBC W/AUTO DIFF WBC: CPT

## 2020-08-07 PROCEDURE — 96413 CHEMO IV INFUSION 1 HR: CPT

## 2020-08-07 PROCEDURE — 36415 COLL VENOUS BLD VENIPUNCTURE: CPT

## 2020-08-07 PROCEDURE — 25010000002 HYDROCORTISONE SODIUM SUCCINATE 100 MG RECONSTITUTED SOLUTION: Performed by: INTERNAL MEDICINE

## 2020-08-07 PROCEDURE — 25010000002 RITUXIMAB 10 MG/ML SOLUTION 10 ML VIAL: Performed by: INTERNAL MEDICINE

## 2020-08-07 PROCEDURE — 99214 OFFICE O/P EST MOD 30 MIN: CPT | Performed by: INTERNAL MEDICINE

## 2020-08-07 PROCEDURE — 25010000002 RITUXIMAB 10 MG/ML SOLUTION 50 ML VIAL: Performed by: INTERNAL MEDICINE

## 2020-08-07 PROCEDURE — 80053 COMPREHEN METABOLIC PANEL: CPT

## 2020-08-07 PROCEDURE — 25010000002 DIPHENHYDRAMINE PER 50 MG: Performed by: INTERNAL MEDICINE

## 2020-08-07 PROCEDURE — 96415 CHEMO IV INFUSION ADDL HR: CPT

## 2020-08-07 PROCEDURE — 96375 TX/PRO/DX INJ NEW DRUG ADDON: CPT

## 2020-08-07 RX ORDER — ACETAMINOPHEN 325 MG/1
650 TABLET ORAL ONCE
Status: CANCELLED | OUTPATIENT
Start: 2020-08-07

## 2020-08-07 RX ORDER — ACETAMINOPHEN 325 MG/1
650 TABLET ORAL ONCE
Status: COMPLETED | OUTPATIENT
Start: 2020-08-07 | End: 2020-08-07

## 2020-08-07 RX ORDER — FAMOTIDINE 10 MG/ML
20 INJECTION, SOLUTION INTRAVENOUS AS NEEDED
Status: CANCELLED | OUTPATIENT
Start: 2020-08-07

## 2020-08-07 RX ORDER — SODIUM CHLORIDE 9 MG/ML
250 INJECTION, SOLUTION INTRAVENOUS ONCE
Status: CANCELLED | OUTPATIENT
Start: 2020-08-07

## 2020-08-07 RX ORDER — FAMOTIDINE 20 MG/1
20 TABLET, FILM COATED ORAL ONCE
Status: COMPLETED | OUTPATIENT
Start: 2020-08-07 | End: 2020-08-07

## 2020-08-07 RX ORDER — DIPHENHYDRAMINE HYDROCHLORIDE 50 MG/ML
50 INJECTION INTRAMUSCULAR; INTRAVENOUS AS NEEDED
Status: CANCELLED | OUTPATIENT
Start: 2020-08-07

## 2020-08-07 RX ORDER — MEPERIDINE HYDROCHLORIDE 50 MG/ML
25 INJECTION INTRAMUSCULAR; INTRAVENOUS; SUBCUTANEOUS
Status: CANCELLED | OUTPATIENT
Start: 2020-08-07

## 2020-08-07 RX ORDER — SODIUM CHLORIDE 9 MG/ML
250 INJECTION, SOLUTION INTRAVENOUS ONCE
Status: COMPLETED | OUTPATIENT
Start: 2020-08-07 | End: 2020-08-07

## 2020-08-07 RX ORDER — FAMOTIDINE 20 MG/1
20 TABLET, FILM COATED ORAL ONCE
Status: CANCELLED | OUTPATIENT
Start: 2020-08-07

## 2020-08-07 RX ADMIN — RITUXIMAB 830 MG: 10 INJECTION, SOLUTION INTRAVENOUS at 10:29

## 2020-08-07 RX ADMIN — DIPHENHYDRAMINE HYDROCHLORIDE 25 MG: 50 INJECTION, SOLUTION INTRAMUSCULAR; INTRAVENOUS at 09:42

## 2020-08-07 RX ADMIN — SODIUM CHLORIDE 250 ML: 9 INJECTION, SOLUTION INTRAVENOUS at 09:35

## 2020-08-07 RX ADMIN — HYDROCORTISONE SODIUM SUCCINATE 100 MG: 100 INJECTION, POWDER, FOR SOLUTION INTRAMUSCULAR; INTRAVENOUS at 09:40

## 2020-08-07 RX ADMIN — ACETAMINOPHEN 650 MG: 325 TABLET ORAL at 09:39

## 2020-08-07 RX ADMIN — FAMOTIDINE 20 MG: 20 TABLET ORAL at 09:39

## 2020-08-07 NOTE — NURSING NOTE
Elevated BP, pt reports taking BP meds on the way to the office.  Reviewed with Dr Herrera, no orders received, okay to treat

## 2020-08-07 NOTE — PROGRESS NOTES
Subjective .    REASONS FOR FOLLOWUP: Marginal zone lymphoma STAGE IV    HISTORY OF PRESENT ILLNESS:  PATIENT WAS CALLED THE DAY BEFORE BY THE OFFICE TO ASK FOR SYMPTOMS THAT COULD BE CONSISTENT WITH CORONAVIRUS INFECTION, AND BEING NEGATIVE WAS SCHEDULED TO BE SEEN IN THE OFFICE TODAY. SIMILAR QUESTIONING TODAY INCLUDING, CHILLS, FEVER, NEW COUGH, SHORTNESS OF BREATH, DIARRHEA,DIFFUSE BODY ACHES  AND CHANGES IN SMELL OR TASTE WERE NEGATIVE.DURING THE VISIT WITH THE PATIENT TODAY , PATIENT HAD FACE MASK, I HAD PROPPER PROTECTIVE EQUIPMENT, AND I DID HAND HYGIENE WITH SOAP AND WATER BEFORE AND AFTER THE VISIT.    This patient returns today to the office for follow up. He was seen in the urgent care center this week because he stepped on a rake in the garden and had a wound in the right foot that required local cleanup and care as well as tetanus shot. He has not had any infection and he has been doing well with no other issues. Other than that his appetite has been great, his bowel function is normal, frequency to urinate and otherwise no fever, no chills, no sweats, no adenopathies, no rashes. He feels terrific. He is not taking precautions in regard prevention of coronavirus infection while playing golf with his friends. Please review below.               Past Medical History:   Diagnosis Date   • Abnormal ECG    • BPH (benign prostatic hyperplasia)    • Chronic kidney disease    • Disease of thyroid gland    • Hyperlipidemia    • Hyperparathyroidism (CMS/HCC) 2017    parathyroidectomy   • Hypertension    • Nicotine dependence    • TB (pulmonary tuberculosis)      Family History   Problem Relation Age of Onset   • Heart disease Mother    • Stroke Mother    • Diabetes Mother    • Hypertension Sister    • Diabetes Sister    • Cancer Father    • Leukemia Father      Social History     Socioeconomic History   • Marital status:      Spouse name: Not on file   • Number of children: 2   • Years of education: High  school   • Highest education level: High school graduate   Occupational History     Employer: RETIRED   Social Needs   • Financial resource strain: Not hard at all   • Food insecurity:     Worry: Never true     Inability: Never true   • Transportation needs:     Medical: No     Non-medical: Not on file   Tobacco Use   • Smoking status: Current Every Day Smoker     Packs/day: 0.50     Years: 60.00     Pack years: 30.00     Types: Cigarettes   • Smokeless tobacco: Never Used   Substance and Sexual Activity   • Alcohol use: Yes     Alcohol/week: 2.0 standard drinks     Types: 2 Cans of beer per week     Frequency: 2-4 times a month     Drinks per session: 1 or 2     Binge frequency: Never     Comment: 2-3 beers 1-2 days a week   • Drug use: No     Comment: 2 cups coffee daily   • Sexual activity: Defer     Partners: Female       ONCOLOGIC HISTORY:  (History from previous dates can be found in the separate document.)    Current Outpatient Medications on File Prior to Visit   Medication Sig Dispense Refill   • acyclovir (ZOVIRAX) 400 MG tablet Take 1 tablet by mouth Daily. 60 tablet 5   • allopurinol (ZYLOPRIM) 300 MG tablet TAKE 1 TABLET BY MOUTH DAILY 90 tablet 1   • amLODIPine (NORVASC) 10 MG tablet TAKE 1 TABLET BY MOUTH EVERY DAY 90 tablet 0   • amLODIPine (NORVASC) 10 MG tablet TAKE 1 TABLET BY MOUTH EVERY DAY 30 tablet 0   • Ascorbic Acid (VITAMIN C PO) Take  by mouth.     • aspirin 81 MG tablet Take 81 mg by mouth.     • furosemide (LASIX) 20 MG tablet Take 20 mg by mouth Daily.     • HYDRALAZINE HCL PO Take 25 mg by mouth Daily.     • losartan (COZAAR) 100 MG tablet Take 100 mg by mouth Daily.     • Multiple Vitamins-Minerals (MULTIVITAL-M) tablet Take  by mouth Daily.     • Omega-3 Fatty Acids (FISH OIL) 1000 MG capsule capsule Take 1,000 mg by mouth Daily With Breakfast.     • ondansetron (ZOFRAN) 8 MG tablet Take 1 tablet by mouth 3 (Three) Times a Day As Needed for Nausea or Vomiting. 30 tablet 5   • VITAMIN  E PO Take  by mouth.     • [DISCONTINUED] allopurinol (ZYLOPRIM) 300 MG tablet Take 0.5 tablets by mouth Daily. 15 tablet 0     No current facility-administered medications on file prior to visit.        ALLERGIES:     Allergies   Allergen Reactions   • Codeine Unknown - Low Severity     UNKNOWN           Cancer-related family history includes Cancer in his father.           Review of Systems:          General: no fever, no chills, no fatigue,no weight changes, no lack of appetite.  Eyes: no epiphora, xerophthalmia,conjunctivitis, pain, glaucoma, blurred vision, blindness, secretion, photophobia, proptosis, diplopia.  Ears: no otorrhea, tinnitus, otorrhagia, deafness, pain, vertigo.  Nose: no rhinorrhea, no epistaxis, no alteration in perception of odors, no sinuses pressure.  Mouth: no alteration in gums or teeth,  No ulcers, no difficulty with mastication or deglut ion, no odynophagia.  Neck: no masses or pain, no thyroid alterations, no pain in muscles or arteries, no carotid odynia, no crepitation.  Respiratory: no cough, no sputum production,no dyspnea,no trepopnea, no pleuritic pain,no hemoptysis.  Heart: no syncope, no irregularity, no palpitations, no angina,no orthopnea,no paroxysmal nocturnal dyspnea.  Vascular Venous: no tenderness,no edema,no palpable cords,no postphlebitic syndrome, no skin changes no ulcerations.  Vascular Arterial: no distal ischemia, noclaudication, no gangrene, no neuropathic ischemic pain, no skin ulcers, no paleness no cyanosis.  GI: no dysphagia, no odynophagia, no regurgitation, no heartburn,no indigestion,no nausea,no vomiting,no hematemesis ,no melena,no jaundice,no distention, no obstipation,no enterorrhagia,no proctalgia,no anal  lesions, no changes in bowel habits.  : no frequency, no hesitancy, no hematuria, no discharge,no  pain.  Musculoskeletal: stated muscle or tendon pain or inflammation,no  joint pain, no edema, no functional limitation,no fasciculations, no  "mass.  Neurologic: no headache, no seizures, noalterations on Craneal nerves, no motor deficit, no sensory deficit, normal coordination, no alteration in memory,normal orientation, calculation,normal writting, verbal and written language.  Skin: no rashes,no pruritus no localized lesions.  Psychiatric: no anxiety, no depression,no agitation, no delusions, proper insight.    Objective      Vitals:    08/07/20 0844   BP: (!) 194/83   Pulse: 52   Resp: 21   Temp: 97.3 °F (36.3 °C)   TempSrc: Temporal   SpO2: 99%   Weight: 105 kg (231 lb 6.4 oz)   Height: 182.9 cm (72.01\")   PainSc: 0-No pain     Current Status 8/7/2020   ECOG score 0     Physical Exam:  GENERAL:  Well-developed, well-nourished  Patient  in no acute distress.   SKIN:  Warm, dry ,NO rashes,NO purpura ,NO petechiae.wound r foot is clean no exudate or pain  HEENT:  Pupils were equal and reactive to light and accomodation, conjunctivas non injected, no pterigion, normal extraocular movements, normal visual acuity.   NECK:  Supple with good range of motion; no thyromegaly or masses, no JVD or bruits, no cervical adenopathies.No carotid arteries pain, no carotid abnormal pulsation , NO arterial dance.  LYMPHATICS:  No cervical, NO supraclavicular, NO axillary,NO epitrochlear , NO inguinal adenopathy.  CARDIAC   normal rate and regular rhythm, without murmur,NO rubs NO S3 NO S4 right or left . Normal femoral, popliteal, pedis, brachial and carotid pulses.  VASCULAR ARTERIAL: normal carotids,brachial,radial,femoral,popliteal, pedis pulses , no bruits.no paleness or cyanosis, no pain, no edema, no numbness, no gangrene.  VASCULAR VENOUS: no cyanosis, collateral circulation, varicosities, edema, palpable cords, pain, erythema.  ABDOMEN:  Soft, nontender with no hepatomegaly, no splenomegaly,no masses, no ascites, no collateral circulation,no distention,no Nirav sign, no abdominal pain, no inguinal hernias,no umbilical hernia, no abdominal bruits. Normal bowel " sounds.  GENITAL: Not  Performed.  EXTREMITIES  AND SPINE:  No clubbing, cyanosis or edema, no deformities or pain .No kyphosis, scoliosis, deformities or pain in spine, ribs or pelvic bone.  NEUROLOGICAL:  Patient was awake, alert, oriented to time, person and place.                            RECENT LABS:  Results from last 7 days   Lab Units 08/07/20  0818   WBC 10*3/mm3 8.79   NEUTROS ABS 10*3/mm3 3.78   HEMOGLOBIN g/dL 14.6   HEMATOCRIT % 42.5   PLATELETS 10*3/mm3 154               Assessment/Plan    1. Marginal zone lymphoma.  · Peripheral blood flow cytometry CD5/CD10/CD23 negative; positive for p53 and 13 Q deletion, negative for KAREN and trisomy 12.  · Bone marrow evaluation demonstrated involvement by low-grade B-cell lymphoma classified again as marginal zone lymphoma occupying 75% of the total marrow.  · Patient has no B symptoms, no bulky disease.    · Significant leukocytosis with WBC count for example today being 168,000.  · Patient already taking allopurinol 300 mg daily.  · Bendamustine/Rituxan initiated, 7/23/2019.  After receiving only 7 mL's of Rituxan the patient had a reaction, specifically he developed initially feelings of warmth and nausea.  He was given additional 20mg of IV Pepcid.  Patient then began reporting changes in vision as well as diaphoresis, and near syncopal.  He was given additional 200 mg of Solu-Cortef.  The patient was monitored.  We decided not to give further Rituxan that day, but he was brought back on day #2 and was able to complete his Rituxan without difficulty.  · 8/20/2019 due for cycle 2 months bendamustine /Rituxan, delayed due to slightly elevated creatinine 1.9 patient received IV hydration.  Improvement in absolute lymphocyte count to 39.11.   · Cycle 2 given 8/26/2019.    The patient completed his bendamustine/Rituxan therapy and now he is receiving Rituxan for 3 more sessions 2nd one today until he goes to [place] by the end of the year. The patient so far has  not had any B symptoms, no peripheral adenopathy, no hepatosplenomegaly, no pruritus and no other alteration related to his marginal cell non-Hodgkin's lymphoma. His peripheral blood flow cytometry on 09/24/2019 was very much negative for monoclonal population of cells.         The patient was further reviewed on 08/07/2020. His functionality remains excellent. He continues doing anything that he pleases with no limitations to the point that he is spreading much this week at home, he stepped up on the rake, had a wound in the right foot that required debridement and clean up. He has not developed any infection, he got a tetanus shot and he is doing fine.    From the point of view of his small cell lymphocytic lymphoma he has no symptoms pertinent to this. He has no fevers, no chills, no sweats, no pruritus, no adenopathies. His physical exam today is unremarkable. His white count, hemoglobin and platelets are normal, his white count differential is normal, there is no excess of lymphocytes.     As far as I can tell his lymphoma remains in remission and he remains on Rituxan maintenance every 3 months. He will proceed with an infusion today. He has not encountered any side effects of Rituxan.     The patient is not taking appropriate precautions to keep himself from coronavirus infection while playing golf with his friends. I asked him to do social distancing and most importantly to wear a mask. I pointed out to him why this is so important. His wife is undergoing cancer treatment as well and I pointed out to him that if he acquires the infection with the condition that he has and brings it to his wife it will be a disaster. He understands that clearly.     I will review him back in 3 months and 6 months, continue his Rituxan infusion maintenance for the time being.       I DISCUSSED WITH PATIENT IN DETAIL FORMS TO DECREASE CHANCES OF CORONAVIRUS INFECTION INCLUDING ISOLATION, PROPER HAND HIGIENE, AVOID PUBLIC PLACES   WITH CROWDS, FOLLOW  CDC RECOMENDATIONS, AND KEEP PERSONAL AND SOCIAL RESPONSIBILITY, WARE A MASK IN PUBLIC PLACES.  PATIENT IS AWARE THIS INFECTION COULD HAVE SEVERE CONSEQUENCES TO PERSONAL HEALTH AND FAMILY RAMIFICATIONS OF THIS.  Vic Herrera MD            Cc:  No ref. provider found

## 2020-08-10 NOTE — TELEPHONE ENCOUNTER
Pt needs appt - lov 5/2018   Subjective:   Natasha Pino is a 33 y.o. female who presents for Cough (congestion, heavy breathing/difficulty breathing, abdominal pain, x1 month )      This is a 33-year-old female who presents to urgent care complaining of runny nose, cough, and progressive allergy symptoms which is been going on since early July.  The patient presents with her entire family who all developed some mild upper respiratory allergy type symptoms at the same time.  Apparently the father the family became symptomatic first with a runny nose and a mild cough as well as 1 day of body aches.  Subsequent to this the father was tested for coronavirus and they found out yesterday that the father was positive.  The 2 children of the family have had only mild symptoms with runny nose, a very mild sore throat and nasal congestion.  The father symptoms have completely resolved.  The mother to family has had mild upper respiratory symptoms with an occasional cough that is worse with expectoration of phlegm in the morning.  Mother also developed symptoms of abdominal discomfort with alternating constipation and diarrhea and mild bloating which is intermittent, does not occur every day and has been ongoing for around 2 weeks.  Both children are well-appearing and eating and drinking normally.  The family has been self isolating since the father was tested for COVID-19 around 2 weeks ago.      Review of Systems   Constitutional: Positive for malaise/fatigue. Negative for chills and fever.   HENT: Positive for congestion and sinus pain. Negative for ear pain and sore throat.    Eyes: Negative for pain.   Respiratory: Positive for cough, sputum production and shortness of breath (mild).    Cardiovascular: Negative for chest pain and palpitations.   Gastrointestinal: Positive for abdominal pain, constipation and diarrhea. Negative for blood in stool, nausea and vomiting.   Genitourinary: Negative for dysuria.   Musculoskeletal: Negative for  "myalgias.   Skin: Negative for rash.   Neurological: Negative for dizziness and headaches.       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    Surgical History:  Past Surgical History:   Procedure Laterality Date   • LEEP  2011   • APPENDECTOMY         Past Social Hx: Natasha Pino  reports that she has never smoked. She has never used smokeless tobacco. She reports current alcohol use. She reports current drug use. Drug: Marijuana.     Past Family Hx:  Natasha Pino family history includes Cancer in her maternal grandmother and mother; Diabetes in her maternal grandmother; GI Disease in her sister; Heart Attack in her father; Hyperlipidemia in her father; Seizures in her sister.     Problem list, medications, and allergies reviewed by myself today in Epic.     Objective:     /78 (BP Location: Left arm, Patient Position: Sitting, BP Cuff Size: Adult)   Pulse 72   Temp 37.1 °C (98.8 °F) (Temporal)   Resp 20   Ht 1.626 m (5' 4\")   Wt 84.5 kg (186 lb 3.2 oz)   SpO2 97%   BMI 31.96 kg/m²     Physical Exam  Vitals signs reviewed.   Constitutional:       Appearance: Normal appearance.   HENT:      Head: Normocephalic and atraumatic.      Right Ear: External ear normal.      Left Ear: External ear normal.      Nose: Nose normal.      Mouth/Throat:      Mouth: Mucous membranes are moist.   Eyes:      Conjunctiva/sclera: Conjunctivae normal.   Neck:      Musculoskeletal: Normal range of motion. "   Cardiovascular:      Rate and Rhythm: Normal rate and regular rhythm.   Pulmonary:      Effort: Pulmonary effort is normal.      Breath sounds: Normal breath sounds.   Skin:     General: Skin is warm and dry.      Capillary Refill: Capillary refill takes less than 2 seconds.   Neurological:      Mental Status: She is alert and oriented to person, place, and time.         Assessment/Plan:     Diagnosis and associated orders:     1. Suspected COVID-19 virus infection  COVID/SARS COV-2 PCR   2. Cough  albuterol 108 (90 Base) MCG/ACT Aero Soln inhalation aerosol      Comments/MDM:     • I will contact mom with results of the whole family at her direction.  • The mother has frequent bronchitis and states she typically benefits from an inhaler when she has coughing episodes so I will send this to her pharmacy.  Her lung sounds are normal without wheezing.  Patient's vital signs are reassuring and they appear hemodynamically stable and do not require higher level care at this time  I discussed self isolation and provided printed instructions  I discussed ER precautions and provided printed instructions  I discussed returning to clinic for mild symptoms or reevaluation  I educated the patient on possibility of a false-negative test and indications for repeat testing  I instructed the patient to try to follow up with their PCP (if applicable) for follow up care  I provided the patient the printed AVS which contains information about signing up for [facility].  If the patient's results come back as NEGATIVE I will notify them via [facility], if the patient's test results are POSITIVE I WILL CALL THEM.    If requested, I provided the patient with a work note to provide to their employer or school regarding returning to work and discontinuation of self isolation.  All questions were answered and patient demonstrated verbal understanding of above.  I followed all reasonable PPE precautions during this encounter including but not  limited to use of an N95 mask, gloves, and gown if indicated.             Differential diagnosis, natural history, supportive care, and indications for immediate follow-up discussed.    Advised the patient to follow-up with the primary care physician for recheck, reevaluation, and consideration of further management.    Please note that this dictation was created using voice recognition software. I have made a reasonable attempt to correct obvious errors, but I expect that there are errors of grammar and possibly content that I did not discover before finalizing the note.    This note was electronically signed by Felice Kyle PA-C

## 2020-11-02 ENCOUNTER — LAB (OUTPATIENT)
Dept: LAB | Facility: HOSPITAL | Age: 80
End: 2020-11-02

## 2020-11-02 ENCOUNTER — OFFICE VISIT (OUTPATIENT)
Dept: ONCOLOGY | Facility: CLINIC | Age: 80
End: 2020-11-02

## 2020-11-02 ENCOUNTER — INFUSION (OUTPATIENT)
Dept: ONCOLOGY | Facility: HOSPITAL | Age: 80
End: 2020-11-02

## 2020-11-02 VITALS
WEIGHT: 233.3 LBS | BODY MASS INDEX: 31.6 KG/M2 | HEIGHT: 72 IN | TEMPERATURE: 97.3 F | OXYGEN SATURATION: 97 % | RESPIRATION RATE: 21 BRPM

## 2020-11-02 VITALS — DIASTOLIC BLOOD PRESSURE: 94 MMHG | HEART RATE: 53 BPM | SYSTOLIC BLOOD PRESSURE: 163 MMHG

## 2020-11-02 DIAGNOSIS — C83.08 SMALL B-CELL LYMPHOMA OF LYMPH NODES OF MULTIPLE REGIONS (HCC): Primary | ICD-10-CM

## 2020-11-02 DIAGNOSIS — C83.08 SMALL B-CELL LYMPHOMA OF LYMPH NODES OF MULTIPLE REGIONS (HCC): ICD-10-CM

## 2020-11-02 DIAGNOSIS — N18.30 STAGE 3 CHRONIC KIDNEY DISEASE, UNSPECIFIED WHETHER STAGE 3A OR 3B CKD (HCC): ICD-10-CM

## 2020-11-02 LAB
ALBUMIN SERPL-MCNC: 4.8 G/DL (ref 3.5–5.2)
ALBUMIN/GLOB SERPL: 2.1 G/DL (ref 1.1–2.4)
ALP SERPL-CCNC: 89 U/L (ref 38–116)
ALT SERPL W P-5'-P-CCNC: 22 U/L (ref 0–41)
ANION GAP SERPL CALCULATED.3IONS-SCNC: 12.1 MMOL/L (ref 5–15)
AST SERPL-CCNC: 21 U/L (ref 0–40)
BASOPHILS # BLD AUTO: 0.08 10*3/MM3 (ref 0–0.2)
BASOPHILS NFR BLD AUTO: 0.8 % (ref 0–1.5)
BILIRUB SERPL-MCNC: 0.3 MG/DL (ref 0.2–1.2)
BUN SERPL-MCNC: 22 MG/DL (ref 6–20)
BUN/CREAT SERPL: 13.5 (ref 7.3–30)
CALCIUM SPEC-SCNC: 10 MG/DL (ref 8.5–10.2)
CHLORIDE SERPL-SCNC: 104 MMOL/L (ref 98–107)
CO2 SERPL-SCNC: 22.9 MMOL/L (ref 22–29)
CREAT SERPL-MCNC: 1.63 MG/DL (ref 0.7–1.3)
DEPRECATED RDW RBC AUTO: 40.4 FL (ref 37–54)
EOSINOPHIL # BLD AUTO: 0.26 10*3/MM3 (ref 0–0.4)
EOSINOPHIL NFR BLD AUTO: 2.4 % (ref 0.3–6.2)
ERYTHROCYTE [DISTWIDTH] IN BLOOD BY AUTOMATED COUNT: 12.3 % (ref 12.3–15.4)
GFR SERPL CREATININE-BSD FRML MDRD: 41 ML/MIN/1.73
GLOBULIN UR ELPH-MCNC: 2.3 GM/DL (ref 1.8–3.5)
GLUCOSE SERPL-MCNC: 116 MG/DL (ref 74–124)
HCT VFR BLD AUTO: 44.1 % (ref 37.5–51)
HGB BLD-MCNC: 15.4 G/DL (ref 13–17.7)
IMM GRANULOCYTES # BLD AUTO: 0.07 10*3/MM3 (ref 0–0.05)
IMM GRANULOCYTES NFR BLD AUTO: 0.7 % (ref 0–0.5)
LYMPHOCYTES # BLD AUTO: 5.38 10*3/MM3 (ref 0.7–3.1)
LYMPHOCYTES NFR BLD AUTO: 50.5 % (ref 19.6–45.3)
MCH RBC QN AUTO: 31.4 PG (ref 26.6–33)
MCHC RBC AUTO-ENTMCNC: 34.9 G/DL (ref 31.5–35.7)
MCV RBC AUTO: 90 FL (ref 79–97)
MONOCYTES # BLD AUTO: 1.22 10*3/MM3 (ref 0.1–0.9)
MONOCYTES NFR BLD AUTO: 11.5 % (ref 5–12)
NEUTROPHILS NFR BLD AUTO: 3.64 10*3/MM3 (ref 1.7–7)
NEUTROPHILS NFR BLD AUTO: 34.1 % (ref 42.7–76)
NRBC BLD AUTO-RTO: 0 /100 WBC (ref 0–0.2)
PLATELET # BLD AUTO: 189 10*3/MM3 (ref 140–450)
PMV BLD AUTO: 10.9 FL (ref 6–12)
POTASSIUM SERPL-SCNC: 4.7 MMOL/L (ref 3.5–4.7)
PROT SERPL-MCNC: 7.1 G/DL (ref 6.3–8)
RBC # BLD AUTO: 4.9 10*6/MM3 (ref 4.14–5.8)
SODIUM SERPL-SCNC: 139 MMOL/L (ref 134–145)
WBC # BLD AUTO: 10.65 10*3/MM3 (ref 3.4–10.8)

## 2020-11-02 PROCEDURE — 99214 OFFICE O/P EST MOD 30 MIN: CPT | Performed by: INTERNAL MEDICINE

## 2020-11-02 PROCEDURE — 85025 COMPLETE CBC W/AUTO DIFF WBC: CPT

## 2020-11-02 PROCEDURE — 25010000002 DIPHENHYDRAMINE: Performed by: INTERNAL MEDICINE

## 2020-11-02 PROCEDURE — 96415 CHEMO IV INFUSION ADDL HR: CPT

## 2020-11-02 PROCEDURE — 25010000002 RITUXIMAB 10 MG/ML SOLUTION 10 ML VIAL: Performed by: INTERNAL MEDICINE

## 2020-11-02 PROCEDURE — 80053 COMPREHEN METABOLIC PANEL: CPT

## 2020-11-02 PROCEDURE — 96413 CHEMO IV INFUSION 1 HR: CPT

## 2020-11-02 PROCEDURE — 25010000002 HYDROCORTISONE SODIUM SUCCINATE 100 MG RECONSTITUTED SOLUTION: Performed by: INTERNAL MEDICINE

## 2020-11-02 PROCEDURE — 25010000002 RITUXIMAB 10 MG/ML SOLUTION 50 ML VIAL: Performed by: INTERNAL MEDICINE

## 2020-11-02 PROCEDURE — 36415 COLL VENOUS BLD VENIPUNCTURE: CPT

## 2020-11-02 PROCEDURE — 96375 TX/PRO/DX INJ NEW DRUG ADDON: CPT

## 2020-11-02 RX ORDER — MEPERIDINE HYDROCHLORIDE 50 MG/ML
25 INJECTION INTRAMUSCULAR; INTRAVENOUS; SUBCUTANEOUS
Status: CANCELLED | OUTPATIENT
Start: 2020-11-02

## 2020-11-02 RX ORDER — SODIUM CHLORIDE 9 MG/ML
250 INJECTION, SOLUTION INTRAVENOUS ONCE
Status: CANCELLED | OUTPATIENT
Start: 2020-11-02

## 2020-11-02 RX ORDER — SODIUM CHLORIDE 9 MG/ML
250 INJECTION, SOLUTION INTRAVENOUS ONCE
Status: COMPLETED | OUTPATIENT
Start: 2020-11-02 | End: 2020-11-02

## 2020-11-02 RX ORDER — ACETAMINOPHEN 325 MG/1
650 TABLET ORAL ONCE
Status: COMPLETED | OUTPATIENT
Start: 2020-11-02 | End: 2020-11-02

## 2020-11-02 RX ORDER — FAMOTIDINE 10 MG/ML
20 INJECTION, SOLUTION INTRAVENOUS AS NEEDED
Status: CANCELLED | OUTPATIENT
Start: 2020-11-02

## 2020-11-02 RX ORDER — ACETAMINOPHEN 325 MG/1
650 TABLET ORAL ONCE
Status: CANCELLED | OUTPATIENT
Start: 2020-11-02

## 2020-11-02 RX ORDER — FAMOTIDINE 20 MG/1
20 TABLET, FILM COATED ORAL ONCE
Status: COMPLETED | OUTPATIENT
Start: 2020-11-02 | End: 2020-11-02

## 2020-11-02 RX ORDER — DIPHENHYDRAMINE HYDROCHLORIDE 50 MG/ML
50 INJECTION INTRAMUSCULAR; INTRAVENOUS AS NEEDED
Status: CANCELLED | OUTPATIENT
Start: 2020-11-02

## 2020-11-02 RX ORDER — RIBOFLAVIN (VITAMIN B2) 100 MG
TABLET ORAL
COMMUNITY

## 2020-11-02 RX ORDER — FAMOTIDINE 20 MG/1
20 TABLET, FILM COATED ORAL ONCE
Status: CANCELLED | OUTPATIENT
Start: 2020-11-02

## 2020-11-02 RX ADMIN — HYDROCORTISONE SODIUM SUCCINATE 100 MG: 100 INJECTION, POWDER, FOR SOLUTION INTRAMUSCULAR; INTRAVENOUS at 11:17

## 2020-11-02 RX ADMIN — DIPHENHYDRAMINE HYDROCHLORIDE 25 MG: 50 INJECTION INTRAMUSCULAR; INTRAVENOUS at 11:25

## 2020-11-02 RX ADMIN — SODIUM CHLORIDE 250 ML: 9 INJECTION, SOLUTION INTRAVENOUS at 11:17

## 2020-11-02 RX ADMIN — FAMOTIDINE 20 MG: 20 TABLET ORAL at 11:17

## 2020-11-02 RX ADMIN — RITUXIMAB 830 MG: 10 INJECTION, SOLUTION INTRAVENOUS at 12:07

## 2020-11-02 RX ADMIN — ACETAMINOPHEN 650 MG: 325 TABLET ORAL at 11:17

## 2020-11-02 NOTE — PROGRESS NOTES
Subjective .    REASONS FOR FOLLOWUP: Marginal zone lymphoma STAGE IV    HISTORY OF PRESENT ILLNESS:    PATIENT WAS CALLED THE DAY BEFORE BY THE OFFICE TO ASK FOR SYMPTOMS THAT COULD BE CONSISTENT WITH CORONAVIRUS INFECTION, AND BEING NEGATIVE WAS SCHEDULED TO BE SEEN IN THE OFFICE TODAY. SIMILAR QUESTIONING TODAY INCLUDING, CHILLS, FEVER, NEW COUGH, SHORTNESS OF BREATH, DIARRHEA,DIFFUSE BODY ACHES  AND CHANGES IN SMELL OR TASTE WERE NEGATIVE.THE PATIENT DENIED ANY CONTACT WITH PERSONS WHO WERE POSITIVE FOR COVID, AND PATIENT IS NOT IN CATEGORY OF HIGH RISK BEHAVIOR TO ACQUIRE COVID.    DURING THE VISIT WITH THE PATIENT TODAY , PATIENT HAD FACE MASK, MY MEDICAL ASSISTANT AND I  HAD PROPPER PROTECTIVE EQUIPMENT, AND I DID HAND HYGIENE WITH SOAP AND WATER BEFORE AND AFTER THE VISIT.    This patient returns today to the office for follow up. He is here today stating that his wound in the foot after an accident that we described during the previous visit has healed completely. Other than that he has not had any fever, chills, night sweats, pruritus, adenopathy or infections of any nature. Energy level is great, continues playing golf at least twice a week and walking and doing a lot of work in the house. The patient is not having any other new issues. He is planning to go to [place] the 1st of December and he will return for the next Rituxan infusion in 3 months.             Past Medical History:   Diagnosis Date   • Abnormal ECG    • BPH (benign prostatic hyperplasia)    • Chronic kidney disease    • Disease of thyroid gland    • Hyperlipidemia    • Hyperparathyroidism (CMS/HCC) 2017    parathyroidectomy   • Hypertension    • Nicotine dependence    • TB (pulmonary tuberculosis)      Past Surgical History:   Procedure Laterality Date   • PARATHYROID GLAND SURGERY     • TRIGGER FINGER RELEASE      Left 5th finger     Family History   Problem Relation Age of Onset   • Heart disease Mother    • Stroke Mother    •  Diabetes Mother    • Hypertension Sister    • Diabetes Sister    • Cancer Father    • Leukemia Father      Social History     Socioeconomic History   • Marital status:      Spouse name: Not on file   • Number of children: 2   • Years of education: High school   • Highest education level: High school graduate   Occupational History     Employer: RETIRED   Social Needs   • Financial resource strain: Not hard at all   • Food insecurity     Worry: Never true     Inability: Never true   • Transportation needs     Medical: No     Non-medical: Not on file   Tobacco Use   • Smoking status: Current Every Day Smoker     Packs/day: 0.50     Years: 60.00     Pack years: 30.00     Types: Cigarettes   • Smokeless tobacco: Never Used   Substance and Sexual Activity   • Alcohol use: Yes     Alcohol/week: 2.0 standard drinks     Types: 2 Cans of beer per week     Frequency: 2-4 times a month     Drinks per session: 1 or 2     Binge frequency: Never     Comment: 2-3 beers 1-2 days a week   • Drug use: No     Comment: 2 cups coffee daily   • Sexual activity: Defer     Partners: Female       ONCOLOGIC HISTORY:  (History from previous dates can be found in the separate document.)    Current Outpatient Medications on File Prior to Visit   Medication Sig Dispense Refill   • acyclovir (ZOVIRAX) 400 MG tablet Take 1 tablet by mouth Daily. 60 tablet 5   • allopurinol (ZYLOPRIM) 300 MG tablet TAKE 1 TABLET BY MOUTH DAILY 90 tablet 1   • amLODIPine (NORVASC) 10 MG tablet TAKE 1 TABLET BY MOUTH EVERY DAY 90 tablet 0   • amLODIPine (NORVASC) 10 MG tablet TAKE 1 TABLET BY MOUTH EVERY DAY 30 tablet 0   • amoxicillin-clavulanate (AUGMENTIN) 875-125 MG per tablet Take 1 tablet by mouth 2 (Two) Times a Day. 20 tablet 0   • ascorbic acid (VITAMIN C) 100 MG tablet Take  by mouth.     • aspirin 81 MG tablet Take 81 mg by mouth.     • furosemide (LASIX) 20 MG tablet Take 20 mg by mouth Daily.     • HYDRALAZINE HCL PO Take 25 mg by mouth Daily.      • losartan (COZAAR) 100 MG tablet Take 100 mg by mouth Daily.     • Multiple Vitamins-Minerals (MULTIVITAL-M) tablet Take  by mouth Daily.     • Omega-3 Fatty Acids (FISH OIL) 1000 MG capsule capsule Take 1,000 mg by mouth Daily With Breakfast.     • ondansetron (ZOFRAN) 8 MG tablet Take 1 tablet by mouth 3 (Three) Times a Day As Needed for Nausea or Vomiting. 30 tablet 5   • VITAMIN E PO Take  by mouth.     • [DISCONTINUED] Ascorbic Acid (VITAMIN C PO) Take  by mouth.       No current facility-administered medications on file prior to visit.        ALLERGIES:     Allergies   Allergen Reactions   • Codeine Unknown - Low Severity     UNKNOWN           Cancer-related family history includes Cancer in his father.           Review of Systems:  General: no fever, no chills, no fatigue,no weight changes, no lack of appetite.  Eyes: no epiphora, xerophthalmia,conjunctivitis, pain, glaucoma, blurred vision, blindness, secretion, photophobia, proptosis, diplopia.  Ears: no otorrhea, tinnitus, otorrhagia, deafness, pain, vertigo.  Nose: no rhinorrhea, no epistaxis, no alteration in perception of odors, no sinuses pressure.  Mouth: no alteration in gums or teeth,  No ulcers, no difficulty with mastication or deglut ion, no odynophagia.  Neck: no masses or pain, no thyroid alterations, no pain in muscles or arteries, no carotid odynia, no crepitation.  Respiratory: no cough, no sputum production,no dyspnea,no trepopnea, no pleuritic pain,no hemoptysis.  Heart: no syncope, no irregularity, no palpitations, no angina,no orthopnea,no paroxysmal nocturnal dyspnea.  Vascular Venous: no tenderness,no edema,no palpable cords,no postphlebitic syndrome, no skin changes no ulcerations.  Vascular Arterial: no distal ischemia, noclaudication, no gangrene, no neuropathic ischemic pain, no skin ulcers, no paleness no cyanosis.  GI: no dysphagia, no odynophagia, no regurgitation, no heartburn,no indigestion,no nausea,no vomiting,no  "hematemesis ,no melena,no jaundice,no distention, no obstipation,no enterorrhagia,no proctalgia,no anal  lesions, no changes in bowel habits.  : no frequency, no hesitancy, no hematuria, no discharge,no  pain.  Musculoskeletal: no muscle or tendon pain or inflammation,no  joint pain, no edema, no functional limitation,no fasciculations, no mass.  Neurologic: no headache, no seizures, noalterations on Craneal nerves, no motor deficit, no sensory deficit, normal coordination, no alteration in memory,normal orientation, calculation,normal writting, verbal and written language.  Skin: no rashes,no pruritus no localized lesions.  Psychiatric: no anxiety, no depression,no agitation, no delusions, proper insight.              Objective      Vitals:    11/02/20 1032   Resp: 21   Temp: 97.3 °F (36.3 °C)   TempSrc: Temporal   SpO2: 97%   Weight: 106 kg (233 lb 4.8 oz)   Height: 182.9 cm (72.01\")   PainSc: 0-No pain     Current Status 11/2/2020   ECOG score 0     Physical Exam:I HAVE PERSONALLY REVIEWED THE HISTORY OF THE PRESENT ILLNESS, PAST MEDICAL HISTORY, FAMILY HISTORY, SOCIAL HISTORY, ALLERGIES, MEDICATIONS STATED ABOVE IN THE OFFICE NOTE FROM TODAY.        GENERAL:  Well-developed, well-nourished  Patient  in no acute distress.   SKIN:  Warm, dry ,NO rashes,NO purpura ,NO petechiae.  HEENT:  Pupils were equal and reactive to light and accomodation, conjunctivae noninjected, no pterigion, normal extraocular movements, normal visual acuity.   NECK:  Supple with good range of motion; no thyromegaly or masses, no JVD or bruits, no cervical adenopathies.No carotid artery pain, no carotid abnormal pulsation , NO arterial dance.  LYMPHATICS:  No cervical, NO supraclavicular, NO axillary,NO epitrochlear , NO inguinal adenopathy.  CARDIAC   normal rate and regular rhythm, without murmur,NO rubs NO S3 NO S4 right or left . Normal femoral, popliteal, pedis, brachial and carotid pulses.  VASCULAR ARTERIAL: normal " carotids,brachial,radial,femoral,popliteal, pedis pulses , no bruits.no paleness or cyanosis, no pain, no edema, no numbness, no gangrene.  VASCULAR VENOUS: no cyanosis, collateral circulation, varicosities, edema, palpable cords, pain, erythema.  ABDOMEN:  Soft, nontender with no hepatomegaly, no splenomegaly,no masses, no ascites, no collateral circulation,no distention,no [place] sign, no abdominal pain, no inguinal hernias,no umbilical hernia, no abdominal bruits. Normal bowel sounds.  GENITAL: Not  Performed.  EXTREMITIES  AND SPINE:  No clubbing, cyanosis or edema, no deformities or pain .No kyphosis, scoliosis, deformities or pain in spine, ribs or pelvic bone.  NEUROLOGICAL:  Patient was awake, alert, oriented to time, person and place.                              RECENT LABS:  Results from last 7 days   Lab Units 11/02/20  1017   WBC 10*3/mm3 10.65   NEUTROS ABS 10*3/mm3 3.64   HEMOGLOBIN g/dL 15.4   HEMATOCRIT % 44.1   PLATELETS 10*3/mm3 189               Assessment/Plan    1. Marginal zone lymphoma.  · Peripheral blood flow cytometry CD5/CD10/CD23 negative; positive for p53 and 13 Q deletion, negative for KAREN and trisomy 12.  · Bone marrow evaluation demonstrated involvement by low-grade B-cell lymphoma classified again as marginal zone lymphoma occupying 75% of the total marrow.  · Patient has no B symptoms, no bulky disease.    · Significant leukocytosis with WBC count for example today being 168,000.  · Patient already taking allopurinol 300 mg daily.  · Bendamustine/Rituxan initiated, 7/23/2019.  After receiving only 7 mL's of Rituxan the patient had a reaction, specifically he developed initially feelings of warmth and nausea.  He was given additional 20mg of IV Pepcid.  Patient then began reporting changes in vision as well as diaphoresis, and near syncopal.  He was given additional 200 mg of Solu-Cortef.  The patient was monitored.  We decided not to give further Rituxan that day, but he was  brought back on day #2 and was able to complete his Rituxan without difficulty.  · 8/20/2019 due for cycle 2 months bendamustine /Rituxan, delayed due to slightly elevated creatinine 1.9 patient received IV hydration.  Improvement in absolute lymphocyte count to 39.11.   · Cycle 2 given 8/26/2019.    The patient completed his bendamustine/Rituxan therapy and now he is receiving Rituxan maintenance for 2 years to be completed in September 2021  The patient was further reviewed on 11/02/2020. Overall from the point of view of his lymphoma he has no symptoms pertinent to this at all. His appetite and weight remain stable, his bowel function and urination remain normal. He has no B symptoms, he has no peripheral adenopathy or hepatosplenomegaly. His white count, hemoglobin and platelets remain normal. In his peripheral blood he still has a minimal element of lymphocytosis. We have done on many occasions peripheral blood flow cytometry failing to document any monoclonal population of lymphocytes after the patient had treatment with bendamustine and Rituxan.     I think the patient continues doing well.     RECOMMENDATIONS:  1. He will proceed with his Rituxan today and he will return to have Rituxan again in 3 months with a CBC and a CMP. He will need to fly back from [place] at that point and he does not mind.     The patient will be completing his Rituxan maintenance in 09/2021.     I find no need for radiological assessment on him at this time.    He has been seen by his nephrologist recently and his creatinine remains stable, it has been our observation in that regard.       I DISCUSSED WITH PATIENT IN DETAIL FORMS TO DECREASE CHANCES OF CORONAVIRUS INFECTION INCLUDING ISOLATION, PROPER HAND HIGIENE, AVOID PUBLIC PLACES  WITH CROWDS, FOLLOW  CDC RECOMENDATIONS, AND KEEP PERSONAL AND SOCIAL RESPONSIBILITY, WARE A MASK IN PUBLIC PLACES.  PATIENT IS AWARE THIS INFECTION COULD HAVE SEVERE CONSEQUENCES TO PERSONAL HEALTH  AND FAMILY RAMIFICATIONS OF THIS.  Saima Higuera MA            Cc:  No ref. provider found

## 2020-12-02 ENCOUNTER — TELEPHONE (OUTPATIENT)
Dept: ONCOLOGY | Facility: CLINIC | Age: 80
End: 2020-12-02

## 2020-12-02 NOTE — TELEPHONE ENCOUNTER
Returning pts call. Pt stated he is out his lasix and needs it refilled. Told pt that it doesn't look like it was our office that filled his prescription. Pt stated he thinks it was Dr. Herrera. Told pt I would speak to Dr. Herrera and call pt back. Pt v/u.    Spoke with Dr. Herrera. Dr. Herrera does not think he was the prescriber for lasix. Recommended pt speak with nephrologist.     Called pt back to update him. Told pt to call his nephrologist to see if they would fill lasix since they are monitoring his kidney function. Pt v/u and said he would call.

## 2020-12-02 NOTE — TELEPHONE ENCOUNTER
PT:SELF    PT CALLING CAUSE HE NEEDS A NEW RX FOR HIS LASIX 20.  SAID THAT HE CALLED PHARMACY AND THEY WOULDN'T REFILL HIS OLD RX. PLEASE ADVISE    PT #: [phone]

## 2020-12-11 ENCOUNTER — TELEPHONE (OUTPATIENT)
Dept: ONCOLOGY | Facility: CLINIC | Age: 80
End: 2020-12-11

## 2020-12-11 NOTE — TELEPHONE ENCOUNTER
Pt wants to move 2.2.20 appt to 2.5.20, he's in [place] and needs to arrange flights to get treatment in [facility] and fly back to [place] on Sunday.    [phone] PH

## 2021-02-05 ENCOUNTER — INFUSION (OUTPATIENT)
Dept: ONCOLOGY | Facility: HOSPITAL | Age: 81
End: 2021-02-05

## 2021-02-05 ENCOUNTER — LAB (OUTPATIENT)
Dept: LAB | Facility: HOSPITAL | Age: 81
End: 2021-02-05

## 2021-02-05 ENCOUNTER — OFFICE VISIT (OUTPATIENT)
Dept: ONCOLOGY | Facility: CLINIC | Age: 81
End: 2021-02-05

## 2021-02-05 VITALS
HEART RATE: 63 BPM | HEIGHT: 72 IN | WEIGHT: 239.2 LBS | BODY MASS INDEX: 32.4 KG/M2 | DIASTOLIC BLOOD PRESSURE: 67 MMHG | TEMPERATURE: 98.4 F | RESPIRATION RATE: 18 BRPM | OXYGEN SATURATION: 96 % | SYSTOLIC BLOOD PRESSURE: 150 MMHG

## 2021-02-05 VITALS — HEART RATE: 51 BPM | SYSTOLIC BLOOD PRESSURE: 124 MMHG | DIASTOLIC BLOOD PRESSURE: 69 MMHG

## 2021-02-05 DIAGNOSIS — N18.30 STAGE 3 CHRONIC KIDNEY DISEASE, UNSPECIFIED WHETHER STAGE 3A OR 3B CKD (HCC): ICD-10-CM

## 2021-02-05 DIAGNOSIS — C83.08 SMALL B-CELL LYMPHOMA OF LYMPH NODES OF MULTIPLE REGIONS (HCC): Primary | ICD-10-CM

## 2021-02-05 DIAGNOSIS — C83.08 SMALL B-CELL LYMPHOMA OF LYMPH NODES OF MULTIPLE REGIONS (HCC): ICD-10-CM

## 2021-02-05 LAB
BASOPHILS # BLD AUTO: 0.09 10*3/MM3 (ref 0–0.2)
BASOPHILS NFR BLD AUTO: 0.8 % (ref 0–1.5)
DEPRECATED RDW RBC AUTO: 40.7 FL (ref 37–54)
EOSINOPHIL # BLD AUTO: 0.17 10*3/MM3 (ref 0–0.4)
EOSINOPHIL NFR BLD AUTO: 1.4 % (ref 0.3–6.2)
ERYTHROCYTE [DISTWIDTH] IN BLOOD BY AUTOMATED COUNT: 12.3 % (ref 12.3–15.4)
HCT VFR BLD AUTO: 38 % (ref 37.5–51)
HGB BLD-MCNC: 13.1 G/DL (ref 13–17.7)
IMM GRANULOCYTES # BLD AUTO: 0.11 10*3/MM3 (ref 0–0.05)
IMM GRANULOCYTES NFR BLD AUTO: 0.9 % (ref 0–0.5)
LYMPHOCYTES # BLD AUTO: 5.7 10*3/MM3 (ref 0.7–3.1)
LYMPHOCYTES NFR BLD AUTO: 47.6 % (ref 19.6–45.3)
MCH RBC QN AUTO: 31.3 PG (ref 26.6–33)
MCHC RBC AUTO-ENTMCNC: 34.5 G/DL (ref 31.5–35.7)
MCV RBC AUTO: 90.9 FL (ref 79–97)
MONOCYTES # BLD AUTO: 1.17 10*3/MM3 (ref 0.1–0.9)
MONOCYTES NFR BLD AUTO: 9.8 % (ref 5–12)
NEUTROPHILS NFR BLD AUTO: 39.5 % (ref 42.7–76)
NEUTROPHILS NFR BLD AUTO: 4.74 10*3/MM3 (ref 1.7–7)
NRBC BLD AUTO-RTO: 0 /100 WBC (ref 0–0.2)
PLATELET # BLD AUTO: 156 10*3/MM3 (ref 140–450)
PMV BLD AUTO: 11.5 FL (ref 6–12)
RBC # BLD AUTO: 4.18 10*6/MM3 (ref 4.14–5.8)
WBC # BLD AUTO: 11.98 10*3/MM3 (ref 3.4–10.8)

## 2021-02-05 PROCEDURE — 25010000002 DIPHENHYDRAMINE PER 50 MG: Performed by: INTERNAL MEDICINE

## 2021-02-05 PROCEDURE — 85025 COMPLETE CBC W/AUTO DIFF WBC: CPT

## 2021-02-05 PROCEDURE — 99214 OFFICE O/P EST MOD 30 MIN: CPT | Performed by: INTERNAL MEDICINE

## 2021-02-05 PROCEDURE — 36415 COLL VENOUS BLD VENIPUNCTURE: CPT

## 2021-02-05 PROCEDURE — 25010000002 RITUXIMAB 10 MG/ML SOLUTION 50 ML VIAL: Performed by: INTERNAL MEDICINE

## 2021-02-05 PROCEDURE — 25010000002 RITUXIMAB 10 MG/ML SOLUTION 10 ML VIAL: Performed by: INTERNAL MEDICINE

## 2021-02-05 PROCEDURE — 96413 CHEMO IV INFUSION 1 HR: CPT

## 2021-02-05 PROCEDURE — 96375 TX/PRO/DX INJ NEW DRUG ADDON: CPT

## 2021-02-05 PROCEDURE — 96415 CHEMO IV INFUSION ADDL HR: CPT

## 2021-02-05 RX ORDER — FAMOTIDINE 10 MG/ML
20 INJECTION, SOLUTION INTRAVENOUS AS NEEDED
Status: CANCELLED | OUTPATIENT
Start: 2021-02-05

## 2021-02-05 RX ORDER — DIPHENHYDRAMINE HYDROCHLORIDE 50 MG/ML
50 INJECTION INTRAMUSCULAR; INTRAVENOUS AS NEEDED
Status: CANCELLED | OUTPATIENT
Start: 2021-02-05

## 2021-02-05 RX ORDER — SODIUM CHLORIDE 9 MG/ML
250 INJECTION, SOLUTION INTRAVENOUS ONCE
Status: CANCELLED | OUTPATIENT
Start: 2021-02-05

## 2021-02-05 RX ORDER — ACETAMINOPHEN 325 MG/1
650 TABLET ORAL ONCE
Status: CANCELLED | OUTPATIENT
Start: 2021-02-05

## 2021-02-05 RX ORDER — ACETAMINOPHEN 325 MG/1
650 TABLET ORAL ONCE
Status: COMPLETED | OUTPATIENT
Start: 2021-02-05 | End: 2021-02-05

## 2021-02-05 RX ORDER — FAMOTIDINE 20 MG/1
20 TABLET, FILM COATED ORAL ONCE
Status: COMPLETED | OUTPATIENT
Start: 2021-02-05 | End: 2021-02-05

## 2021-02-05 RX ORDER — SODIUM CHLORIDE 9 MG/ML
250 INJECTION, SOLUTION INTRAVENOUS ONCE
Status: COMPLETED | OUTPATIENT
Start: 2021-02-05 | End: 2021-02-05

## 2021-02-05 RX ORDER — FAMOTIDINE 20 MG/1
20 TABLET, FILM COATED ORAL ONCE
Status: CANCELLED | OUTPATIENT
Start: 2021-02-05

## 2021-02-05 RX ORDER — MEPERIDINE HYDROCHLORIDE 50 MG/ML
25 INJECTION INTRAMUSCULAR; INTRAVENOUS; SUBCUTANEOUS
Status: CANCELLED | OUTPATIENT
Start: 2021-02-05

## 2021-02-05 RX ADMIN — SODIUM CHLORIDE 250 ML: 9 INJECTION, SOLUTION INTRAVENOUS at 10:46

## 2021-02-05 RX ADMIN — FAMOTIDINE 20 MG: 20 TABLET, FILM COATED ORAL at 10:46

## 2021-02-05 RX ADMIN — ACETAMINOPHEN 650 MG: 325 TABLET ORAL at 10:46

## 2021-02-05 RX ADMIN — DIPHENHYDRAMINE HYDROCHLORIDE 25 MG: 50 INJECTION, SOLUTION INTRAMUSCULAR; INTRAVENOUS at 10:46

## 2021-02-05 RX ADMIN — RITUXIMAB 830 MG: 10 INJECTION, SOLUTION INTRAVENOUS at 11:30

## 2021-02-05 NOTE — PROGRESS NOTES
Subjective .    REASONS FOR FOLLOWUP: Marginal zone lymphoma STAGE IV    HISTORY OF PRESENT ILLNESS:    PATIENT WAS CALLED THE DAY BEFORE BY THE OFFICE TO ASK FOR SYMPTOMS THAT COULD BE CONSISTENT WITH CORONAVIRUS INFECTION, AND BEING NEGATIVE WAS SCHEDULED TO BE SEEN IN THE OFFICE TODAY. SIMILAR QUESTIONING TODAY INCLUDING, CHILLS, FEVER, NEW COUGH, SHORTNESS OF BREATH, DIARRHEA,DIFFUSE BODY ACHES  AND CHANGES IN SMELL OR TASTE WERE NEGATIVE.THE PATIENT DENIED ANY CONTACT WITH PERSONS WHO WERE POSITIVE FOR COVID, AND PATIENT IS NOT IN CATEGORY OF HIGH RISK BEHAVIOR TO ACQUIRE COVID.    DURING THE VISIT WITH THE PATIENT TODAY , PATIENT HAD FACE MASK, MY MEDICAL ASSISTANT AND I  HAD PROPPER PROTECTIVE EQUIPMENT, AND I DID HAND HYGIENE WITH SOAP AND WATER BEFORE AND AFTER THE VISIT.            Past Medical History:   Diagnosis Date   • Abnormal ECG    • BPH (benign prostatic hyperplasia)    • Chronic kidney disease    • Disease of thyroid gland    • Hyperlipidemia    • Hyperparathyroidism (CMS/HCC) 2017    parathyroidectomy   • Hypertension    • Nicotine dependence    • TB (pulmonary tuberculosis)      Past Surgical History:   Procedure Laterality Date   • PARATHYROID GLAND SURGERY     • TRIGGER FINGER RELEASE      Left 5th finger     Family History   Problem Relation Age of Onset   • Heart disease Mother    • Stroke Mother    • Diabetes Mother    • Hypertension Sister    • Diabetes Sister    • Cancer Father    • Leukemia Father      Social History     Socioeconomic History   • Marital status:      Spouse name: Not on file   • Number of children: 2   • Years of education: High school   • Highest education level: High school graduate   Occupational History     Employer: RETIRED   Social Needs   • Financial resource strain: Not hard at all   • Food insecurity     Worry: Never true     Inability: Never true   • Transportation needs     Medical: No     Non-medical: Not on file   Tobacco Use   • Smoking status:  Current Every Day Smoker     Packs/day: 0.50     Years: 60.00     Pack years: 30.00     Types: Cigarettes   • Smokeless tobacco: Never Used   Substance and Sexual Activity   • Alcohol use: Yes     Alcohol/week: 2.0 standard drinks     Types: 2 Cans of beer per week     Frequency: 2-4 times a month     Drinks per session: 1 or 2     Binge frequency: Never     Comment: 2-3 beers 1-2 days a week   • Drug use: No     Comment: 2 cups coffee daily   • Sexual activity: Defer     Partners: Female       ONCOLOGIC HISTORY:  (History from previous dates can be found in the separate document.)    Current Outpatient Medications on File Prior to Visit   Medication Sig Dispense Refill   • acyclovir (ZOVIRAX) 400 MG tablet Take 1 tablet by mouth Daily. 60 tablet 5   • amLODIPine (NORVASC) 10 MG tablet TAKE 1 TABLET BY MOUTH EVERY DAY 90 tablet 0   • ascorbic acid (VITAMIN C) 100 MG tablet Take  by mouth.     • aspirin 81 MG tablet Take 81 mg by mouth.     • furosemide (LASIX) 20 MG tablet Take 20 mg by mouth Daily.     • HYDRALAZINE HCL PO Take 25 mg by mouth Daily.     • losartan (COZAAR) 100 MG tablet Take 100 mg by mouth Daily.     • Multiple Vitamins-Minerals (MULTIVITAL-M) tablet Take  by mouth Daily.     • Omega-3 Fatty Acids (FISH OIL) 1000 MG capsule capsule Take 1,000 mg by mouth Daily With Breakfast.     • ondansetron (ZOFRAN) 8 MG tablet Take 1 tablet by mouth 3 (Three) Times a Day As Needed for Nausea or Vomiting. 30 tablet 5   • VITAMIN E PO Take  by mouth.     • [DISCONTINUED] allopurinol (ZYLOPRIM) 300 MG tablet TAKE 1 TABLET BY MOUTH DAILY 90 tablet 1   • [DISCONTINUED] amLODIPine (NORVASC) 10 MG tablet TAKE 1 TABLET BY MOUTH EVERY DAY 30 tablet 0   • [DISCONTINUED] amoxicillin-clavulanate (AUGMENTIN) 875-125 MG per tablet Take 1 tablet by mouth 2 (Two) Times a Day. 20 tablet 0     No current facility-administered medications on file prior to visit.        ALLERGIES:     Allergies   Allergen Reactions   • Codeine  "Unknown - Low Severity     UNKNOWN           Cancer-related family history includes Cancer in his father.             Objective      Vitals:    02/05/21 0943   BP: 150/67   Pulse: 63   Resp: 18   Temp: 98.4 °F (36.9 °C)   TempSrc: Temporal   SpO2: 96%   Weight: 109 kg (239 lb 3.2 oz)   Height: 182.9 cm (72.01\")   PainSc: 0-No pain     Current Status 2/5/2021   ECOG score 0     Physical Exam:  I HAVE PERSONALLY REVIEWED THE HISTORY OF THE PRESENT ILLNESS, PAST MEDICAL HISTORY, FAMILY HISTORY, SOCIAL HISTORY, ALLERGIES, MEDICATIONS STATED ABOVE IN THE OFFICE NOTE FROM TODAY.        GENERAL:  Well-developed, well-nourished  Patient  in no acute distress.   SKIN:  Warm, dry ,NO rashes,NO purpura ,NO petechiae.  HEENT:  Pupils were equal and reactive to light and accomodation, conjunctivae noninjected, no pterygium, normal extraocular movements, normal visual acuity.   NECK:  Supple with good range of motion; no thyromegaly or masses, no JVD or bruits, no cervical adenopathies.No carotid artery pain, no carotid abnormal pulsation , NO arterial dance.  LYMPHATICS:  No cervical, NO supraclavicular, NO axillary,NO epitrochlear , NO inguinal adenopathy.  CARDIAC   normal rate and regular rhythm, without murmur,NO rubs NO S3 NO S4 right or left . Normal femoral, popliteal, pedis, brachial and carotid pulses.  VASCULAR ARTERIAL: normal carotids,brachial,radial,femoral,popliteal, pedis pulses , no bruits.no paleness or cyanosis, no pain, no edema, no numbness, no gangrene.  VASCULAR VENOUS: no cyanosis, collateral circulation, varicosities, edema, palpable cords, pain, erythema.  ABDOMEN:  Soft, nontender with no hepatomegaly, no splenomegaly,no masses, no ascites, no collateral circulation,no distention,no [place] sign, no abdominal pain, no inguinal hernias,no umbilical hernia, no abdominal bruits. Normal bowel sounds.  GENITAL: Not  Performed.  EXTREMITIES  AND SPINE:  No clubbing, cyanosis or edema, no deformities or pain " .No kyphosis, scoliosis, deformities or pain in spine, ribs or pelvic bone.  NEUROLOGICAL:  Patient was awake, alert, oriented to time, person and place.                              RECENT LABS:  Results from last 7 days   Lab Units 02/05/21  0924   WBC 10*3/mm3 11.98*   NEUTROS ABS 10*3/mm3 4.74   HEMOGLOBIN g/dL 13.1   HEMATOCRIT % 38.0   PLATELETS 10*3/mm3 156               Assessment/Plan    1. Marginal zone lymphoma.  · Peripheral blood flow cytometry CD5/CD10/CD23 negative; positive for p53 and 13 Q deletion, negative for KAREN and trisomy 12.  · Bone marrow evaluation demonstrated involvement by low-grade B-cell lymphoma classified again as marginal zone lymphoma occupying 75% of the total marrow.  · Patient has no B symptoms, no bulky disease.    · Significant leukocytosis with WBC count for example today being 168,000.  · Patient already taking allopurinol 300 mg daily.  · Bendamustine/Rituxan initiated, 7/23/2019.  After receiving only 7 mL's of Rituxan the patient had a reaction, specifically he developed initially feelings of warmth and nausea.  He was given additional 20mg of IV Pepcid.  Patient then began reporting changes in vision as well as diaphoresis, and near syncopal.  He was given additional 200 mg of Solu-Cortef.  The patient was monitored.  We decided not to give further Rituxan that day, but he was brought back on day #2 and was able to complete his Rituxan without difficulty.  · 8/20/2019 due for cycle 2 months bendamustine /Rituxan, delayed due to slightly elevated creatinine 1.9 patient received IV hydration.  Improvement in absolute lymphocyte count to 39.11.   · Cycle 2 given 8/26/2019.    The patient completed his bendamustine/Rituxan therapy and now he is receiving Rituxan maintenance for 2 years to be completed in September 2021  The patient was further reviewed on 11/02/2020. Overall from the point of view of his lymphoma he has no symptoms pertinent to this at all. His appetite and  weight remain stable, his bowel function and urination remain normal. He has no B symptoms, he has no peripheral adenopathy or hepatosplenomegaly. His white count, hemoglobin and platelets remain normal. In his peripheral blood he still has a minimal element of lymphocytosis. We have done on many occasions peripheral blood flow cytometry failing to document any monoclonal population of lymphocytes after the patient had treatment with bendamustine and Rituxan.     I think the patient continues doing well.     RECOMMENDATIONS:  1. He will proceed with his Rituxan today and he will return to have Rituxan again in 3 months with a CBC and a CMP. He will need to fly back from [place] at that point and he does not mind.     The patient will be completing his Rituxan maintenance in 09/2021.     I find no need for radiological assessment on him at this time.    He has been seen by his nephrologist recently and his creatinine remains stable, it has been our observation in that regard.       I DISCUSSED WITH PATIENT IN DETAIL FORMS TO DECREASE CHANCES OF CORONAVIRUS INFECTION INCLUDING ISOLATION, PROPER HAND HIGIENE, AVOID PUBLIC PLACES  WITH CROWDS, FOLLOW  CDC RECOMENDATIONS, AND KEEP PERSONAL AND SOCIAL RESPONSIBILITY, WARE A MASK IN PUBLIC PLACES.  PATIENT IS AWARE THIS INFECTION COULD HAVE SEVERE CONSEQUENCES TO PERSONAL HEALTH AND FAMILY RAMIFICATIONS OF THIS.  Vic Herrera MD            Cc:  No ref. provider found

## 2021-04-30 ENCOUNTER — LAB (OUTPATIENT)
Dept: LAB | Facility: HOSPITAL | Age: 81
End: 2021-04-30

## 2021-04-30 ENCOUNTER — INFUSION (OUTPATIENT)
Dept: ONCOLOGY | Facility: HOSPITAL | Age: 81
End: 2021-04-30

## 2021-04-30 ENCOUNTER — OFFICE VISIT (OUTPATIENT)
Dept: ONCOLOGY | Facility: CLINIC | Age: 81
End: 2021-04-30

## 2021-04-30 VITALS
RESPIRATION RATE: 19 BRPM | BODY MASS INDEX: 31.37 KG/M2 | WEIGHT: 231.6 LBS | DIASTOLIC BLOOD PRESSURE: 66 MMHG | HEIGHT: 72 IN | SYSTOLIC BLOOD PRESSURE: 139 MMHG | OXYGEN SATURATION: 97 % | TEMPERATURE: 97.3 F | HEART RATE: 55 BPM

## 2021-04-30 VITALS — SYSTOLIC BLOOD PRESSURE: 152 MMHG | DIASTOLIC BLOOD PRESSURE: 72 MMHG | HEART RATE: 46 BPM

## 2021-04-30 DIAGNOSIS — C83.08 SMALL B-CELL LYMPHOMA OF LYMPH NODES OF MULTIPLE REGIONS (HCC): ICD-10-CM

## 2021-04-30 DIAGNOSIS — N18.30 STAGE 3 CHRONIC KIDNEY DISEASE, UNSPECIFIED WHETHER STAGE 3A OR 3B CKD (HCC): ICD-10-CM

## 2021-04-30 DIAGNOSIS — C83.08 SMALL B-CELL LYMPHOMA OF LYMPH NODES OF MULTIPLE REGIONS (HCC): Primary | ICD-10-CM

## 2021-04-30 LAB
ALBUMIN SERPL-MCNC: 4.5 G/DL (ref 3.5–5.2)
ALBUMIN/GLOB SERPL: 1.9 G/DL (ref 1.1–2.4)
ALP SERPL-CCNC: 93 U/L (ref 38–116)
ALT SERPL W P-5'-P-CCNC: 18 U/L (ref 0–41)
ANION GAP SERPL CALCULATED.3IONS-SCNC: 11.2 MMOL/L (ref 5–15)
AST SERPL-CCNC: 23 U/L (ref 0–40)
BASOPHILS # BLD AUTO: 0.1 10*3/MM3 (ref 0–0.2)
BASOPHILS NFR BLD AUTO: 1 % (ref 0–1.5)
BILIRUB SERPL-MCNC: 0.3 MG/DL (ref 0.2–1.2)
BUN SERPL-MCNC: 32 MG/DL (ref 6–20)
BUN/CREAT SERPL: 18.1 (ref 7.3–30)
CALCIUM SPEC-SCNC: 9.8 MG/DL (ref 8.5–10.2)
CHLORIDE SERPL-SCNC: 108 MMOL/L (ref 98–107)
CO2 SERPL-SCNC: 19.8 MMOL/L (ref 22–29)
CREAT SERPL-MCNC: 1.77 MG/DL (ref 0.7–1.3)
DEPRECATED RDW RBC AUTO: 42.6 FL (ref 37–54)
EOSINOPHIL # BLD AUTO: 0.22 10*3/MM3 (ref 0–0.4)
EOSINOPHIL NFR BLD AUTO: 2.3 % (ref 0.3–6.2)
ERYTHROCYTE [DISTWIDTH] IN BLOOD BY AUTOMATED COUNT: 13 % (ref 12.3–15.4)
GFR SERPL CREATININE-BSD FRML MDRD: 37 ML/MIN/1.73
GLOBULIN UR ELPH-MCNC: 2.4 GM/DL (ref 1.8–3.5)
GLUCOSE SERPL-MCNC: 110 MG/DL (ref 74–124)
HCT VFR BLD AUTO: 41.9 % (ref 37.5–51)
HGB BLD-MCNC: 14.2 G/DL (ref 13–17.7)
IMM GRANULOCYTES # BLD AUTO: 0.11 10*3/MM3 (ref 0–0.05)
IMM GRANULOCYTES NFR BLD AUTO: 1.1 % (ref 0–0.5)
LYMPHOCYTES # BLD AUTO: 4.21 10*3/MM3 (ref 0.7–3.1)
LYMPHOCYTES NFR BLD AUTO: 43.8 % (ref 19.6–45.3)
MCH RBC QN AUTO: 30.7 PG (ref 26.6–33)
MCHC RBC AUTO-ENTMCNC: 33.9 G/DL (ref 31.5–35.7)
MCV RBC AUTO: 90.5 FL (ref 79–97)
MONOCYTES # BLD AUTO: 1.17 10*3/MM3 (ref 0.1–0.9)
MONOCYTES NFR BLD AUTO: 12.2 % (ref 5–12)
NEUTROPHILS NFR BLD AUTO: 3.8 10*3/MM3 (ref 1.7–7)
NEUTROPHILS NFR BLD AUTO: 39.6 % (ref 42.7–76)
NRBC BLD AUTO-RTO: 0 /100 WBC (ref 0–0.2)
PLATELET # BLD AUTO: 174 10*3/MM3 (ref 140–450)
PMV BLD AUTO: 11.1 FL (ref 6–12)
POTASSIUM SERPL-SCNC: 5.1 MMOL/L (ref 3.5–4.7)
PROT SERPL-MCNC: 6.9 G/DL (ref 6.3–8)
RBC # BLD AUTO: 4.63 10*6/MM3 (ref 4.14–5.8)
SODIUM SERPL-SCNC: 139 MMOL/L (ref 134–145)
WBC # BLD AUTO: 9.61 10*3/MM3 (ref 3.4–10.8)

## 2021-04-30 PROCEDURE — 99214 OFFICE O/P EST MOD 30 MIN: CPT | Performed by: INTERNAL MEDICINE

## 2021-04-30 PROCEDURE — 36415 COLL VENOUS BLD VENIPUNCTURE: CPT

## 2021-04-30 PROCEDURE — 25010000002 HYDROCORTISONE SODIUM SUCCINATE 100 MG RECONSTITUTED SOLUTION: Performed by: INTERNAL MEDICINE

## 2021-04-30 PROCEDURE — 85025 COMPLETE CBC W/AUTO DIFF WBC: CPT

## 2021-04-30 PROCEDURE — 25010000002 DIPHENHYDRAMINE PER 50 MG: Performed by: INTERNAL MEDICINE

## 2021-04-30 PROCEDURE — 80053 COMPREHEN METABOLIC PANEL: CPT

## 2021-04-30 PROCEDURE — 25010000002 RITUXIMAB 10 MG/ML SOLUTION 10 ML VIAL: Performed by: INTERNAL MEDICINE

## 2021-04-30 PROCEDURE — 25010000002 RITUXIMAB 10 MG/ML SOLUTION 50 ML VIAL: Performed by: INTERNAL MEDICINE

## 2021-04-30 PROCEDURE — 96415 CHEMO IV INFUSION ADDL HR: CPT

## 2021-04-30 PROCEDURE — 96375 TX/PRO/DX INJ NEW DRUG ADDON: CPT

## 2021-04-30 PROCEDURE — 96413 CHEMO IV INFUSION 1 HR: CPT

## 2021-04-30 RX ORDER — FAMOTIDINE 20 MG/1
20 TABLET, FILM COATED ORAL ONCE
Status: CANCELLED | OUTPATIENT
Start: 2021-04-30

## 2021-04-30 RX ORDER — ACETAMINOPHEN 325 MG/1
650 TABLET ORAL ONCE
Status: COMPLETED | OUTPATIENT
Start: 2021-04-30 | End: 2021-04-30

## 2021-04-30 RX ORDER — DIPHENHYDRAMINE HYDROCHLORIDE 50 MG/ML
50 INJECTION INTRAMUSCULAR; INTRAVENOUS AS NEEDED
Status: CANCELLED | OUTPATIENT
Start: 2021-04-30

## 2021-04-30 RX ORDER — FAMOTIDINE 20 MG/1
20 TABLET, FILM COATED ORAL ONCE
Status: COMPLETED | OUTPATIENT
Start: 2021-04-30 | End: 2021-04-30

## 2021-04-30 RX ORDER — MEPERIDINE HYDROCHLORIDE 50 MG/ML
25 INJECTION INTRAMUSCULAR; INTRAVENOUS; SUBCUTANEOUS
Status: CANCELLED | OUTPATIENT
Start: 2021-04-30 | End: 2021-05-01

## 2021-04-30 RX ORDER — SODIUM CHLORIDE 9 MG/ML
250 INJECTION, SOLUTION INTRAVENOUS ONCE
Status: CANCELLED | OUTPATIENT
Start: 2021-04-30

## 2021-04-30 RX ORDER — ACETAMINOPHEN 325 MG/1
650 TABLET ORAL ONCE
Status: CANCELLED | OUTPATIENT
Start: 2021-04-30

## 2021-04-30 RX ORDER — SODIUM CHLORIDE 9 MG/ML
250 INJECTION, SOLUTION INTRAVENOUS ONCE
Status: COMPLETED | OUTPATIENT
Start: 2021-04-30 | End: 2021-04-30

## 2021-04-30 RX ORDER — FAMOTIDINE 10 MG/ML
20 INJECTION, SOLUTION INTRAVENOUS AS NEEDED
Status: CANCELLED | OUTPATIENT
Start: 2021-04-30

## 2021-04-30 RX ADMIN — RITUXIMAB 830 MG: 10 INJECTION, SOLUTION INTRAVENOUS at 11:12

## 2021-04-30 RX ADMIN — FAMOTIDINE 20 MG: 20 TABLET, FILM COATED ORAL at 10:27

## 2021-04-30 RX ADMIN — SODIUM CHLORIDE 250 ML: 9 INJECTION, SOLUTION INTRAVENOUS at 10:27

## 2021-04-30 RX ADMIN — ACETAMINOPHEN 650 MG: 325 TABLET ORAL at 10:27

## 2021-04-30 RX ADMIN — DIPHENHYDRAMINE HYDROCHLORIDE 25 MG: 50 INJECTION, SOLUTION INTRAMUSCULAR; INTRAVENOUS at 10:28

## 2021-04-30 RX ADMIN — HYDROCORTISONE SODIUM SUCCINATE 100 MG: 100 INJECTION, POWDER, FOR SOLUTION INTRAMUSCULAR; INTRAVENOUS at 10:32

## 2021-04-30 NOTE — PROGRESS NOTES
Subjective .    REASONS FOR FOLLOWUP: Marginal zone lymphoma STAGE IV, induction therapy with Bendamustine and rituxan, great response, maintenance rituxan every 3 months x 2 years ongoing.    HISTORY OF PRESENT ILLNESS:    DURING THE VISIT WITH THE PATIENT TODAY , PATIENT HAD FACE MASK, MY MEDICAL ASSISTANT AND I  HAD PROPPER PROTECTIVE EQUIPMENT, AND I DID HAND HYGIENE WITH SOAP AND WATER BEFORE AND AFTER THE VISIT.    This patient returns today to the office for follow up stating that he continues doing fantastic. His appetite is excellent, his weight is stable, bowel function and urination are normal, no new cardiovascular or respiratory issues. No recent infections. 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 No peripheral adenopathy. No changes in performance status. He remains extremely busy playing golf and doing multiple repair work on his good old cars. He has had vascular studies by Dr. Rubio in regard to his abdominal aortic aneurysm and none of these issues have changed overall.             Past Medical History:   Diagnosis Date   • Abnormal ECG    • BPH (benign prostatic hyperplasia)    • Chronic kidney disease    • Disease of thyroid gland    • Hyperlipidemia    • Hyperparathyroidism (CMS/HCC) 2017    parathyroidectomy   • Hypertension    • Nicotine dependence    • TB (pulmonary tuberculosis)      Past Surgical History:   Procedure Laterality Date   • PARATHYROID GLAND SURGERY     • TRIGGER FINGER RELEASE      Left 5th finger     Family History   Problem Relation Age of Onset   • Heart disease Mother    • Stroke Mother    • Diabetes Mother    • Hypertension Sister    • Diabetes Sister    • Cancer Father    • Leukemia Father      Social History     Socioeconomic History   • Marital status:      Spouse name: Not on file   • Number of children: 2   • Years of education: High school   • Highest education level: High school graduate   Tobacco Use   • Smoking  status: Current Every Day Smoker     Packs/day: 0.50     Years: 60.00     Pack years: 30.00     Types: Cigarettes   • Smokeless tobacco: Never Used   Vaping Use   • Vaping Use: Never used   Substance and Sexual Activity   • Alcohol use: Yes     Alcohol/week: 2.0 standard drinks     Types: 2 Cans of beer per week     Comment: 2-3 beers 1-2 days a week   • Drug use: No     Comment: 2 cups coffee daily   • Sexual activity: Defer     Partners: Female       ONCOLOGIC HISTORY:  (History from previous dates can be found in the separate document.)    Current Outpatient Medications on File Prior to Visit   Medication Sig Dispense Refill   • acyclovir (ZOVIRAX) 400 MG tablet Take 1 tablet by mouth Daily. 60 tablet 5   • amLODIPine (NORVASC) 10 MG tablet TAKE 1 TABLET BY MOUTH EVERY DAY 90 tablet 0   • ascorbic acid (VITAMIN C) 100 MG tablet Take  by mouth.     • aspirin 81 MG tablet Take 81 mg by mouth.     • furosemide (LASIX) 20 MG tablet Take 20 mg by mouth Daily.     • HYDRALAZINE HCL PO Take 25 mg by mouth Daily.     • losartan (COZAAR) 100 MG tablet Take 100 mg by mouth Daily.     • Multiple Vitamins-Minerals (MULTIVITAL-M) tablet Take  by mouth Daily.     • Omega-3 Fatty Acids (FISH OIL) 1000 MG capsule capsule Take 1,000 mg by mouth Daily With Breakfast.     • ondansetron (ZOFRAN) 8 MG tablet Take 1 tablet by mouth 3 (Three) Times a Day As Needed for Nausea or Vomiting. 30 tablet 5   • VITAMIN E PO Take  by mouth.       No current facility-administered medications on file prior to visit.       ALLERGIES:     Allergies   Allergen Reactions   • Codeine Unknown - Low Severity     UNKNOWN           Cancer-related family history includes Cancer in his father.             Objective      There were no vitals filed for this visit.  Current Status 4/30/2021   ECOG score 0     Physical Exam:    I HAVE PERSONALLY REVIEWED THE HISTORY OF THE PRESENT ILLNESS, PAST MEDICAL HISTORY, FAMILY HISTORY, SOCIAL HISTORY, ALLERGIES,  MEDICATIONS STATED ABOVE IN THE OFFICE NOTE FROM TODAY.        GENERAL:  Well-developed, well-nourished  Patient  in no acute distress.   SKIN:  Warm, dry ,NO rashes,NO purpura ,NO petechiae.  HEENT:  Pupils were equal and reactive to light and accomodation, conjunctivae noninjected, no pterygium, normal extraocular movements, normal visual acuity.   NECK:  Supple with good range of motion; no thyromegaly or masses, no JVD or bruits, no cervical adenopathies.No carotid artery pain, no carotid abnormal pulsation , NO arterial dance.  LYMPHATICS:  No cervical, NO supraclavicular, NO axillary,NO epitrochlear , NO inguinal adenopathy.  CARDIAC   normal rate and regular rhythm, without murmur,NO rubs NO S3 NO S4 right or left . Normal femoral, popliteal, pedis, brachial and carotid pulses.  VASCULAR ARTERIAL: normal carotids,brachial,radial,femoral,popliteal, pedis pulses , no bruits.no paleness or cyanosis, no pain, no edema, no numbness, no gangrene.  VASCULAR VENOUS: no cyanosis, collateral circulation, varicosities, edema, palpable cords, pain, erythema.  ABDOMEN:  Soft, nontender with no hepatomegaly, no splenomegaly,no masses, no ascites, no collateral circulation,no distention,no Stanton sign, no abdominal pain, no inguinal hernias,no umbilical hernia, no abdominal bruits. Normal bowel sounds.  GENITAL: Not  Performed.  EXTREMITIES  AND SPINE:  No clubbing, cyanosis or edema, no deformities or pain .No kyphosis, scoliosis, deformities or pain in spine, ribs or pelvic bone.  NEUROLOGICAL:  Patient was awake, alert, oriented to time, person and place.                                RECENT LABS:CBC Auto Differential  Order: [id number] - Part of Panel Order [id number]  Status:  Final result   Visible to patient:  No (scheduled for 4/30/2021  9:49 AM)   Dx:  Small B-cell lymphoma of lymph nodes ...  Specimen Information: Blood        Component   Ref Range & Units 09:31   WBC   3.40 - 10.80 10*3/mm3 9.61    RBC   4.14 -  5.80 10*6/mm3 4.63    Hemoglobin   13.0 - 17.7 g/dL 14.2    Hematocrit   37.5 - 51.0 % 41.9    MCV   79.0 - 97.0 fL 90.5    MCH   26.6 - 33.0 pg 30.7    MCHC   31.5 - 35.7 g/dL 33.9    RDW   12.3 - 15.4 % 13.0    RDW-SD   37.0 - 54.0 fl 42.6    MPV   6.0 - 12.0 fL 11.1    Platelets   140 - 450 10*3/mm3 174    Neutrophil %   42.7 - 76.0 % 39.6Low     Lymphocyte %   19.6 - 45.3 % 43.8    Monocyte %   5.0 - 12.0 % 12.2High     Eosinophil %   0.3 - 6.2 % 2.3    Basophil %   0.0 - 1.5 % 1.0    Immature Grans %   0.0 - 0.5 % 1.1High     Neutrophils, Absolute   1.70 - 7.00 10*3/mm3 3.80    Lymphocytes, Absolute   0.70 - 3.10 10*3/mm3 4.21High     Monocytes, Absolute   0.10 - 0.90 10*3/mm3 1.17High     Eosinophils, Absolute   0.00 - 0.40 10*3/mm3 0.22                            Assessment/Plan    1. Marginal zone lymphoma.  · Peripheral blood flow cytometry CD5/CD10/CD23 negative; positive for p53 and 13 Q deletion, negative for KAREN and trisomy 12.  · Bone marrow evaluation demonstrated involvement by low-grade B-cell lymphoma classified again as marginal zone lymphoma occupying 75% of the total marrow.  · Patient has no B symptoms, no bulky disease.    · Significant leukocytosis with WBC count for example today being 168,000.  · Patient already taking allopurinol 300 mg daily.  · Bendamustine/Rituxan initiated, 7/23/2019.  After receiving only 7 mL's of Rituxan the patient had a reaction, specifically he developed initially feelings of warmth and nausea.  He was given additional 20mg of IV Pepcid.  Patient then began reporting changes in vision as well as diaphoresis, and near syncopal.  He was given additional 200 mg of Solu-Cortef.  The patient was monitored.  We decided not to give further Rituxan that day, but he was brought back on day #2 and was able to complete his Rituxan without difficulty.  · 8/20/2019 due for cycle 2 months bendamustine /Rituxan, delayed due to slightly elevated creatinine 1.9 patient received  IV hydration.  Improvement in absolute lymphocyte count to 39.11.   · Cycle 2 given 8/26/2019.    The patient completed his bendamustine/Rituxan therapy and now he is receiving Rituxan maintenance for 2 years to be completed in September 2021  The patient was further reviewed on 11/02/2020. Overall from the point of view of his lymphoma he has no symptoms pertinent to this at all. His appetite and weight remain stable, his bowel function and urination remain normal. He has no B symptoms, he has no peripheral adenopathy or hepatosplenomegaly. His white count, hemoglobin and platelets remain normal. In his peripheral blood he still has a minimal element of lymphocytosis. We have done on many occasions peripheral blood flow cytometry failing to document any monoclonal population of lymphocytes after the patient had treatment with bendamustine and Rituxan.   On 04/30/2021 the patient was further reviewed. He is completely asymptomatic at this time. He has no peripheral adenopathy, hepatosplenomegaly, no B symptoms or anything that would suggest progressive lymphoma. On the other hand in peripheral blood there is still an element of lymphocytosis that is not changing too much since completion of his therapy with bendamustine/Rituxan and this has been evaluated in 2019 but with peripheral blood flow cytometry showing no monoclonal population of lymphocytes. I am pending to review another peripheral blood exam today. Again because the patient is asymptomatic with a normal hemoglobin, normal platelet count and a normal total white count and remind ourselves that his total white count was in the thousands at the time of diagnosis I do believe that his disease remains in remission. Therefore my advice to him is as follows:  1. He will proceed today with his Rituxan infusion as usual.  2. He will return to see us in 3 months with a CBC, CMP and continue his Rituxan infusion at that time. The last Rituxan infusion probably will  be in 09/2021 or 10/2021.     I find no need for radiological assessment on him at this time.    I have reviewed his vascular studies in regard to his abdominal aortic aneurysm and this has not changed whatsoever. That is good news. The patient looks and feels terrific and we will let him to keep going in this way.         Vic Herrera MD            Cc:  No ref. provider found

## 2021-04-30 NOTE — PROGRESS NOTES
Subjective .    REASONS FOR FOLLOWUP: Marginal zone lymphoma STAGE IV    HISTORY OF PRESENT ILLNESS:    PATIENT WAS CALLED THE DAY BEFORE BY THE OFFICE TO ASK FOR SYMPTOMS THAT COULD BE CONSISTENT WITH CORONAVIRUS INFECTION, AND BEING NEGATIVE WAS SCHEDULED TO BE SEEN IN THE OFFICE TODAY. SIMILAR QUESTIONING TODAY INCLUDING, CHILLS, FEVER, NEW COUGH, SHORTNESS OF BREATH, DIARRHEA,DIFFUSE BODY ACHES  AND CHANGES IN SMELL OR TASTE WERE NEGATIVE.THE PATIENT DENIED ANY CONTACT WITH PERSONS WHO WERE POSITIVE FOR COVID, AND PATIENT IS NOT IN CATEGORY OF HIGH RISK BEHAVIOR TO ACQUIRE COVID.    DURING THE VISIT WITH THE PATIENT TODAY , PATIENT HAD FACE MASK, MY MEDICAL ASSISTANT AND I  HAD PROPPER PROTECTIVE EQUIPMENT, AND I DID HAND HYGIENE WITH SOAP AND WATER BEFORE AND AFTER THE VISIT.                Past Medical History:   Diagnosis Date   • Abnormal ECG    • BPH (benign prostatic hyperplasia)    • Chronic kidney disease    • Disease of thyroid gland    • Hyperlipidemia    • Hyperparathyroidism (CMS/HCC) 2017    parathyroidectomy   • Hypertension    • Nicotine dependence    • TB (pulmonary tuberculosis)      Past Surgical History:   Procedure Laterality Date   • PARATHYROID GLAND SURGERY     • TRIGGER FINGER RELEASE      Left 5th finger     Family History   Problem Relation Age of Onset   • Heart disease Mother    • Stroke Mother    • Diabetes Mother    • Hypertension Sister    • Diabetes Sister    • Cancer Father    • Leukemia Father      Social History     Socioeconomic History   • Marital status:      Spouse name: Not on file   • Number of children: 2   • Years of education: High school   • Highest education level: High school graduate   Tobacco Use   • Smoking status: Current Every Day Smoker     Packs/day: 0.50     Years: 60.00     Pack years: 30.00     Types: Cigarettes   • Smokeless tobacco: Never Used   Vaping Use   • Vaping Use: Never used   Substance and Sexual Activity   • Alcohol use: Yes      "Alcohol/week: 2.0 standard drinks     Types: 2 Cans of beer per week     Comment: 2-3 beers 1-2 days a week   • Drug use: No     Comment: 2 cups coffee daily   • Sexual activity: Defer     Partners: Female       ONCOLOGIC HISTORY:  (History from previous dates can be found in the separate document.)    Current Outpatient Medications on File Prior to Visit   Medication Sig Dispense Refill   • acyclovir (ZOVIRAX) 400 MG tablet Take 1 tablet by mouth Daily. 60 tablet 5   • amLODIPine (NORVASC) 10 MG tablet TAKE 1 TABLET BY MOUTH EVERY DAY 90 tablet 0   • ascorbic acid (VITAMIN C) 100 MG tablet Take  by mouth.     • aspirin 81 MG tablet Take 81 mg by mouth.     • furosemide (LASIX) 20 MG tablet Take 20 mg by mouth Daily.     • HYDRALAZINE HCL PO Take 25 mg by mouth Daily.     • losartan (COZAAR) 100 MG tablet Take 100 mg by mouth Daily.     • Multiple Vitamins-Minerals (MULTIVITAL-M) tablet Take  by mouth Daily.     • Omega-3 Fatty Acids (FISH OIL) 1000 MG capsule capsule Take 1,000 mg by mouth Daily With Breakfast.     • ondansetron (ZOFRAN) 8 MG tablet Take 1 tablet by mouth 3 (Three) Times a Day As Needed for Nausea or Vomiting. 30 tablet 5   • VITAMIN E PO Take  by mouth.       No current facility-administered medications on file prior to visit.       ALLERGIES:     Allergies   Allergen Reactions   • Codeine Unknown - Low Severity     UNKNOWN           Cancer-related family history includes Cancer in his father.             Objective      Vitals:    04/30/21 0946   BP: 139/66   Pulse: 55   Resp: 19   Temp: 97.3 °F (36.3 °C)   TempSrc: Temporal   SpO2: 97%   Weight: 105 kg (231 lb 9.6 oz)   Height: 182.9 cm (72.01\")   PainSc: 0-No pain     Current Status 4/30/2021   ECOG score 0     Physical Exam:                                RECENT LABS:                Assessment/Plan    1. Marginal zone lymphoma.  · Peripheral blood flow cytometry CD5/CD10/CD23 negative; positive for p53 and 13 Q deletion, negative for KAREN and " trisomy 12.  · Bone marrow evaluation demonstrated involvement by low-grade B-cell lymphoma classified again as marginal zone lymphoma occupying 75% of the total marrow.  · Patient has no B symptoms, no bulky disease.    · Significant leukocytosis with WBC count for example today being 168,000.  · Patient already taking allopurinol 300 mg daily.  · Bendamustine/Rituxan initiated, 7/23/2019.  After receiving only 7 mL's of Rituxan the patient had a reaction, specifically he developed initially feelings of warmth and nausea.  He was given additional 20mg of IV Pepcid.  Patient then began reporting changes in vision as well as diaphoresis, and near syncopal.  He was given additional 200 mg of Solu-Cortef.  The patient was monitored.  We decided not to give further Rituxan that day, but he was brought back on day #2 and was able to complete his Rituxan without difficulty.  · 8/20/2019 due for cycle 2 months bendamustine /Rituxan, delayed due to slightly elevated creatinine 1.9 patient received IV hydration.  Improvement in absolute lymphocyte count to 39.11.   · Cycle 2 given 8/26/2019.    The patient completed his bendamustine/Rituxan therapy and now he is receiving Rituxan maintenance for 2 years to be completed in September 2021  The patient was further reviewed on 11/02/2020. Overall from the point of view of his lymphoma he has no symptoms pertinent to this at all. His appetite and weight remain stable, his bowel function and urination remain normal. He has no B symptoms, he has no peripheral adenopathy or hepatosplenomegaly. His white count, hemoglobin and platelets remain normal. In his peripheral blood he still has a minimal element of lymphocytosis. We have done on many occasions peripheral blood flow cytometry failing to document any monoclonal population of lymphocytes after the patient had treatment with bendamustine and Rituxan.     I think the patient continues doing well.     RECOMMENDATIONS:  1. He will  proceed with his Rituxan today and he will return to have Rituxan again in 3 months with a CBC and a CMP. He will need to fly back from [place] at that point and he does not mind.     The patient will be completing his Rituxan maintenance in 09/2021.     I find no need for radiological assessment on him at this time.    He has been seen by his nephrologist recently and his creatinine remains stable, it has been our observation in that regard.       I DISCUSSED WITH PATIENT IN DETAIL FORMS TO DECREASE CHANCES OF CORONAVIRUS INFECTION INCLUDING ISOLATION, PROPER HAND HIGIENE, AVOID PUBLIC PLACES  WITH CROWDS, FOLLOW  CDC RECOMENDATIONS, AND KEEP PERSONAL AND SOCIAL RESPONSIBILITY, WARE A MASK IN PUBLIC PLACES.  PATIENT IS AWARE THIS INFECTION COULD HAVE SEVERE CONSEQUENCES TO PERSONAL HEALTH AND FAMILY RAMIFICATIONS OF THIS.  Samia Higuera MA            Cc:  No ref. provider found

## 2021-05-14 ENCOUNTER — TELEPHONE (OUTPATIENT)
Dept: ONCOLOGY | Facility: CLINIC | Age: 81
End: 2021-05-14

## 2021-05-14 NOTE — TELEPHONE ENCOUNTER
"Called patient to get more information about \"flag A\" on lab results. I let him know that we are not sure what that means and if he could show me where he is seeing this. He was referring to the UA which stated cloudy/leucocytes in the urine that was showing abnormal. He stated that his other MD called him and started an antibiotic. I assured him that it was nothing to worry about and continue instructions from his MD who is managing this UTI. Patient v/u with no other questions.   "

## 2021-07-21 ENCOUNTER — TELEPHONE (OUTPATIENT)
Dept: ONCOLOGY | Facility: CLINIC | Age: 81
End: 2021-07-21

## 2021-07-21 NOTE — TELEPHONE ENCOUNTER
----- Message from Cherri Patricia RN sent at 7/21/2021  7:28 AM EDT -----  Regarding: move to lab  Please move to lab for Friday appt. Thanks

## 2021-07-23 ENCOUNTER — LAB (OUTPATIENT)
Dept: ONCOLOGY | Facility: HOSPITAL | Age: 81
End: 2021-07-23

## 2021-07-23 ENCOUNTER — APPOINTMENT (OUTPATIENT)
Dept: ONCOLOGY | Facility: HOSPITAL | Age: 81
End: 2021-07-23

## 2021-07-23 ENCOUNTER — INFUSION (OUTPATIENT)
Dept: ONCOLOGY | Facility: HOSPITAL | Age: 81
End: 2021-07-23

## 2021-07-23 ENCOUNTER — OFFICE VISIT (OUTPATIENT)
Dept: ONCOLOGY | Facility: CLINIC | Age: 81
End: 2021-07-23

## 2021-07-23 VITALS
HEART RATE: 52 BPM | OXYGEN SATURATION: 97 % | TEMPERATURE: 96.6 F | DIASTOLIC BLOOD PRESSURE: 74 MMHG | SYSTOLIC BLOOD PRESSURE: 170 MMHG | WEIGHT: 227.2 LBS | HEIGHT: 72 IN | BODY MASS INDEX: 30.77 KG/M2 | RESPIRATION RATE: 16 BRPM

## 2021-07-23 VITALS — DIASTOLIC BLOOD PRESSURE: 68 MMHG | HEART RATE: 49 BPM | SYSTOLIC BLOOD PRESSURE: 146 MMHG

## 2021-07-23 DIAGNOSIS — C83.08 SMALL B-CELL LYMPHOMA OF LYMPH NODES OF MULTIPLE REGIONS (HCC): Primary | ICD-10-CM

## 2021-07-23 DIAGNOSIS — Z79.899 HIGH RISK MEDICATION USE: ICD-10-CM

## 2021-07-23 DIAGNOSIS — C83.08 SMALL B-CELL LYMPHOMA OF LYMPH NODES OF MULTIPLE REGIONS (HCC): ICD-10-CM

## 2021-07-23 LAB
ALBUMIN SERPL-MCNC: 4.2 G/DL (ref 3.5–5.2)
ALBUMIN/GLOB SERPL: 1.9 G/DL (ref 1.1–2.4)
ALP SERPL-CCNC: 104 U/L (ref 38–116)
ALT SERPL W P-5'-P-CCNC: 15 U/L (ref 0–41)
ANION GAP SERPL CALCULATED.3IONS-SCNC: 13.2 MMOL/L (ref 5–15)
AST SERPL-CCNC: 18 U/L (ref 0–40)
BASOPHILS # BLD AUTO: 0.04 10*3/MM3 (ref 0–0.2)
BASOPHILS NFR BLD AUTO: 0.5 % (ref 0–1.5)
BILIRUB SERPL-MCNC: 0.4 MG/DL (ref 0.2–1.2)
BUN SERPL-MCNC: 19 MG/DL (ref 6–20)
BUN/CREAT SERPL: 11.3 (ref 7.3–30)
CALCIUM SPEC-SCNC: 9.1 MG/DL (ref 8.5–10.2)
CHLORIDE SERPL-SCNC: 104 MMOL/L (ref 98–107)
CO2 SERPL-SCNC: 21.8 MMOL/L (ref 22–29)
CREAT SERPL-MCNC: 1.68 MG/DL (ref 0.7–1.3)
DEPRECATED RDW RBC AUTO: 43.5 FL (ref 37–54)
EOSINOPHIL # BLD AUTO: 0.14 10*3/MM3 (ref 0–0.4)
EOSINOPHIL NFR BLD AUTO: 1.9 % (ref 0.3–6.2)
ERYTHROCYTE [DISTWIDTH] IN BLOOD BY AUTOMATED COUNT: 12.5 % (ref 12.3–15.4)
GFR SERPL CREATININE-BSD FRML MDRD: 40 ML/MIN/1.73
GLOBULIN UR ELPH-MCNC: 2.2 GM/DL (ref 1.8–3.5)
GLUCOSE SERPL-MCNC: 198 MG/DL (ref 74–124)
HCT VFR BLD AUTO: 40.6 % (ref 37.5–51)
HGB BLD-MCNC: 13 G/DL (ref 13–17.7)
IMM GRANULOCYTES # BLD AUTO: 0.02 10*3/MM3 (ref 0–0.05)
IMM GRANULOCYTES NFR BLD AUTO: 0.3 % (ref 0–0.5)
LYMPHOCYTES # BLD AUTO: 2.69 10*3/MM3 (ref 0.7–3.1)
LYMPHOCYTES NFR BLD AUTO: 36.7 % (ref 19.6–45.3)
MCH RBC QN AUTO: 30.6 PG (ref 26.6–33)
MCHC RBC AUTO-ENTMCNC: 32 G/DL (ref 31.5–35.7)
MCV RBC AUTO: 95.5 FL (ref 79–97)
MONOCYTES # BLD AUTO: 0.65 10*3/MM3 (ref 0.1–0.9)
MONOCYTES NFR BLD AUTO: 8.9 % (ref 5–12)
NEUTROPHILS NFR BLD AUTO: 3.79 10*3/MM3 (ref 1.7–7)
NEUTROPHILS NFR BLD AUTO: 51.7 % (ref 42.7–76)
NRBC BLD AUTO-RTO: 0 /100 WBC (ref 0–0.2)
PLATELET # BLD AUTO: 151 10*3/MM3 (ref 140–450)
PMV BLD AUTO: 11.1 FL (ref 6–12)
POTASSIUM SERPL-SCNC: 4 MMOL/L (ref 3.5–4.7)
PROT SERPL-MCNC: 6.4 G/DL (ref 6.3–8)
RBC # BLD AUTO: 4.25 10*6/MM3 (ref 4.14–5.8)
SODIUM SERPL-SCNC: 139 MMOL/L (ref 134–145)
WBC # BLD AUTO: 7.33 10*3/MM3 (ref 3.4–10.8)

## 2021-07-23 PROCEDURE — 25010000002 RITUXIMAB 10 MG/ML SOLUTION 50 ML VIAL: Performed by: NURSE PRACTITIONER

## 2021-07-23 PROCEDURE — 85025 COMPLETE CBC W/AUTO DIFF WBC: CPT

## 2021-07-23 PROCEDURE — 25010000002 RITUXIMAB 10 MG/ML SOLUTION 10 ML VIAL: Performed by: NURSE PRACTITIONER

## 2021-07-23 PROCEDURE — 36415 COLL VENOUS BLD VENIPUNCTURE: CPT

## 2021-07-23 PROCEDURE — 25010000002 DIPHENHYDRAMINE PER 50 MG: Performed by: NURSE PRACTITIONER

## 2021-07-23 PROCEDURE — 96375 TX/PRO/DX INJ NEW DRUG ADDON: CPT

## 2021-07-23 PROCEDURE — 99214 OFFICE O/P EST MOD 30 MIN: CPT | Performed by: NURSE PRACTITIONER

## 2021-07-23 PROCEDURE — 96415 CHEMO IV INFUSION ADDL HR: CPT

## 2021-07-23 PROCEDURE — 96413 CHEMO IV INFUSION 1 HR: CPT

## 2021-07-23 PROCEDURE — 80053 COMPREHEN METABOLIC PANEL: CPT

## 2021-07-23 RX ORDER — ACETAMINOPHEN 325 MG/1
650 TABLET ORAL ONCE
Status: CANCELLED | OUTPATIENT
Start: 2021-07-23

## 2021-07-23 RX ORDER — FAMOTIDINE 20 MG/1
20 TABLET, FILM COATED ORAL ONCE
Status: CANCELLED | OUTPATIENT
Start: 2021-07-23

## 2021-07-23 RX ORDER — SODIUM CHLORIDE 9 MG/ML
250 INJECTION, SOLUTION INTRAVENOUS ONCE
Status: COMPLETED | OUTPATIENT
Start: 2021-07-23 | End: 2021-07-23

## 2021-07-23 RX ORDER — DIPHENHYDRAMINE HYDROCHLORIDE 50 MG/ML
50 INJECTION INTRAMUSCULAR; INTRAVENOUS AS NEEDED
Status: CANCELLED | OUTPATIENT
Start: 2021-07-23

## 2021-07-23 RX ORDER — FAMOTIDINE 20 MG/1
20 TABLET, FILM COATED ORAL ONCE
Status: COMPLETED | OUTPATIENT
Start: 2021-07-23 | End: 2021-07-23

## 2021-07-23 RX ORDER — FAMOTIDINE 10 MG/ML
20 INJECTION, SOLUTION INTRAVENOUS AS NEEDED
Status: CANCELLED | OUTPATIENT
Start: 2021-07-23

## 2021-07-23 RX ORDER — SODIUM CHLORIDE 9 MG/ML
250 INJECTION, SOLUTION INTRAVENOUS ONCE
Status: CANCELLED | OUTPATIENT
Start: 2021-07-23

## 2021-07-23 RX ORDER — ACETAMINOPHEN 325 MG/1
650 TABLET ORAL ONCE
Status: COMPLETED | OUTPATIENT
Start: 2021-07-23 | End: 2021-07-23

## 2021-07-23 RX ADMIN — DIPHENHYDRAMINE HYDROCHLORIDE 25 MG: 50 INJECTION, SOLUTION INTRAMUSCULAR; INTRAVENOUS at 10:13

## 2021-07-23 RX ADMIN — SODIUM CHLORIDE 250 ML: 9 INJECTION, SOLUTION INTRAVENOUS at 10:11

## 2021-07-23 RX ADMIN — RITUXIMAB 830 MG: 10 INJECTION, SOLUTION INTRAVENOUS at 11:08

## 2021-07-23 RX ADMIN — ACETAMINOPHEN 650 MG: 325 TABLET ORAL at 10:11

## 2021-07-23 RX ADMIN — FAMOTIDINE 20 MG: 20 TABLET ORAL at 10:11

## 2021-07-23 NOTE — PROGRESS NOTES
Subjective .    REASONS FOR FOLLOWUP:   1. Marginal zone lymphoma bendamustine Rituxan with great response continuing on maintenance Rituxan every 3 months x 2 years.    HISTORY OF PRESENT ILLNESS:  The patient is a 80 y.o. year old male with the above-mentioned history, he returns the office today in anticipation of maintenance Rituxan which he continues to receive every 3 months.  This will be completed October 2021.  He tolerates Rituxan remarkably well.  He reports he is overall feeling well.  He denies B symptoms including weight loss, fatigue, night sweats.  He denies any new palpable adenopathy.  His appetite is unchanged.  His bowels are moving normally.  He did recently follow-up with urology with medication adjustments made due to urinary retention.  He also reports his new family care provider referred him to gastroenterology for screening colonoscopy after positive Cologuard.  He denies blood in his stool or change in his bowel habits.  He is afebrile without signs or symptoms of infection.  He did receive his Covid vaccines.      Past Medical History:   Diagnosis Date   • Abnormal ECG    • BPH (benign prostatic hyperplasia)    • Chronic kidney disease    • Disease of thyroid gland    • Hyperlipidemia    • Hyperparathyroidism (CMS/HCC) 2017    parathyroidectomy   • Hypertension    • Nicotine dependence    • TB (pulmonary tuberculosis)        ONCOLOGIC HISTORY:  (History from previous dates can be found in the separate document.)    Current Outpatient Medications on File Prior to Visit   Medication Sig Dispense Refill   • ascorbic acid (VITAMIN C) 100 MG tablet Take  by mouth.     • aspirin 81 MG tablet Take 81 mg by mouth.     • hydrALAZINE (APRESOLINE) 25 MG tablet Take 25 mg by mouth Daily.     • levoFLOXacin (LEVAQUIN) 250 MG tablet Take 250 mg by mouth Daily.     • losartan (COZAAR) 100 MG tablet Take 100 mg by mouth Daily.     • metoprolol tartrate (LOPRESSOR) 25 MG tablet Take 25 mg by mouth.      • Multiple Vitamins-Minerals (MULTIVITAL-M) tablet Take  by mouth Daily.     • Omega-3 Fatty Acids (FISH OIL) 1000 MG capsule capsule Take 1,000 mg by mouth Daily With Breakfast.     • VITAMIN E PO Take  by mouth.       Current Facility-Administered Medications on File Prior to Visit   Medication Dose Route Frequency Provider Last Rate Last Admin   • [COMPLETED] acetaminophen (TYLENOL) tablet 650 mg  650 mg Oral Once Soumya Brar APRN   650 mg at 07/23/21 1011   • [COMPLETED] diphenhydrAMINE (BENADRYL) IVPB 25 mg  25 mg Intravenous Once Soumya Brar APRN 240 mL/hr at 07/23/21 1013 25 mg at 07/23/21 1013   • [COMPLETED] famotidine (PEPCID) tablet 20 mg  20 mg Oral Once Soumya Brar APRN   20 mg at 07/23/21 1011   • hydrocortisone sodium succinate (Solu-CORTEF) injection 100 mg  100 mg Intravenous PRN Soumya Brar APRN       • riTUXimab (RITUXAN) 830 mg in sodium chloride 0.9 % 333 mL chemo IVPB  375 mg/m2 (Treatment Plan Recorded) Intravenous Once Soumya Brar APRN       • [COMPLETED] sodium chloride 0.9 % infusion 250 mL  250 mL Intravenous Once Soumya Brar APRN 20 mL/hr at 07/23/21 1011 250 mL at 07/23/21 1011       ALLERGIES:     Allergies   Allergen Reactions   • Codeine Unknown - Low Severity     UNKNOWN         Social History     Socioeconomic History   • Marital status:      Spouse name: Not on file   • Number of children: 2   • Years of education: High school   • Highest education level: High school graduate   Tobacco Use   • Smoking status: Current Every Day Smoker     Packs/day: 0.50     Years: 60.00     Pack years: 30.00     Types: Cigarettes   • Smokeless tobacco: Never Used   Vaping Use   • Vaping Use: Never used   Substance and Sexual Activity   • Alcohol use: Yes     Alcohol/week: 2.0 standard drinks     Types: 2 Cans of beer per week     Comment: 2-3 beers 1-2 days a week   • Drug use: No     Comment: 2 cups coffee daily  "  • Sexual activity: Defer     Partners: Female       Objective      Vitals:    07/23/21 0916   BP: 170/74  Comment: Pt took metropolol at 8am had two cups of coffee   Pulse: 52   Resp: 16   Temp: 96.6 °F (35.9 °C)   TempSrc: Skin   SpO2: 97%   Weight: 103 kg (227 lb 3.2 oz)   Height: 182.9 cm (72.01\")   PainSc: 0-No pain     Current Status 7/23/2021   ECOG score 0     Physical Exam   Constitutional: He is oriented to person, place, and time. He appears well-developed. No distress.   HENT:   Head: Normocephalic and atraumatic.   Mouth/Throat: No oropharyngeal exudate.   Eyes: Pupils are equal, round, and reactive to light.   Cardiovascular: Normal rate, regular rhythm and normal heart sounds.   No murmur heard.  Pulmonary/Chest: Effort normal and breath sounds normal. No respiratory distress. He has no wheezes. He has no rhonchi. He has no rales.   Abdominal: Soft. Normal appearance and bowel sounds are normal. He exhibits no distension.   Musculoskeletal: Normal range of motion.   Neurological: He is alert and oriented to person, place, and time.   Skin: Skin is warm and dry. No rash noted.   Vitals reviewed.    I have reexamined the patient and the results are consistent with the previously documented exam. ASHELY Ventura     RECENT LABS:  Results from last 7 days   Lab Units 07/23/21  0906   WBC 10*3/mm3 7.33   NEUTROS ABS 10*3/mm3 3.79   HEMOGLOBIN g/dL 13.0   HEMATOCRIT % 40.6   PLATELETS 10*3/mm3 151     Results from last 7 days   Lab Units 07/23/21  0906   SODIUM mmol/L 139   POTASSIUM mmol/L 4.0   CHLORIDE mmol/L 104   CO2 mmol/L 21.8*   BUN mg/dL 19   CREATININE mg/dL 1.68*   CALCIUM mg/dL 9.1   ALBUMIN g/dL 4.20   BILIRUBIN mg/dL 0.4   ALK PHOS U/L 104   ALT (SGPT) U/L 15   AST (SGOT) U/L 18   GLUCOSE mg/dL 198*           Assessment/Plan    1. Marginal zone lymphoma.  · Peripheral blood flow cytometry CD5/CD10/CD23 negative; positive for p53 and 13 Q deletion, negative for KAREN and trisomy " 12.  · Bone marrow evaluation demonstrated involvement by low-grade B-cell lymphoma classified again as marginal zone lymphoma occupying 75% of the total marrow.  · Patient has no B symptoms, no bulky disease.    · Significant leukocytosis with WBC count for example today being 168,000.  · Patient already taking allopurinol 300 mg daily.  · Bendamustine/Rituxan initiated, 7/23/2019.  After receiving only 7 mL's of Rituxan the patient had a reaction, specifically he developed initially feelings of warmth and nausea.  He was given additional 20mg of IV Pepcid.  Patient then began reporting changes in vision as well as diaphoresis, and near syncopal.  He was given additional 200 mg of Solu-Cortef.  The patient was monitored.  We decided not to give further Rituxan that day, but he was brought back on day #2 and was able to complete his Rituxan without difficulty.  · 8/20/2019 due for cycle 2 months bendamustine /Rituxan, delayed due to slightly elevated creatinine 1.9 patient received IV hydration.  Improvement in absolute lymphocyte count to 39.11.   · Completed bendamustine Rituxan now receives maintenance Rituxan every 3 months x 2 years which will be complete October 2021  · Proceed today, 7/23/2021 with maintenance Rituxan.  Clinically, no evidence of recurrent disease, no need for repeat imaging at this time    2.  CKD, followed by Dr. Parekh, nephrology.  Patient has had a creatinine around 1.6-1.8 for the last 3 years at least per review of records.    · Creatinine at baseline today at 1.68    3.  Abdominal otic aneurysm, followed by Dr. 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  Continue to monitor.    5.  Health maintenance.  · Positive Cologuard through primary care, the patient has been referred to gastroenterology for screening colonoscopy.  He reports it has been greater than 20 years since his last colonoscopy though did have a negative  Angela several years ago    PLAN:  1. Proceed today with maintenance Rituxan  2. Currently no evidence of recurrent disease with review of labs and physical exam  3. Return in 3 months for final planned maintenance Rituxan, CBC, CMP, MD follow-up  4. Proceed with colonoscopy as scheduled through primary care    The patient is on a high risk medication requiring close monitoring for toxicity    Soumya Brar, ASHELY  07/23/2021

## 2021-10-15 ENCOUNTER — INFUSION (OUTPATIENT)
Dept: ONCOLOGY | Facility: HOSPITAL | Age: 81
End: 2021-10-15

## 2021-10-15 ENCOUNTER — OFFICE VISIT (OUTPATIENT)
Dept: ONCOLOGY | Facility: CLINIC | Age: 81
End: 2021-10-15

## 2021-10-15 VITALS
SYSTOLIC BLOOD PRESSURE: 156 MMHG | RESPIRATION RATE: 18 BRPM | DIASTOLIC BLOOD PRESSURE: 69 MMHG | BODY MASS INDEX: 31.17 KG/M2 | WEIGHT: 230.1 LBS | HEIGHT: 72 IN | TEMPERATURE: 97.7 F | OXYGEN SATURATION: 98 % | HEART RATE: 56 BPM

## 2021-10-15 DIAGNOSIS — C83.08 SMALL B-CELL LYMPHOMA OF LYMPH NODES OF MULTIPLE REGIONS (HCC): Primary | ICD-10-CM

## 2021-10-15 LAB
ALBUMIN SERPL-MCNC: 4.6 G/DL (ref 3.5–5.2)
ALBUMIN/GLOB SERPL: 2.4 G/DL (ref 1.1–2.4)
ALP SERPL-CCNC: 94 U/L (ref 38–116)
ALT SERPL W P-5'-P-CCNC: 19 U/L (ref 0–41)
ANION GAP SERPL CALCULATED.3IONS-SCNC: 12.1 MMOL/L (ref 5–15)
AST SERPL-CCNC: 22 U/L (ref 0–40)
BASOPHILS # BLD AUTO: 0.07 10*3/MM3 (ref 0–0.2)
BASOPHILS NFR BLD AUTO: 0.9 % (ref 0–1.5)
BILIRUB SERPL-MCNC: 0.4 MG/DL (ref 0.2–1.2)
BUN SERPL-MCNC: 19 MG/DL (ref 6–20)
BUN/CREAT SERPL: 12.8 (ref 7.3–30)
CALCIUM SPEC-SCNC: 9.3 MG/DL (ref 8.5–10.2)
CHLORIDE SERPL-SCNC: 104 MMOL/L (ref 98–107)
CO2 SERPL-SCNC: 21.9 MMOL/L (ref 22–29)
CREAT SERPL-MCNC: 1.48 MG/DL (ref 0.7–1.3)
DEPRECATED RDW RBC AUTO: 42.3 FL (ref 37–54)
EOSINOPHIL # BLD AUTO: 0.14 10*3/MM3 (ref 0–0.4)
EOSINOPHIL NFR BLD AUTO: 1.8 % (ref 0.3–6.2)
ERYTHROCYTE [DISTWIDTH] IN BLOOD BY AUTOMATED COUNT: 12.3 % (ref 12.3–15.4)
GFR SERPL CREATININE-BSD FRML MDRD: 46 ML/MIN/1.73
GLOBULIN UR ELPH-MCNC: 1.9 GM/DL (ref 1.8–3.5)
GLUCOSE SERPL-MCNC: 115 MG/DL (ref 74–124)
HCT VFR BLD AUTO: 40.1 % (ref 37.5–51)
HGB BLD-MCNC: 13.2 G/DL (ref 13–17.7)
IMM GRANULOCYTES # BLD AUTO: 0.03 10*3/MM3 (ref 0–0.05)
IMM GRANULOCYTES NFR BLD AUTO: 0.4 % (ref 0–0.5)
LYMPHOCYTES # BLD AUTO: 3.24 10*3/MM3 (ref 0.7–3.1)
LYMPHOCYTES NFR BLD AUTO: 40.6 % (ref 19.6–45.3)
MCH RBC QN AUTO: 30.6 PG (ref 26.6–33)
MCHC RBC AUTO-ENTMCNC: 32.9 G/DL (ref 31.5–35.7)
MCV RBC AUTO: 92.8 FL (ref 79–97)
MONOCYTES # BLD AUTO: 0.94 10*3/MM3 (ref 0.1–0.9)
MONOCYTES NFR BLD AUTO: 11.8 % (ref 5–12)
NEUTROPHILS NFR BLD AUTO: 3.56 10*3/MM3 (ref 1.7–7)
NEUTROPHILS NFR BLD AUTO: 44.5 % (ref 42.7–76)
NRBC BLD AUTO-RTO: 0 /100 WBC (ref 0–0.2)
PLATELET # BLD AUTO: 146 10*3/MM3 (ref 140–450)
PMV BLD AUTO: 11 FL (ref 6–12)
POTASSIUM SERPL-SCNC: 4.3 MMOL/L (ref 3.5–4.7)
PROT SERPL-MCNC: 6.5 G/DL (ref 6.3–8)
RBC # BLD AUTO: 4.32 10*6/MM3 (ref 4.14–5.8)
SODIUM SERPL-SCNC: 138 MMOL/L (ref 134–145)
WBC # BLD AUTO: 7.98 10*3/MM3 (ref 3.4–10.8)

## 2021-10-15 PROCEDURE — G0463 HOSPITAL OUTPT CLINIC VISIT: HCPCS

## 2021-10-15 PROCEDURE — 99214 OFFICE O/P EST MOD 30 MIN: CPT | Performed by: INTERNAL MEDICINE

## 2021-10-15 PROCEDURE — 36415 COLL VENOUS BLD VENIPUNCTURE: CPT | Performed by: INTERNAL MEDICINE

## 2021-10-15 PROCEDURE — 80053 COMPREHEN METABOLIC PANEL: CPT

## 2021-10-15 PROCEDURE — 85025 COMPLETE CBC W/AUTO DIFF WBC: CPT

## 2021-10-15 NOTE — PROGRESS NOTES
Subjective .    REASONS FOR FOLLOWUP: Marginal zone lymphoma STAGE IV, induction therapy with Bendamustine and rituxan, great response, maintenance rituxan every 3 months x 2 years ongoing.    HISTORY OF PRESENT ILLNESS:    DURING THE VISIT WITH THE PATIENT TODAY , PATIENT HAD FACE MASK, MY MEDICAL ASSISTANT AND I  HAD PROPPER PROTECTIVE EQUIPMENT, AND I DID HAND HYGIENE WITH SOAP AND WATER BEFORE AND AFTER THE VISIT.      This is pertinent to his small cell lymphocytic lymphoma with leukocytosis being treated with Rituxan. The patient is supposed to complete today 2 years of maintenance Rituxan. I have discussed with the patient the fact that recently it has been documented that patients on Rituxan therapy have very low formation of antibodies after being vaccinated for COVID infection. In fact I will be doing testing for this today. For this reason he will not receive any further Rituxan. The good news though is that he is asymptomatic in regard to his lymphoma with no fevers, chills, night sweats, pruritus, adenopathies, fatigue, weight loss or any other alteration. Appetite and weight are excellent. Bowel function and urination are normal. He had a Cologuard test that was positive. He pursued a colonoscopy by [facility], this will be reviewed below. He has no issues pertinent to his colon function at this time. Otherwise, no new health problems.            Past Medical History:   Diagnosis Date   • Abnormal ECG    • BPH (benign prostatic hyperplasia)    • Chronic kidney disease    • Disease of thyroid gland    • Hyperlipidemia    • Hyperparathyroidism (HCC) 2017    parathyroidectomy   • Hypertension    • Nicotine dependence    • TB (pulmonary tuberculosis)      Past Surgical History:   Procedure Laterality Date   • PARATHYROID GLAND SURGERY     • TRIGGER FINGER RELEASE      Left 5th finger     Family History   Problem Relation Age of Onset   • Heart disease Mother    • Stroke Mother    • Diabetes Mother    •  "Hypertension Sister    • Diabetes Sister    • Cancer Father    • Leukemia Father      Social History     Socioeconomic History   • Marital status:    • Number of children: 2   • Years of education: High school   • Highest education level: High school graduate   Tobacco Use   • Smoking status: Current Every Day Smoker     Packs/day: 0.50     Years: 60.00     Pack years: 30.00     Types: Cigarettes   • Smokeless tobacco: Never Used   Vaping Use   • Vaping Use: Never used   Substance and Sexual Activity   • Alcohol use: Yes     Alcohol/week: 2.0 standard drinks     Types: 2 Cans of beer per week     Comment: 2-3 beers 1-2 days a week   • Drug use: No     Comment: 2 cups coffee daily   • Sexual activity: Defer     Partners: Female       ONCOLOGIC HISTORY:  (History from previous dates can be found in the separate document.)    Current Outpatient Medications on File Prior to Visit   Medication Sig Dispense Refill   • ascorbic acid (VITAMIN C) 100 MG tablet Take  by mouth.     • aspirin 81 MG tablet Take 81 mg by mouth.     • levoFLOXacin (LEVAQUIN) 250 MG tablet Take 250 mg by mouth Daily.     • Multiple Vitamins-Minerals (MULTIVITAL-M) tablet Take  by mouth Daily.     • Omega-3 Fatty Acids (FISH OIL) 1000 MG capsule capsule Take 1,000 mg by mouth Daily With Breakfast.     • VITAMIN E PO Take  by mouth.       No current facility-administered medications on file prior to visit.       ALLERGIES:     Allergies   Allergen Reactions   • Codeine Unknown - Low Severity     UNKNOWN           Cancer-related family history includes Cancer in his father.             Objective      Vitals:    10/15/21 1102   BP: 156/69   Pulse: 56   Resp: 18   Temp: 97.7 °F (36.5 °C)   TempSrc: Temporal   SpO2: 98%   Weight: 104 kg (230 lb 1.6 oz)   Height: 182.9 cm (72.01\")   PainSc: 0-No pain     Current Status 10/15/2021   ECOG score 0     Physical Exam:    I HAVE PERSONALLY REVIEWED THE HISTORY OF THE PRESENT ILLNESS, PAST MEDICAL " HISTORY, FAMILY HISTORY, SOCIAL HISTORY, ALLERGIES, MEDICATIONS STATED ABOVE IN THE  NOTE FROM TODAY.        GENERAL:  Well-developed, well-nourished  Patient  in no acute distress.   SKIN:  Warm, dry ,NO rashes,NO purpura ,NO petechiae.  HEENT:  Pupils were equal and reactive to light and accomodation, conjunctivae noninjected, no pterygium, normal extraocular movements, normal visual acuity.   NECK:  Supple with good range of motion; no thyromegaly , no other masses, no JVD or bruits, no cervical adenopathies.No carotid artery pain, no carotid abnormal pulsation , NO arterial dance.  LYMPHATICS:  No cervical, NO supraclavicular, NO axillary,NO epitrochlear , NO inguinal adenopathy.  CARDIAC   normal rate and regular rhythm, without murmur,NO rubs NO S3 NO S4 right or left .  LUNGS: normal breath sounds bilateral, no wheezing, rhonchi, crackles or rubs.  VASCULAR VENOUS: no cyanosis, collateral circulation, varicosities, edema, palpable cords, pain, erythema.  ABDOMEN:  Soft, nontender with no hepatomegaly, no splenomegaly,no masses, no ascites, no collateral circulation,no distention,no Nirav sign.  EXTREMITIES  AND SPINE:  No clubbing, cyanosis or edema, no deformities , no pain .No kyphosis, scoliosis, no other deformities, no pain in spine, no pain in ribs , no pain inpelvic bone.  NEUROLOGICAL:  Patient was awake, alert, oriented to time, person and place.                                RECENT LABS:        Results from last 7 days   Lab Units 10/15/21  1025   WBC 10*3/mm3 7.98   NEUTROS ABS 10*3/mm3 3.56   HEMOGLOBIN g/dL 13.2   HEMATOCRIT % 40.1   PLATELETS 10*3/mm3 146     Results from last 7 days   Lab Units 10/15/21  1025   SODIUM mmol/L 138   POTASSIUM mmol/L 4.3   CHLORIDE mmol/L 104   CO2 mmol/L 21.9*   BUN mg/dL 19   CREATININE mg/dL 1.48*   CALCIUM mg/dL 9.3   ALBUMIN g/dL 4.60   BILIRUBIN mg/dL 0.4   ALK PHOS U/L 94   ALT (SGPT) U/L 19   AST (SGOT) U/L 22   GLUCOSE mg/dL 115         Patient Name:  KARINA CHAHAL JR.   YOB: 1940   MRN: [id number]   Accession#: [id number]   Collected: 9/7/2021   Received: 9/7/2021     [facility] [place] [address] [place], [place]  [address]       DIAGNOSIS:   A.  CECUM, BIOPSIES:        FRAGMENTS OF TUBULAR ADENOMA.        NO EVIDENCE OF HIGH-GRADE DYSPLASIA OR MALIGNANCY.     B.  ASCENDING COLON, BIOPSIES:        FRAGMENTS OF TUBULAR ADENOMA.        NO EVIDENCE OF HIGH-GRADE DYSPLASIA OR MALIGNANCY.     C.  COLON, 45 CM, BIOPSY:        FRAGMENTS OF TUBULAR ADENOMA, WITH FOCAL HIGH GRADE DYSPLASIA.          D.  RECTUM, BIOPSY:        FRAGMENTS OF HYPERPLASTIC POLYP.       PATHOLOGIST COMMENT:   Reviewed for quality purposes, with agreement (Specimen C, LORIE).     Electronically Signed Out on 9/9/2021        ZULAY CHOI MD   Cologuard - ,  Order: [id number]  Component   Ref Range & Units 3 mo ago   NONINV COLON CA DNA+OCC BLD SCRN STL QL   Negative Positive Abnormal     Comment: POSITIVE TEST RESULT. A positive Cologuard result should be followed with a colonoscopy or visual examination of the colon. The normal value (reference range) for this assay is negative.        Assessment/Plan    1. Marginal zone lymphoma.  · Peripheral blood flow cytometry CD5/CD10/CD23 negative; positive for p53 and 13 Q deletion, negative for KAREN and trisomy 12.  · Bone marrow evaluation demonstrated involvement by low-grade B-cell lymphoma classified again as marginal zone lymphoma occupying 75% of the total marrow.  · Patient has no B symptoms, no bulky disease.    · Significant leukocytosis with WBC count for example today being 168,000.  · Patient already taking allopurinol 300 mg daily.  · Bendamustine/Rituxan initiated, 7/23/2019.  After receiving only 7 mL's of Rituxan the patient had a reaction, specifically he developed initially feelings of warmth and  nausea.  He was given additional 20mg of IV Pepcid.  Patient then began reporting changes in vision as well as diaphoresis, and near syncopal.  He was given additional 200 mg of Solu-Cortef.  The patient was monitored.  We decided not to give further Rituxan that day, but he was brought back on day #2 and was able to complete his Rituxan without difficulty.  · 8/20/2019 due for cycle 2 months bendamustine /Rituxan, delayed due to slightly elevated creatinine 1.9 patient received IV hydration.  Improvement in absolute lymphocyte count to 39.11.   · Cycle 2 given 8/26/2019.    The patient completed his bendamustine/Rituxan therapy and now he is receiving Rituxan maintenance for 2 years to be completed in September 2021  The patient was further reviewed on 11/02/2020. Overall from the point of view of his lymphoma he has no symptoms pertinent to this at all. His appetite and weight remain stable, his bowel function and urination remain normal. He has no B symptoms, he has no peripheral adenopathy or hepatosplenomegaly. His white count, hemoglobin and platelets remain normal. In his peripheral blood he still has a minimal element of lymphocytosis. We have done on many occasions peripheral blood flow cytometry failing to document any monoclonal population of lymphocytes after the patient had treatment with bendamustine and Rituxan.   On 04/30/2021 the patient was further reviewed. He is completely asymptomatic at this time. He has no peripheral adenopathy, hepatosplenomegaly, no B symptoms or anything that would suggest progressive lymphoma. On the other hand in peripheral blood there is still an element of lymphocytosis that is not changing too much since completion of his therapy with bendamustine/Rituxan and this has been evaluated in 2019 but with peripheral blood flow cytometry showing no monoclonal population of lymphocytes. I am pending to review another peripheral blood exam today.   The patient was further  reviewed on 10/15/2021. Since the previous visit he has not had any issues pertinent to his small cell lymphocytic lymphoma. Specifically no fevers, chills, night sweats, pruritus, adenopathies, pain or alteration in performance status. In fact his ECOG status is 0 for somebody of this age.     His clinical examination today is unremarkable. He has normal white count, hemoglobin and platelets and normal white count differential. We have documented in the past on several occasions peripheral blood flow cytometry with no monoclonal population or lymphocytes anymore.     Given the fact that he will proceed today with his last Rituxan infusion and given the new knowledge that this medicine BLUNTS the body’s response to COVID vaccination, I advised him to forego this last therapy today. He has received COVID, Moderna vaccine. This has been authorized by the FDA today. The booster should be given to him and I am going to go ahead and measure his COVID antibodies today and this will be discussed with him on the telephone once that this becomes available.     The 2nd issue is the issue pertinent to his Cologuard test that was positive. This proceeded with a colonoscopy. Four polyps were removed, 1 of them was a tubular adenoma with high-grade dysplasia. He is supposed to have a new colonoscopy in 1 year and I agree with that completely. I advised him to continue taking his aspirin and vitamin D and have a diet rich in broccoli, cauliflower and Brussel sprouts that maybe could help him to decrease polyp formation.     Otherwise, the patient is going through the winter months in [place] along with his wife who is still receiving chemotherapy. Otherwise, I will review him back in 6 months with a CBC and CMP.     I asked the patient to call if any issues or concerns or if he had any symptoms that would suggest lymphoma recurrence. We always can do blood work in [place] if necessary.    ·       Vic Herrera MD

## 2021-10-16 LAB — SARS-COV-2 IGG SERPL QL IA: NEGATIVE

## 2021-10-18 ENCOUNTER — TELEPHONE (OUTPATIENT)
Dept: ONCOLOGY | Facility: CLINIC | Age: 81
End: 2021-10-18

## 2021-10-18 NOTE — TELEPHONE ENCOUNTER
HUB to READ: Left message for pt to return my call re: lab results. My direct number was given [phone]

## 2021-10-18 NOTE — TELEPHONE ENCOUNTER
----- Message from Vic Herrera MD sent at 10/18/2021  9:13 AM EDT -----  Call him he does not have any covid antibodies, he needs to keep all precautions mask, social distancing, hand washing etc

## 2021-10-18 NOTE — TELEPHONE ENCOUNTER
Pt returned my call. Informed him per Dr. Herrera, that he does not show any antibodies from his COVID vaccine. He should continue masking, social distancing and good handwashing practices. He v/u